# Patient Record
Sex: FEMALE | Race: WHITE | NOT HISPANIC OR LATINO | Employment: OTHER | URBAN - METROPOLITAN AREA
[De-identification: names, ages, dates, MRNs, and addresses within clinical notes are randomized per-mention and may not be internally consistent; named-entity substitution may affect disease eponyms.]

---

## 2017-04-11 ENCOUNTER — HOSPITAL ENCOUNTER (OUTPATIENT)
Dept: RADIOLOGY | Facility: MEDICAL CENTER | Age: 47
End: 2017-04-11
Attending: SURGERY
Payer: COMMERCIAL

## 2017-04-11 DIAGNOSIS — N63.0 LUMP OR MASS IN BREAST: ICD-10-CM

## 2017-04-11 DIAGNOSIS — N64.4 MASTODYNIA: ICD-10-CM

## 2017-04-11 PROCEDURE — 76642 ULTRASOUND BREAST LIMITED: CPT | Mod: LT

## 2017-04-11 PROCEDURE — G0204 DX MAMMO INCL CAD BI: HCPCS

## 2017-07-03 ENCOUNTER — HOSPITAL ENCOUNTER (OUTPATIENT)
Dept: RADIOLOGY | Facility: MEDICAL CENTER | Age: 47
End: 2017-07-03
Attending: ORTHOPAEDIC SURGERY
Payer: COMMERCIAL

## 2017-07-03 DIAGNOSIS — M25.512 LEFT SHOULDER PAIN, UNSPECIFIED CHRONICITY: ICD-10-CM

## 2017-07-03 PROCEDURE — 73222 MRI JOINT UPR EXTREM W/DYE: CPT | Mod: LT

## 2017-07-03 PROCEDURE — 700101 HCHG RX REV CODE 250

## 2017-07-03 PROCEDURE — 700117 HCHG RX CONTRAST REV CODE 255: Performed by: ORTHOPAEDIC SURGERY

## 2017-07-03 PROCEDURE — 23350 INJECTION FOR SHOULDER X-RAY: CPT | Mod: LT

## 2017-07-03 RX ORDER — LIDOCAINE HYDROCHLORIDE 10 MG/ML
INJECTION, SOLUTION INFILTRATION; PERINEURAL
Status: DISPENSED
Start: 2017-07-03 | End: 2017-07-03

## 2017-07-03 RX ADMIN — IOHEXOL 50 ML: 300 INJECTION, SOLUTION INTRAVENOUS at 09:29

## 2017-10-04 ENCOUNTER — APPOINTMENT (OUTPATIENT)
Dept: RADIOLOGY | Facility: MEDICAL CENTER | Age: 47
End: 2017-10-04
Attending: EMERGENCY MEDICINE
Payer: COMMERCIAL

## 2017-10-09 ENCOUNTER — HOSPITAL ENCOUNTER (OUTPATIENT)
Dept: RADIOLOGY | Facility: MEDICAL CENTER | Age: 47
End: 2017-10-09
Attending: EMERGENCY MEDICINE
Payer: COMMERCIAL

## 2017-10-09 DIAGNOSIS — J32.3 CHRONIC SPHENOIDAL SINUSITIS: ICD-10-CM

## 2017-10-09 PROCEDURE — 70486 CT MAXILLOFACIAL W/O DYE: CPT

## 2017-10-12 ENCOUNTER — HOSPITAL ENCOUNTER (OUTPATIENT)
Dept: RADIOLOGY | Facility: MEDICAL CENTER | Age: 47
End: 2017-10-12
Attending: EMERGENCY MEDICINE
Payer: COMMERCIAL

## 2017-10-12 DIAGNOSIS — R92.2 INCONCLUSIVE MAMMOGRAPHY DUE TO DENSE BREASTS: ICD-10-CM

## 2017-10-12 DIAGNOSIS — R92.30 INCONCLUSIVE MAMMOGRAPHY DUE TO DENSE BREASTS: ICD-10-CM

## 2017-10-12 PROCEDURE — 76642 ULTRASOUND BREAST LIMITED: CPT | Mod: LT

## 2017-11-09 ENCOUNTER — HOSPITAL ENCOUNTER (OUTPATIENT)
Facility: MEDICAL CENTER | Age: 47
End: 2017-11-09
Attending: OTOLARYNGOLOGY | Admitting: OTOLARYNGOLOGY
Payer: COMMERCIAL

## 2018-05-18 ENCOUNTER — HOSPITAL ENCOUNTER (OUTPATIENT)
Dept: RADIOLOGY | Facility: MEDICAL CENTER | Age: 48
End: 2018-05-18
Attending: EMERGENCY MEDICINE
Payer: COMMERCIAL

## 2018-05-18 DIAGNOSIS — R92.8 ABNORMAL FINDING ON BREAST IMAGING: ICD-10-CM

## 2018-05-18 PROCEDURE — 76642 ULTRASOUND BREAST LIMITED: CPT | Mod: LT

## 2018-05-18 PROCEDURE — G0279 TOMOSYNTHESIS, MAMMO: HCPCS

## 2018-05-31 ENCOUNTER — APPOINTMENT (OUTPATIENT)
Dept: RADIOLOGY | Facility: IMAGING CENTER | Age: 48
End: 2018-05-31
Attending: NURSE PRACTITIONER
Payer: COMMERCIAL

## 2018-05-31 ENCOUNTER — OFFICE VISIT (OUTPATIENT)
Dept: URGENT CARE | Facility: CLINIC | Age: 48
End: 2018-05-31
Payer: COMMERCIAL

## 2018-05-31 VITALS
SYSTOLIC BLOOD PRESSURE: 142 MMHG | WEIGHT: 145 LBS | TEMPERATURE: 98.4 F | HEART RATE: 82 BPM | BODY MASS INDEX: 21.98 KG/M2 | OXYGEN SATURATION: 98 % | DIASTOLIC BLOOD PRESSURE: 98 MMHG | RESPIRATION RATE: 16 BRPM | HEIGHT: 68 IN

## 2018-05-31 DIAGNOSIS — J45.20 MILD INTERMITTENT REACTIVE AIRWAY DISEASE WITHOUT COMPLICATION: Primary | ICD-10-CM

## 2018-05-31 DIAGNOSIS — R05.9 COUGH: ICD-10-CM

## 2018-05-31 PROCEDURE — 99214 OFFICE O/P EST MOD 30 MIN: CPT | Mod: 25 | Performed by: NURSE PRACTITIONER

## 2018-05-31 PROCEDURE — 94640 AIRWAY INHALATION TREATMENT: CPT | Performed by: NURSE PRACTITIONER

## 2018-05-31 PROCEDURE — 71046 X-RAY EXAM CHEST 2 VIEWS: CPT | Mod: TC,FY | Performed by: NURSE PRACTITIONER

## 2018-05-31 RX ORDER — ALBUTEROL SULFATE 2.5 MG/3ML
2.5 SOLUTION RESPIRATORY (INHALATION) ONCE
Status: COMPLETED | OUTPATIENT
Start: 2018-05-31 | End: 2018-05-31

## 2018-05-31 RX ORDER — BENZONATATE 200 MG/1
200 CAPSULE ORAL 3 TIMES DAILY PRN
Qty: 30 CAP | Refills: 0 | Status: SHIPPED | OUTPATIENT
Start: 2018-05-31 | End: 2019-10-22

## 2018-05-31 RX ADMIN — ALBUTEROL SULFATE 2.5 MG: 2.5 SOLUTION RESPIRATORY (INHALATION) at 18:58

## 2018-05-31 ASSESSMENT — ENCOUNTER SYMPTOMS
SHORTNESS OF BREATH: 0
HEMOPTYSIS: 0
HEARTBURN: 0
CHILLS: 0
DIZZINESS: 0
FEVER: 0
SORE THROAT: 1
BACK PAIN: 0
SPUTUM PRODUCTION: 1
HEADACHES: 0
VOMITING: 0
BLURRED VISION: 0
CONSTIPATION: 0
NECK PAIN: 0
FOCAL WEAKNESS: 0
PALPITATIONS: 0
COUGH: 1
NAUSEA: 0
ABDOMINAL PAIN: 0
MYALGIAS: 0
WEIGHT LOSS: 0
ORTHOPNEA: 0
DIARRHEA: 0
SWEATS: 1
WHEEZING: 0
TINGLING: 0
BRUISES/BLEEDS EASILY: 0
SENSORY CHANGE: 0
EYE PAIN: 0

## 2018-05-31 ASSESSMENT — LIFESTYLE VARIABLES: SUBSTANCE_ABUSE: 0

## 2018-05-31 ASSESSMENT — COPD QUESTIONNAIRES: COPD: 0

## 2018-05-31 NOTE — PROGRESS NOTES
Subjective:      Odalys Ballard is a 47 y.o. female who presents with Cough (x4wks cough, congestion in the chest)      Denies past medical, surgical or family history that is significant to today's problem.   RX or OTC medications reviewed with patient today.   Allergies   Allergen Reactions   • Nkda [No Known Drug Allergy]              Cough   This is a new problem. The current episode started 1 to 4 weeks ago. The problem has been waxing and waning. The cough is productive of purulent sputum (feels a heaviness in her chest on the left side when she coughs. ). Associated symptoms include a sore throat and sweats. Pertinent negatives include no chest pain, chills, ear pain, fever, headaches, heartburn, hemoptysis, myalgias, nasal congestion, postnasal drip, rash, shortness of breath, weight loss or wheezing. She has tried OTC cough suppressant and a beta-agonist inhaler for the symptoms. The treatment provided mild relief. Her past medical history is significant for asthma, bronchitis and pneumonia. There is no history of bronchiectasis, COPD, emphysema or environmental allergies.       Review of Systems   Constitutional: Negative for chills, fever and weight loss.   HENT: Positive for sore throat. Negative for ear pain and postnasal drip.    Eyes: Negative for blurred vision and pain.   Respiratory: Positive for cough and sputum production. Negative for hemoptysis, shortness of breath and wheezing.    Cardiovascular: Negative for chest pain, palpitations and orthopnea.   Gastrointestinal: Negative for abdominal pain, constipation, diarrhea, heartburn, nausea and vomiting.   Genitourinary: Negative for dysuria.   Musculoskeletal: Negative for back pain, myalgias and neck pain.   Skin: Negative for rash.   Neurological: Negative for dizziness, tingling, sensory change, focal weakness and headaches.   Endo/Heme/Allergies: Negative for environmental allergies. Does not bruise/bleed easily.   Psychiatric/Behavioral:  "Negative for substance abuse.          Objective:     /98   Pulse 82   Temp 36.9 °C (98.4 °F)   Resp 16   Ht 1.715 m (5' 7.5\")   Wt 65.8 kg (145 lb)   SpO2 98%   Breastfeeding? No   BMI 22.38 kg/m²      Physical Exam   Constitutional: She is oriented to person, place, and time. Vital signs are normal. She appears well-developed and well-nourished.  Non-toxic appearance. She does not have a sickly appearance. She does not appear ill. No distress.   HENT:   Head: Normocephalic.   Right Ear: Hearing, external ear and ear canal normal. Tympanic membrane is injected. Tympanic membrane is not erythematous. No middle ear effusion.   Left Ear: Hearing, external ear and ear canal normal. Tympanic membrane is injected. Tympanic membrane is not erythematous.  No middle ear effusion.   Nose: Rhinorrhea present. No mucosal edema. Right sinus exhibits no maxillary sinus tenderness and no frontal sinus tenderness. Left sinus exhibits no maxillary sinus tenderness and no frontal sinus tenderness.   Mouth/Throat: Uvula is midline, oropharynx is clear and moist and mucous membranes are normal. No oropharyngeal exudate, posterior oropharyngeal edema, posterior oropharyngeal erythema or tonsillar abscesses.   Eyes: Pupils are equal, round, and reactive to light.   Neck: Trachea normal, normal range of motion and full passive range of motion without pain. Neck supple.   Cardiovascular: Normal rate and regular rhythm.  PMI is not displaced.    Pulmonary/Chest: Effort normal. No respiratory distress. She has decreased breath sounds. She has no wheezes. She has no rhonchi. She has no rales.   Abdominal: Soft. Normal appearance. There is no tenderness.   Musculoskeletal: Normal range of motion.   Lymphadenopathy:     She has no cervical adenopathy.        Right: No supraclavicular adenopathy present.        Left: No supraclavicular adenopathy present.   Neurological: She is alert and oriented to person, place, and time. She " has normal strength. Gait normal.   Skin: Skin is warm and dry.   Psychiatric: She has a normal mood and affect. Her speech is normal and behavior is normal.   Nursing note and vitals reviewed.       Albuterol nebulized treatment given in urgent care. Tolerated well.      CXR:     5/31/2018 2:59 PM    HISTORY/REASON FOR EXAM:  Cough  Cough, congestion, shallow breathing x 1 week    TECHNIQUE/EXAM DESCRIPTION AND NUMBER OF VIEWS:  Two views of the chest.    COMPARISON:  4/29/16.    FINDINGS:    No pulmonary infiltrates or consolidations are noted.  No pleural effusions, no pneumothorax are appreciated.  Normal cardiopericardial silhouette.     Impression         1. No active cardiopulmonary abnormalities are identified.   Reading Provider Reading Date   Jamshid Hernandez M.D. May 31, 2018       Assessment/Plan:     1. Mild intermittent reactive airway disease without complication  albuterol (PROVENTIL) 2.5mg/3ml nebulizer solution 2.5 mg    beclomethasone (QVAR) 40 MCG/ACT inhaler   2. Cough  DX-CHEST-2 VIEWS    albuterol (PROVENTIL) 2.5mg/3ml nebulizer solution 2.5 mg    beclomethasone (QVAR) 40 MCG/ACT inhaler    benzonatate (TESSALON) 200 MG capsule     Educated in proper administration of medication(s) ordered today including safety, possible SE, risks, benefits, rationale and alternatives to therapy.   Return to clinic or PCP  4-5 days if current symptoms are not resolving in a satisfactory manner or sooner if new or worsening symptoms occur.   Patient  advised differential diagnoses, signs and symptoms which would warrant further evaluation and /or emergent evaluation.     Patient was in agreement with this treatment plan and seemed to understand without barriers.   Questions were encouraged and answered to patients satisfaction.   Pt education done. Aftercare instructions given to pt/ caregiver. .   Keep well hydrated.

## 2018-09-08 ENCOUNTER — HOSPITAL ENCOUNTER (OUTPATIENT)
Dept: RADIOLOGY | Facility: MEDICAL CENTER | Age: 48
End: 2018-09-08
Attending: OTOLARYNGOLOGY
Payer: COMMERCIAL

## 2018-09-08 DIAGNOSIS — R22.1 NECK MASS: ICD-10-CM

## 2018-09-08 PROCEDURE — 70491 CT SOFT TISSUE NECK W/DYE: CPT

## 2018-09-08 PROCEDURE — 700117 HCHG RX CONTRAST REV CODE 255: Performed by: OTOLARYNGOLOGY

## 2018-09-08 RX ADMIN — IOHEXOL 80 ML: 350 INJECTION, SOLUTION INTRAVENOUS at 15:14

## 2018-10-09 ENCOUNTER — HOSPITAL ENCOUNTER (OUTPATIENT)
Dept: RADIOLOGY | Facility: MEDICAL CENTER | Age: 48
End: 2018-10-09
Attending: EMERGENCY MEDICINE
Payer: COMMERCIAL

## 2018-10-09 DIAGNOSIS — R92.30 INCONCLUSIVE MAMMOGRAPHY DUE TO DENSE BREASTS: ICD-10-CM

## 2018-10-09 DIAGNOSIS — R92.2 INCONCLUSIVE MAMMOGRAPHY DUE TO DENSE BREASTS: ICD-10-CM

## 2018-10-09 PROCEDURE — 76642 ULTRASOUND BREAST LIMITED: CPT | Mod: LT

## 2019-01-23 ENCOUNTER — HOSPITAL ENCOUNTER (OUTPATIENT)
Dept: RADIOLOGY | Facility: MEDICAL CENTER | Age: 49
End: 2019-01-23
Attending: EMERGENCY MEDICINE
Payer: COMMERCIAL

## 2019-01-23 DIAGNOSIS — R51.9 NONINTRACTABLE HEADACHE, UNSPECIFIED CHRONICITY PATTERN, UNSPECIFIED HEADACHE TYPE: ICD-10-CM

## 2019-01-23 PROCEDURE — 70260 X-RAY EXAM OF SKULL: CPT

## 2019-01-23 PROCEDURE — 72040 X-RAY EXAM NECK SPINE 2-3 VW: CPT

## 2019-02-28 ENCOUNTER — HOSPITAL ENCOUNTER (OUTPATIENT)
Dept: RADIOLOGY | Facility: MEDICAL CENTER | Age: 49
End: 2019-02-28
Attending: OTOLARYNGOLOGY
Payer: COMMERCIAL

## 2019-02-28 DIAGNOSIS — R51.9 NONINTRACTABLE EPISODIC HEADACHE, UNSPECIFIED HEADACHE TYPE: ICD-10-CM

## 2019-02-28 PROCEDURE — 70553 MRI BRAIN STEM W/O & W/DYE: CPT

## 2019-02-28 PROCEDURE — 700117 HCHG RX CONTRAST REV CODE 255: Performed by: OTOLARYNGOLOGY

## 2019-02-28 PROCEDURE — A9585 GADOBUTROL INJECTION: HCPCS | Performed by: OTOLARYNGOLOGY

## 2019-02-28 RX ORDER — GADOBUTROL 604.72 MG/ML
6 INJECTION INTRAVENOUS ONCE
Status: COMPLETED | OUTPATIENT
Start: 2019-02-28 | End: 2019-02-28

## 2019-02-28 RX ADMIN — GADOBUTROL 6 ML: 604.72 INJECTION INTRAVENOUS at 16:37

## 2019-04-23 ENCOUNTER — APPOINTMENT (OUTPATIENT)
Dept: RADIOLOGY | Facility: MEDICAL CENTER | Age: 49
End: 2019-04-23
Attending: SURGERY
Payer: COMMERCIAL

## 2019-05-09 ENCOUNTER — HOSPITAL ENCOUNTER (OUTPATIENT)
Dept: RADIOLOGY | Facility: MEDICAL CENTER | Age: 49
End: 2019-05-09
Attending: EMERGENCY MEDICINE
Payer: COMMERCIAL

## 2019-05-30 ENCOUNTER — HOSPITAL ENCOUNTER (OUTPATIENT)
Dept: RADIOLOGY | Facility: MEDICAL CENTER | Age: 49
End: 2019-05-30
Attending: SURGERY
Payer: COMMERCIAL

## 2019-05-30 ENCOUNTER — APPOINTMENT (OUTPATIENT)
Dept: RADIOLOGY | Facility: MEDICAL CENTER | Age: 49
End: 2019-05-30
Attending: EMERGENCY MEDICINE
Payer: COMMERCIAL

## 2019-05-30 DIAGNOSIS — R92.8 ABNORMAL MAMMOGRAM: ICD-10-CM

## 2019-05-30 PROCEDURE — 700117 HCHG RX CONTRAST REV CODE 255: Performed by: SURGERY

## 2019-05-30 PROCEDURE — A9585 GADOBUTROL INJECTION: HCPCS | Performed by: SURGERY

## 2019-05-30 PROCEDURE — C8908 MRI W/O FOL W/CONT, BREAST,: HCPCS

## 2019-05-30 RX ORDER — GADOBUTROL 604.72 MG/ML
7.5 INJECTION INTRAVENOUS ONCE
Status: COMPLETED | OUTPATIENT
Start: 2019-05-30 | End: 2019-05-30

## 2019-05-30 RX ADMIN — GADOBUTROL 7.5 ML: 604.72 INJECTION INTRAVENOUS at 13:06

## 2019-07-12 ENCOUNTER — OFFICE VISIT (OUTPATIENT)
Dept: URGENT CARE | Facility: CLINIC | Age: 49
End: 2019-07-12
Payer: COMMERCIAL

## 2019-07-12 ENCOUNTER — HOSPITAL ENCOUNTER (OUTPATIENT)
Dept: RADIOLOGY | Facility: MEDICAL CENTER | Age: 49
End: 2019-07-12
Attending: PHYSICIAN ASSISTANT
Payer: COMMERCIAL

## 2019-07-12 VITALS
HEIGHT: 68 IN | RESPIRATION RATE: 20 BRPM | OXYGEN SATURATION: 94 % | BODY MASS INDEX: 23.04 KG/M2 | WEIGHT: 152 LBS | HEART RATE: 88 BPM | TEMPERATURE: 99.5 F | SYSTOLIC BLOOD PRESSURE: 130 MMHG | DIASTOLIC BLOOD PRESSURE: 90 MMHG

## 2019-07-12 DIAGNOSIS — R10.9 ABDOMINAL PAIN, UNSPECIFIED ABDOMINAL LOCATION: ICD-10-CM

## 2019-07-12 DIAGNOSIS — R10.32 LLQ ABDOMINAL PAIN: ICD-10-CM

## 2019-07-12 LAB
APPEARANCE UR: CLEAR
BILIRUB UR STRIP-MCNC: NORMAL MG/DL
COLOR UR AUTO: NORMAL
GLUCOSE UR STRIP.AUTO-MCNC: NORMAL MG/DL
KETONES UR STRIP.AUTO-MCNC: NORMAL MG/DL
LEUKOCYTE ESTERASE UR QL STRIP.AUTO: NORMAL
NITRITE UR QL STRIP.AUTO: NORMAL
PH UR STRIP.AUTO: 5 [PH] (ref 5–8)
PROT UR QL STRIP: NORMAL MG/DL
RBC UR QL AUTO: NORMAL
SP GR UR STRIP.AUTO: 1
UROBILINOGEN UR STRIP-MCNC: 0.2 MG/DL

## 2019-07-12 PROCEDURE — 74177 CT ABD & PELVIS W/CONTRAST: CPT

## 2019-07-12 PROCEDURE — 700117 HCHG RX CONTRAST REV CODE 255: Performed by: PHYSICIAN ASSISTANT

## 2019-07-12 PROCEDURE — 99214 OFFICE O/P EST MOD 30 MIN: CPT | Performed by: PHYSICIAN ASSISTANT

## 2019-07-12 PROCEDURE — 81002 URINALYSIS NONAUTO W/O SCOPE: CPT | Performed by: PHYSICIAN ASSISTANT

## 2019-07-12 RX ADMIN — IOHEXOL 100 ML: 350 INJECTION, SOLUTION INTRAVENOUS at 17:32

## 2019-07-12 ASSESSMENT — ENCOUNTER SYMPTOMS
COUGH: 0
DIARRHEA: 1
ABDOMINAL PAIN: 1
SHORTNESS OF BREATH: 0
BLOOD IN STOOL: 1
FEVER: 1
PALPITATIONS: 0
CHILLS: 0
VOMITING: 0
HEADACHES: 0
CONSTIPATION: 1
ANOREXIA: 1
FLATUS: 0
MYALGIAS: 0
NAUSEA: 0

## 2019-07-12 ASSESSMENT — PATIENT HEALTH QUESTIONNAIRE - PHQ9: CLINICAL INTERPRETATION OF PHQ2 SCORE: 0

## 2019-07-12 NOTE — PROGRESS NOTES
"Subjective:      Odalys Ballard is a 48 y.o. female who presents with Abdominal Pain (patient thinks it's diverticulosis, bloaded, and pressure on the lower left side, had bad constipation,  cramping, stool has a \"fishy\" smell x2 weeks)            Abdominal Pain   This is a new problem. Episode onset: 2 weeks  The problem occurs constantly. The pain is located in the LLQ. The pain is moderate. The quality of the pain is aching, cramping and a sensation of fullness. Pain radiation: lower back  Associated symptoms include anorexia, constipation, diarrhea (fishy loose stools) and a fever (low grade- subjective ). Pertinent negatives include no dysuria, flatus, frequency, headaches, hematuria, melena, myalgias, nausea or vomiting. Associated symptoms comments: Abdominal distension. Exacerbated by: eating and drinking  The pain is relieved by nothing. Treatments tried: liquid diet  The treatment provided no relief. Her past medical history is significant for abdominal surgery (Cholecystectomy). Hysterectomy. Patient still has ovaries.     Past Medical History:   Diagnosis Date   • ASTHMA     prn inhaler   • Bronchitis 2015   • Heart burn    • Hiatus hernia syndrome    • Other specified symptom associated with female genital organs     endometriosis, fibroids   • Pneumonia 2010       Past Surgical History:   Procedure Laterality Date   • HYSTERECTOMY ROBOTIC N/A 10/17/2016    Procedure: HYSTERECTOMY ROBOTIC, BILATERAL SALPINGECTOMY;  Surgeon: Yamilet Trammell M.D.;  Location: [facility];  Service:    • CYSTOSCOPY N/A 10/17/2016    Procedure: CYSTOSCOPY;  Surgeon: Yamilet Trammell M.D.;  Location: [facility];  Service:    • PELVISCOPY  9/6/2013    Performed by Spencer Matthews M.D. at [facility]   • CHOLECYSTECTOMY  2009    laparoscopic   • LAPAROSCOPY  2000, 2008       Family History   Problem Relation Age of Onset   • Diabetes Unknown    • Heart Disease Unknown    • " "Hypertension Unknown    • Cancer Unknown        Allergies   Allergen Reactions   • Nkda [No Known Drug Allergy]        Medications, Allergies, and current problem list reviewed today in Epic      Review of Systems   Constitutional: Positive for fever (low grade- subjective ). Negative for chills and malaise/fatigue.   Respiratory: Negative for cough and shortness of breath.    Cardiovascular: Negative for chest pain, palpitations and leg swelling.   Gastrointestinal: Positive for abdominal pain (LLQ), anorexia, blood in stool (one episode of bright red blood after bowel movement- resolved.), constipation and diarrhea (fishy loose stools). Negative for flatus, melena, nausea and vomiting.   Genitourinary: Negative for dysuria, frequency and hematuria.   Musculoskeletal: Negative for myalgias.   Neurological: Negative for headaches.     All other systems reviewed and are negative.        Objective:     /90 (BP Location: Right arm, Patient Position: Sitting, BP Cuff Size: Adult)   Pulse 88   Temp 37.5 °C (99.5 °F) (Temporal)   Resp 20   Ht 1.715 m (5' 7.5\")   Wt 68.9 kg (152 lb)   SpO2 94%   BMI 23.46 kg/m²      Physical Exam   Constitutional: She is oriented to person, place, and time. She appears well-developed and well-nourished. No distress.   HENT:   Head: Normocephalic and atraumatic.   Eyes: Conjunctivae are normal.   Cardiovascular: Normal rate, regular rhythm and normal heart sounds.  Exam reveals no gallop and no friction rub.    No murmur heard.  Pulmonary/Chest: Effort normal and breath sounds normal. No respiratory distress. She has no wheezes. She has no rales.   Abdominal: Normal appearance and bowel sounds are normal. She exhibits distension (mild abdominal distenstion). There is no hepatosplenomegaly. There is tenderness in the left lower quadrant. There is no rigidity, no rebound, no guarding, no CVA tenderness, no tenderness at McBurney's point and negative Candelaria's sign. No hernia. "   Neurological: She is alert and oriented to person, place, and time. No cranial nerve deficit.   Skin: Skin is warm and dry. No rash noted.   Psychiatric: She has a normal mood and affect. Her behavior is normal. Judgment and thought content normal.           Lab Results   Component Value Date/Time    POCCOLOR LIGHT YELLOW 07/12/2019 09:31 AM    POCAPPEAR CLEAR 07/12/2019 09:31 AM    POCLEUKEST NEG 07/12/2019 09:31 AM    POCNITRITE NEG 07/12/2019 09:31 AM    POCUROBILIGE 0.2 07/12/2019 09:31 AM    POCPROTEIN NEG 07/12/2019 09:31 AM    POCURPH 5.0 07/12/2019 09:31 AM    POCBLOOD TRACE-INTACT 07/12/2019 09:31 AM    POCSPGRV 1.005 07/12/2019 09:31 AM    POCKETONES NEG 07/12/2019 09:31 AM    POCBILIRUBIN NEG 07/12/2019 09:31 AM    POCGLUCUA NEG 07/12/2019 09:31 AM      7/12/2019 4:56 PM    HISTORY/REASON FOR EXAM:  LLQ abdominal pain, diverticulitis suspected  Left lower quadrant abdominal pain.    TECHNIQUE/EXAM DESCRIPTION:   CT scan of the abdomen and pelvis with contrast.    Contrast-enhanced helical scanning was obtained from the diaphragmatic domes through the pubic symphysis following the bolus administration of nonionic contrast without complication.    100 mL of Omnipaque 350 nonionic contrast was administered without complication.    Low dose optimization technique was utilized for this CT exam including automated exposure control and adjustment of the mA and/or kV according to patient size.    COMPARISON: 9/23/2016.    FINDINGS:    Lung bases:    No pulmonary nodules at the lung bases. No pleural or pericardial fluid.    Abdomen:    The liver is unremarkable.    The spleen is unremarkable.    The pancreas is unremarkable.    The gallbladder is surgically absent.    The adrenal glands are normal in size.    The kidneys enhance symmetrically.    The abdominal aorta is normal in caliber.    There is no lymphadenopathy.    No bowel wall thickening or bowel dilatation.    Pelvis:    The uterus is surgically  absent.    No lymph node enlargement, free fluid, or free air in the abdomen or pelvis.    No aggressive bone lesions are seen.    Mild/moderate degenerative change of the lumbar spine.  ___________________________________   Impression         1. No acute abnormality identified in the abdomen or pelvis.   Reading Provider Reading Date   Jamshid Hernandez M.D. Jul 12, 2019            Assessment/Plan:     1. LLQ abdominal pain  POCT Urinalysis    CT-ABDOMEN-PELVIS WITH    CULTURE STOOL    CRYPTO/GIARDIA RAPID ASSAY    C Diff by PCR rflx Toxin    CANCELED: C Diff by PCR rflx Toxin   2. Abdominal pain, unspecified abdominal location  CT-ABDOMEN-PELVIS WITH    C Diff by PCR rflx Toxin       CT of abdomen and pelvis negative for acute abnormalities.  Discussed with patient  Via telephone.   Unclear etiology for abdominal pain.  Will check Stool studies and change treatment plan accordingly.  Patient has follow-up with GI in several weeks.     Differential diagnoses, Supportive care, and indications for immediate follow-up discussed with patient.   Instructed to return to clinic or nearest emergency department for any change in condition, further concerns, or worsening of symptoms.    The patient demonstrated a good understanding and agreed with the treatment plan.    Cari Mcbride P.A.-C.

## 2019-07-17 ENCOUNTER — HOSPITAL ENCOUNTER (OUTPATIENT)
Facility: MEDICAL CENTER | Age: 49
End: 2019-07-17
Attending: PHYSICIAN ASSISTANT
Payer: COMMERCIAL

## 2019-07-17 DIAGNOSIS — R10.32 LLQ ABDOMINAL PAIN: ICD-10-CM

## 2019-07-17 DIAGNOSIS — R10.9 ABDOMINAL PAIN, UNSPECIFIED ABDOMINAL LOCATION: ICD-10-CM

## 2019-07-17 LAB
C DIFF DNA SPEC QL NAA+PROBE: NEGATIVE
C DIFF TOX GENS STL QL NAA+PROBE: NEGATIVE
G LAMBLIA+C PARVUM AG STL QL RAPID: NORMAL
SIGNIFICANT IND 70042: NORMAL
SITE SITE: NORMAL
SOURCE SOURCE: NORMAL

## 2019-07-17 PROCEDURE — 87493 C DIFF AMPLIFIED PROBE: CPT

## 2019-07-17 PROCEDURE — 87045 FECES CULTURE AEROBIC BACT: CPT

## 2019-07-17 PROCEDURE — 87046 STOOL CULTR AEROBIC BACT EA: CPT

## 2019-07-17 PROCEDURE — 87329 GIARDIA AG IA: CPT

## 2019-07-17 PROCEDURE — 87328 CRYPTOSPORIDIUM AG IA: CPT

## 2019-07-17 PROCEDURE — 87899 AGENT NOS ASSAY W/OPTIC: CPT

## 2019-07-18 LAB
E COLI SXT1+2 STL IA: NORMAL
SIGNIFICANT IND 70042: NORMAL
SITE SITE: NORMAL
SOURCE SOURCE: NORMAL

## 2019-07-20 LAB
BACTERIA STL CULT: NORMAL
E COLI SXT1+2 STL IA: NORMAL
SIGNIFICANT IND 70042: NORMAL
SITE SITE: NORMAL
SOURCE SOURCE: NORMAL

## 2019-09-06 ENCOUNTER — HOSPITAL ENCOUNTER (OUTPATIENT)
Dept: RADIOLOGY | Facility: MEDICAL CENTER | Age: 49
End: 2019-09-06
Attending: EMERGENCY MEDICINE
Payer: COMMERCIAL

## 2019-09-06 DIAGNOSIS — D37.032 NEOPLASM OF UNCERTAIN BEHAVIOR OF SUBMANDIBULAR GLAND: ICD-10-CM

## 2019-09-06 PROCEDURE — 76536 US EXAM OF HEAD AND NECK: CPT

## 2019-10-22 ENCOUNTER — OFFICE VISIT (OUTPATIENT)
Dept: URGENT CARE | Facility: CLINIC | Age: 49
End: 2019-10-22
Payer: COMMERCIAL

## 2019-10-22 VITALS
WEIGHT: 150 LBS | OXYGEN SATURATION: 95 % | HEART RATE: 84 BPM | BODY MASS INDEX: 23.15 KG/M2 | SYSTOLIC BLOOD PRESSURE: 160 MMHG | TEMPERATURE: 98.2 F | DIASTOLIC BLOOD PRESSURE: 92 MMHG | RESPIRATION RATE: 20 BRPM

## 2019-10-22 DIAGNOSIS — J22 LOWER RESPIRATORY INFECTION: ICD-10-CM

## 2019-10-22 PROCEDURE — 99214 OFFICE O/P EST MOD 30 MIN: CPT | Performed by: NURSE PRACTITIONER

## 2019-10-22 RX ORDER — AZITHROMYCIN 250 MG/1
TABLET, FILM COATED ORAL
Qty: 6 TAB | Refills: 0 | Status: SHIPPED | OUTPATIENT
Start: 2019-10-22 | End: 2020-06-15

## 2019-10-22 ASSESSMENT — ENCOUNTER SYMPTOMS
ABDOMINAL PAIN: 0
DOUBLE VISION: 0
WHEEZING: 1
COUGH: 1
SPUTUM PRODUCTION: 1
DIZZINESS: 0
FEVER: 0
CONSTIPATION: 0
BLURRED VISION: 0
SORE THROAT: 0
PALPITATIONS: 0
MUSCULOSKELETAL NEGATIVE: 1
STRIDOR: 0
NAUSEA: 0
DIARRHEA: 0
VOMITING: 0
HEADACHES: 0
CHILLS: 0

## 2019-10-22 NOTE — PROGRESS NOTES
Subjective:   Odalys Ballard is a 49 y.o. female who presents for Cough (Almost a week dry cough , chest congestion)        Cough   This is a new problem. The current episode started 1 to 4 weeks ago. The problem has been waxing and waning. The problem occurs every few minutes. The cough is productive of sputum. Associated symptoms include nasal congestion, postnasal drip and wheezing. Pertinent negatives include no chest pain, chills, ear congestion, ear pain, fever, headaches, rash or sore throat. The symptoms are aggravated by lying down. She has tried OTC cough suppressant for the symptoms. The treatment provided mild relief. Her past medical history is significant for asthma.        Review of Systems   Constitutional: Negative for chills and fever.   HENT: Positive for congestion and postnasal drip. Negative for ear discharge, ear pain and sore throat.    Eyes: Negative for blurred vision and double vision.   Respiratory: Positive for cough, sputum production and wheezing. Negative for stridor.    Cardiovascular: Negative for chest pain and palpitations.   Gastrointestinal: Negative for abdominal pain, constipation, diarrhea, nausea and vomiting.   Musculoskeletal: Negative.    Skin: Negative.  Negative for itching and rash.   Neurological: Negative for dizziness and headaches.   All other systems reviewed and are negative.    Patient's PMH, SocHx, SurgHx, FamHx, Drug allergies and medications reviewed.     Objective:   /92   Pulse 84   Temp 36.8 °C (98.2 °F) (Temporal)   Resp 20   Wt 68 kg (150 lb)   SpO2 95%   BMI 23.15 kg/m²   Physical Exam   Constitutional: She is oriented to person, place, and time. She appears well-developed and well-nourished. No distress.   HENT:   Head: Normocephalic.   Right Ear: Hearing and ear canal normal. Tympanic membrane is not erythematous. A middle ear effusion is present.   Left Ear: Hearing, tympanic membrane and ear canal normal. Tympanic membrane is not  erythematous.  No middle ear effusion.   Nose: Mucosal edema present. No rhinorrhea. Right sinus exhibits no maxillary sinus tenderness and no frontal sinus tenderness. Left sinus exhibits no maxillary sinus tenderness and no frontal sinus tenderness.   Mouth/Throat: Uvula is midline and mucous membranes are normal. Oropharyngeal exudate (PND) present. No posterior oropharyngeal erythema.   Eyes: Pupils are equal, round, and reactive to light. Conjunctivae, EOM and lids are normal.   Neck: Normal range of motion. No thyromegaly present.   Cardiovascular: Normal rate, regular rhythm and normal heart sounds.   Pulmonary/Chest: Effort normal. No accessory muscle usage or stridor. No apnea, no tachypnea and no bradypnea. No respiratory distress. She has no decreased breath sounds. She has wheezes in the left upper field.   Lymphadenopathy:        Head (right side): No submandibular and no tonsillar adenopathy present.        Head (left side): No submandibular and no tonsillar adenopathy present.   Neurological: She is alert and oriented to person, place, and time.   Skin: Skin is warm and dry. No rash noted. She is not diaphoretic.   Psychiatric: She has a normal mood and affect. Her speech is normal and behavior is normal. Judgment and thought content normal.   Vitals reviewed.        Assessment/Plan:   Assessment    1. Lower respiratory infection  - azithromycin (ZITHROMAX) 250 MG Tab; Take two tabs po day one followed by one tab po day two through five with food  Dispense: 6 Tab; Refill: 0    Patient encouraged to increase clear liquid intake    Differential diagnosis, natural history, supportive care, and indications for immediate follow-up discussed.     **Please note that all invasive procedures during this visit were performed by myself and/or the Medical Assistant under the supervision of the PA or MD in office**

## 2020-02-05 ENCOUNTER — OFFICE VISIT (OUTPATIENT)
Dept: URGENT CARE | Facility: CLINIC | Age: 50
End: 2020-02-05
Payer: COMMERCIAL

## 2020-02-05 ENCOUNTER — APPOINTMENT (OUTPATIENT)
Dept: RADIOLOGY | Facility: IMAGING CENTER | Age: 50
End: 2020-02-05
Attending: PHYSICIAN ASSISTANT
Payer: COMMERCIAL

## 2020-02-05 VITALS
HEIGHT: 68 IN | RESPIRATION RATE: 12 BRPM | OXYGEN SATURATION: 99 % | DIASTOLIC BLOOD PRESSURE: 94 MMHG | WEIGHT: 140 LBS | BODY MASS INDEX: 21.22 KG/M2 | TEMPERATURE: 98.6 F | HEART RATE: 96 BPM | SYSTOLIC BLOOD PRESSURE: 152 MMHG

## 2020-02-05 DIAGNOSIS — R05.9 COUGH: ICD-10-CM

## 2020-02-05 DIAGNOSIS — R07.89 CHRONIC CHEST WALL PAIN: ICD-10-CM

## 2020-02-05 DIAGNOSIS — G89.29 CHRONIC CHEST WALL PAIN: ICD-10-CM

## 2020-02-05 PROCEDURE — 71046 X-RAY EXAM CHEST 2 VIEWS: CPT | Mod: TC | Performed by: PHYSICIAN ASSISTANT

## 2020-02-05 PROCEDURE — 99214 OFFICE O/P EST MOD 30 MIN: CPT | Performed by: PHYSICIAN ASSISTANT

## 2020-02-05 RX ORDER — KETOROLAC TROMETHAMINE 30 MG/ML
30 INJECTION, SOLUTION INTRAMUSCULAR; INTRAVENOUS ONCE
Status: COMPLETED | OUTPATIENT
Start: 2020-02-05 | End: 2020-02-05

## 2020-02-05 RX ADMIN — KETOROLAC TROMETHAMINE 30 MG: 30 INJECTION, SOLUTION INTRAMUSCULAR; INTRAVENOUS at 14:10

## 2020-02-05 NOTE — PROGRESS NOTES
"Subjective:   Odalys Ballard is a 49 y.o. female who presents for Cough (productive, since Oct) and Congestion (\"rattily\" since respiratory infection in October )        Patient presents with concerns of upper left chest discomfort and intermittent cough since October.  She states that she was initially evaluated in urgent care and treated with a azithromycin.  This significantly improved her symptoms, but cough persisted.  Cough is often dry but sometimes she does produce white or green mucus.  She states that she sometimes feels a \"rattly\" sensation.  Pain is not worsened by activity, but is worse with direct pressure.  She states that she does workout a lot and has had problems with her left shoulder in the past.  Denies leg pain, leg swelling, history of blood clots, shortness of breath, dyspnea o leg pain or swelling.  Does not take OCP or use exogenous estrogen.  No recent surgery or history of malignancy.  Patient has not tried any medications for symptoms tied from initial antibiotic.  No other aggravating or alleviating factors.      Review of Systems   Constitutional: Negative for chills, fever and malaise/fatigue.   Respiratory: Positive for cough and sputum production. Negative for hemoptysis, shortness of breath and wheezing.    Cardiovascular: Positive for chest pain. Negative for palpitations, orthopnea, claudication, leg swelling and PND.   Gastrointestinal: Negative for abdominal pain, nausea and vomiting.   Neurological: Negative for dizziness and headaches.       PMH:  has a past medical history of ASTHMA, Bronchitis (2015), Heart burn, Hiatus hernia syndrome, Other specified symptom associated with female genital organs, and Pneumonia (2010). She also has no past medical history of Allergy.  MEDS:   Current Outpatient Medications:   •  albuterol 108 (90 BASE) MCG/ACT Aero Soln inhalation aerosol, Inhale 2 Puffs by mouth every 6 hours as needed for Shortness of Breath., Disp: , Rfl:   •  " "acetaminophen (TYLENOL) 500 MG Tab, Take 1,000 mg by mouth 2 times a day as needed., Disp: , Rfl:   •  Ascorbic Acid (VITAMIN C PO), Take 1 Tab by mouth every day., Disp: , Rfl:   •  Cyanocobalamin (VITAMIN B-12 PO), Take 2 Tabs by mouth every day., Disp: , Rfl:   •  Eszopiclone (LUNESTA) 3 MG TABS, Take 3 mg by mouth every evening., Disp: , Rfl:   •  rizatriptan (MAXALT) 10 MG tablet, Take 10 mg by mouth Once PRN. May repeat in 2 hours if needed, Disp: , Rfl:   •  azithromycin (ZITHROMAX) 250 MG Tab, Take two tabs po day one followed by one tab po day two through five with food, Disp: 6 Tab, Rfl: 0  •  VITAMIN E PO, Take 1 Tab by mouth every day., Disp: , Rfl:   •  Levocetirizine Dihydrochloride (XYZAL) 5 MG TABS, Take 1 Tab by mouth 1 time daily as needed., Disp: , Rfl:   ALLERGIES:   Allergies   Allergen Reactions   • Nkda [No Known Drug Allergy]      SURGHX:   Past Surgical History:   Procedure Laterality Date   • HYSTERECTOMY ROBOTIC N/A 10/17/2016    Procedure: HYSTERECTOMY ROBOTIC, BILATERAL SALPINGECTOMY;  Surgeon: Yamilet Trammell M.D.;  Location: [facility];  Service:    • CYSTOSCOPY N/A 10/17/2016    Procedure: CYSTOSCOPY;  Surgeon: Yamilet Trammell M.D.;  Location: [facility];  Service:    • PELVISCOPY  9/6/2013    Per[facility] by Spencer [facility] M.D. [facility]   • CHOLECYSTECTOMY  2009    laparoscopic   • LAPAROSCOPY  2000, 2008     SOCHX:  reports that she has never smoked. She has never used smokeless tobacco. She reports current alcohol use. She reports that she does not use drugs.  FH: Family history was reviewed, no pertinent findings to report   Objective:   /94 (BP Location: Left arm, Patient Position: Sitting, BP Cuff Size: Adult)   Pulse 96   Temp 37 °C (98.6 °F) (Temporal)   Resp 12   Ht 1.727 m (5' 8\")   Wt 63.5 kg (140 lb)   LMP  (Exact Date)   SpO2 99%   Breastfeeding? No   BMI 21.29 kg/m²   Physical Exam  Vitals signs " reviewed.   Constitutional:       General: She is not in acute distress.     Appearance: Normal appearance. She is well-developed. She is not toxic-appearing.   HENT:      Head: Normocephalic and atraumatic.      Right Ear: External ear normal.      Left Ear: External ear normal.      Nose: Nose normal.   Eyes:      General: Lids are normal.      Conjunctiva/sclera: Conjunctivae normal.   Neck:      Musculoskeletal: Neck supple.   Cardiovascular:      Rate and Rhythm: Normal rate and regular rhythm.      Heart sounds: Normal heart sounds, S1 normal and S2 normal. No murmur. No friction rub. No gallop.    Pulmonary:      Effort: Pulmonary effort is normal. No respiratory distress.      Breath sounds: Normal breath sounds. No decreased breath sounds, wheezing, rhonchi or rales.   Musculoskeletal:      Right lower leg: No edema.      Left lower leg: No edema.      Comments: Left latissimus dorsi and teres major/teres minor to palpation with palpable knot.  Patient states that palpation of this area reproduces her discomfort.  Shoulder range of motion within normal limits.   Skin:     General: Skin is warm and dry.      Capillary Refill: Capillary refill takes less than 2 seconds.   Neurological:      Mental Status: She is alert and oriented to person, place, and time.      Cranial Nerves: No cranial nerve deficit.      Sensory: No sensory deficit.   Psychiatric:         Speech: Speech normal.         Behavior: Behavior normal.         Thought Content: Thought content normal.         Judgment: Judgment normal.           Assessment/Plan:   1. Chronic chest wall pain  - ketorolac (TORADOL) injection 30 mg    2. Cough  - DX-CHEST-2 VIEWS; Future    Course: 30 mg of toradol administered in clinic- excellent response. Pt reports 50% improvement in symptoms just 15 minutes after administration.  She feels the pain is continuing to improve.     Well's score -2, PERC criteria satisfied. Pre test probability low and clinical  suspicion is low. However this consideration was discussed with patient.    PE suggests more of a musculoskelatal cause of symptoms. However givenreported incomplete response to abx and occasional productive cough I do recommend a CXR to evaluate for pneumonia or other pulmonary abnormality.   COMPARISON:  1 view chest 5/31/2018     FINDINGS:  LUNGS: The lungs are clear.     HEART and MEDIASTINUM: normal in size.     Pleura: There are no pleural effusion or pneumothoraces.     Osseous structures: There is mild scoliosis.        IMPRESSION:     No acute cardiopulmonary abnormality identified.    X-ray results reviewed with patient.  Lungs are clear to auscultation-no evidence of lower respiratory infection or other pulmonary abnormality on physical exam.  Based on physical exam I suspect musculoskeletal cause of symptoms.  Pain is reproducible and patient had excellent response to Toradol.  Although clinical suspicion is low, I do recommend an EKG to better evaluate for possible cardiac causes.  Patient refuses-states that she does not have time and she strongly feels that symptoms are not cardiac in origin.  Patient advised that I would like her to follow-up with her PCP on Monday for reevaluation.  In the meantime I recommend NSAIDs and heat.    Red flag signs and symptoms discussed with patient-should she develop any of these she must go to the emergency room for reevaluation.  Patient verbalized good understanding.  Differential diagnosis, natural history, supportive care, and indications for immediate follow-up discussed.

## 2020-02-09 ASSESSMENT — ENCOUNTER SYMPTOMS
ABDOMINAL PAIN: 0
FEVER: 0
COUGH: 1
ORTHOPNEA: 0
HEADACHES: 0
HEMOPTYSIS: 0
DIZZINESS: 0
PND: 0
PALPITATIONS: 0
VOMITING: 0
CLAUDICATION: 0
CHILLS: 0
WHEEZING: 0
NAUSEA: 0
SHORTNESS OF BREATH: 0
SPUTUM PRODUCTION: 1

## 2020-03-20 ENCOUNTER — APPOINTMENT (OUTPATIENT)
Dept: RADIOLOGY | Facility: MEDICAL CENTER | Age: 50
End: 2020-03-20
Attending: SURGERY
Payer: COMMERCIAL

## 2020-06-01 ENCOUNTER — HOSPITAL ENCOUNTER (OUTPATIENT)
Dept: RADIOLOGY | Facility: MEDICAL CENTER | Age: 50
End: 2020-06-01
Attending: SURGERY
Payer: COMMERCIAL

## 2020-06-01 DIAGNOSIS — Z80.3 FAMILY HISTORY OF BREAST CANCER: ICD-10-CM

## 2020-06-01 PROCEDURE — 700117 HCHG RX CONTRAST REV CODE 255: Performed by: SURGERY

## 2020-06-01 PROCEDURE — A9576 INJ PROHANCE MULTIPACK: HCPCS | Performed by: SURGERY

## 2020-06-01 PROCEDURE — C8908 MRI W/O FOL W/CONT, BREAST,: HCPCS

## 2020-06-01 RX ADMIN — GADOTERIDOL 15 ML: 279.3 INJECTION, SOLUTION INTRAVENOUS at 11:00

## 2020-06-01 NOTE — DISCHARGE INSTRUCTIONS
MRI ADULT DISCHARGE INSTRUCTIONS    You have been medicated today for your scan. Please follow the instructions below to ensure your safe recovery. If you have any questions or problems, feel free to call us at [phone] or [phone].     1.   Have someone stay with you to assist you as needed.    2.   Do not drive or operate any mechanical devices.    3.   Do not perform any activity that requires concentration. Make no major decisions over the next 24 hours.     4.   Be careful changing positions from laying down to sitting or standing, as you may become dizzy.     5.   Do not drink alcohol for 48 hours.    6.   There are no restrictions for eating your normal meals. Drink fluids.    7.   You may continue your usual medications for pain, tranquilizers, muscle relaxants or sedatives when awake.     8.   Tomorrow, you may continue your normal daily activities.     9.   Pressure dressing on 10 - 15 minutes. If swelling or bleeding occurs when removed, continue placing direct pressure on injection site for another 5 minutes, or until bleeding stops.     I have been informed of and understand the above discharge instructions. MRI ADULT DISCHARGE INSTRUCTIONS    You have been medicated today for your scan. Please follow the instructions below to ensure your safe recovery. If you have any questions or problems, feel free to call us at [phone] or [phone].     1.   Have someone stay with you to assist you as needed.    2.   Do not drive or operate any mechanical devices.    3.   Do not perform any activity that requires concentration. Make no major decisions over the next 24 hours.     4.   Be careful changing positions from laying down to sitting or standing, as you may become dizzy.     5.   Do not drink alcohol for 48 hours.    6.   There are no restrictions for eating your normal meals. Drink fluids.    7.   You may continue your usual medications for pain, tranquilizers, muscle relaxants or sedatives when awake.      8.   Tomorrow, you may continue your normal daily activities.     9.   Pressure dressing on 10 - 15 minutes. If swelling or bleeding occurs when removed, continue placing direct pressure on injection site for another 5 minutes, or until bleeding stops.     I have been informed of and understand the above discharge instructions.

## 2020-06-15 ENCOUNTER — OFFICE VISIT (OUTPATIENT)
Dept: MEDICAL GROUP | Age: 50
End: 2020-06-15
Payer: COMMERCIAL

## 2020-06-15 VITALS
BODY MASS INDEX: 23.4 KG/M2 | HEIGHT: 68 IN | HEART RATE: 116 BPM | DIASTOLIC BLOOD PRESSURE: 98 MMHG | SYSTOLIC BLOOD PRESSURE: 154 MMHG | WEIGHT: 154.4 LBS | TEMPERATURE: 99.6 F | OXYGEN SATURATION: 97 %

## 2020-06-15 DIAGNOSIS — Z00.00 HEALTHCARE MAINTENANCE: ICD-10-CM

## 2020-06-15 DIAGNOSIS — I10 ESSENTIAL HYPERTENSION: ICD-10-CM

## 2020-06-15 DIAGNOSIS — M62.830 MUSCLE SPASM OF BACK: ICD-10-CM

## 2020-06-15 DIAGNOSIS — R07.1 CHEST PAIN ON BREATHING: ICD-10-CM

## 2020-06-15 DIAGNOSIS — G43.001 MIGRAINE WITHOUT AURA AND WITH STATUS MIGRAINOSUS, NOT INTRACTABLE: ICD-10-CM

## 2020-06-15 DIAGNOSIS — Z84.81 FAMILY HISTORY OF BREAST CANCER GENE MUTATION IN FIRST DEGREE RELATIVE: ICD-10-CM

## 2020-06-15 DIAGNOSIS — F51.01 PRIMARY INSOMNIA: ICD-10-CM

## 2020-06-15 DIAGNOSIS — J45.30 MILD PERSISTENT ASTHMA WITHOUT COMPLICATION: ICD-10-CM

## 2020-06-15 DIAGNOSIS — G43.009 MIGRAINE WITHOUT AURA AND WITHOUT STATUS MIGRAINOSUS, NOT INTRACTABLE: ICD-10-CM

## 2020-06-15 DIAGNOSIS — E55.9 VITAMIN D DEFICIENCY: ICD-10-CM

## 2020-06-15 PROCEDURE — 99204 OFFICE O/P NEW MOD 45 MIN: CPT | Performed by: INTERNAL MEDICINE

## 2020-06-15 PROCEDURE — 93000 ELECTROCARDIOGRAM COMPLETE: CPT | Performed by: INTERNAL MEDICINE

## 2020-06-15 RX ORDER — BACLOFEN 10 MG/1
10 TABLET ORAL
COMMUNITY
Start: 2020-05-12 | End: 2020-06-15 | Stop reason: SDUPTHER

## 2020-06-15 RX ORDER — LISINOPRIL 10 MG/1
10 TABLET ORAL DAILY
Qty: 90 TAB | Refills: 1 | Status: SHIPPED | OUTPATIENT
Start: 2020-06-15 | End: 2020-07-30

## 2020-06-15 RX ORDER — ESZOPICLONE 3 MG/1
3 TABLET, FILM COATED ORAL EVERY EVENING
Qty: 90 TAB | Refills: 1
Start: 2020-06-15 | End: 2020-08-04 | Stop reason: SDUPTHER

## 2020-06-15 RX ORDER — ALBUTEROL SULFATE 90 UG/1
2 AEROSOL, METERED RESPIRATORY (INHALATION) EVERY 6 HOURS PRN
Qty: 8.5 G | Refills: 11 | Status: SHIPPED | OUTPATIENT
Start: 2020-06-15 | End: 2023-10-13 | Stop reason: SDUPTHER

## 2020-06-15 RX ORDER — BACLOFEN 10 MG/1
10 TABLET ORAL
Qty: 90 TAB | Refills: 1
Start: 2020-06-15 | End: 2020-08-03 | Stop reason: SDUPTHER

## 2020-06-15 ASSESSMENT — ENCOUNTER SYMPTOMS
SHORTNESS OF BREATH: 1
WHEEZING: 1
MYALGIAS: 1
BACK PAIN: 1

## 2020-06-15 ASSESSMENT — PATIENT HEALTH QUESTIONNAIRE - PHQ9: CLINICAL INTERPRETATION OF PHQ2 SCORE: 0

## 2020-06-15 NOTE — PROGRESS NOTES
"Subjective:      Odalys Ballard is a 49 y.o. female who presents with Establish Care and Chest Pain (pressure on left side; x Feb went to see urgent care )  Is a new patient here to get established transferring care from other physicians in the community, but who wishes to get established with a [facility] network to enable better communication between office and patient, review of lab work and other orders.    Chief complaint is intermittent exertionally related left-sided upper chest pain and dyspnea as well as mid upper back pain for several months.  He has no known history of heart disease but has been treated for multiple environmental allergies as well as a history of exertionally related wheezing for which she takes albuterol.  She is not seeing a pulmonologist but would like to be referred to 1 for further evaluation of this.  She has no known history of heart disease and EKG done 4 years ago was normal.  She had an episode of \"bronchitis\" 4 months ago and chest x-ray was normal at that time.  She was treated with antibiotics but still has had lingering left upper chest discomfort aggravated by respirations.  She has had no leg swelling or calf pain and does not smoke or use estrogen compounds.    Also here for follow-up of her other medical problems including the following:  Patient Active Problem List    Diagnosis Date Noted   • Mild persistent asthma without complication 06/15/2020   • Family history of breast cancer gene mutation in first degree relative 06/15/2020   • Essential hypertension 06/15/2020   • Muscle spasm of back 06/15/2020   • Migraine without aura and with status migrainosus, not intractable 06/15/2020   Primary insomnia.      Allergies   Allergen Reactions   • Nkda [No Known Drug Allergy]         Current Outpatient Medications   Medication Sig Dispense Refill   • albuterol 108 (90 Base) MCG/ACT Aero Soln inhalation aerosol Inhale 2 Puffs by mouth every 6 hours as needed for Shortness of " Breath. 8.5 g 11   •        • baclofen (LIORESAL) 10 MG Tab Take 1 Tab by mouth 1 time daily as needed. 90 Tab 1   • Eszopiclone (LUNESTA) 3 MG Tab Take 1 Tab by mouth every evening for 90 days. 90 Tab 1   • albuterol 108 (90 BASE) MCG/ACT Aero Soln inhalation aerosol Inhale 2 Puffs by mouth every 6 hours as needed for Shortness of Breath.     • rizatriptan (MAXALT) 10 MG tablet Take 10 mg by mouth Once PRN. May repeat in 2 hours if needed       No current facility-administered medications for this visit.        No visits with results within 1 Month(s) from this visit.   Latest known visit with results is:   Hospital Outpatient Visit on 07/17/2019   Component Date Value   • Significant Indicator 07/17/2019 NEG    • Source 07/17/2019 STL    • Site 07/17/2019 STOOL    • Culture Result 07/17/2019                      Value:No enteric pathogens isolated.  NOTE:  Stool cultures are screened for Shiga Toxins 1 and 2,  Salmonella, Shigella, Campylobacter, Aeromonas,  Plesiomonas, and Vibrio.     • EHEC 07/17/2019 Negative for Shiga Toxin 1 and 2.    • Significant Indicator 07/17/2019 NEG    • Source 07/17/2019 STL    • Site 07/17/2019 STOOL    • Ova And Parasites Antige* 07/17/2019                      Value:Negative for Giardia lamblia antigen.  Negative for Cryptosporidium parvum antigen.  NOTE:  The Cryptosporidium/Giardia assay is a rapid test for the  presence or absence of these specific antigens.  In special  circumstances, a physician may need to request a complete  ova and parasite procedure when the patient meets certain  criteria. For example, recent travel abroad,immunosupression,  recent immigration, persistent undiagnosed diarrhea, or  persistent unexplained eosinophilia may be conditions to  warrant a complete ova and parasite examination.  In these  special cases, or if the physician suspects another specific  gastrointestinal parasite,the Microbiology Department can  perform a complete ova and parasite  microscopic examination.  The request for a complete ova and parasite examination must  come directly from the physician (or designee) within the  seven days of the original stool specimen being received in  the Microbiology Department.  Stool specimens are discarded  after                           seven days of storage.     • C Diff by PCR 07/17/2019 Negative    • [id number] Presumptive 07/17/2019 Negative    • Significant Indicator 07/17/2019 NEG    • Source 07/17/2019 STL    • Site 07/17/2019 STOOL    • EHEC 07/17/2019 Negative for Shiga Toxin 1 and 2.       No recent blood work done.    Additionally patient has had hypertension in the past.  Review of records shows that she has had consistently high blood pressure greater than 150.  And elevated heart rate on her last 3 medical encounters with [facility] providers.  This is been over the last year.  She has a blood pressure cuff at home but does not use it.  She is not following a low-salt diet.  She drinks no more than 2 alcoholic drinks per week.    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  She is  to another CVA who works for waste management company in [place].  They have 1 child together, a daughter  8 years old.    The patient is very avid athlete does a lot of mountain biking and fitness training and weightlifting.  He has been involved in fitness competitions in the past.  She feels like she may have strained something in her upper back from all her strength workouts.    There is a strong family history for breast cancer as well as other cancers in multiple family members.  She is seeing a  for for further evaluation of this she has 1 first-degree relative with a genetic mutation for breast cancer.  She is followed by a local surgeon and gets MRIs of her breast annually and patient is considering strongly getting bilateral prophylactic  "mastectomies.                      HPI    Review of Systems   Respiratory: Positive for shortness of breath and wheezing.    Cardiovascular: Positive for chest pain.   Musculoskeletal: Positive for back pain and myalgias.          Objective:     /98 (BP Location: Left arm, Patient Position: Sitting, BP Cuff Size: Adult)   Pulse (!) 116   Temp 37.6 °C (99.6 °F) (Temporal)   Ht 1.727 m (5' 8\")   Wt 70 kg (154 lb 6.4 oz)   LMP 10/08/2016   SpO2 97%   BMI 23.48 kg/m²    BP by me confirms hypertension with a BP of 160/100 and left arm sitting.  Rechecked and confirmed.  Physical Exam  Vitals signs reviewed.   Constitutional:       General: She is not in acute distress.     Appearance: She is well-developed. She is not diaphoretic.   HENT:      Head: Normocephalic and atraumatic.      Right Ear: External ear normal.      Left Ear: External ear normal.      Nose: Nose normal.      Mouth/Throat:      Pharynx: No oropharyngeal exudate.   Eyes:      General: No scleral icterus.        Right eye: No discharge.         Left eye: No discharge.      Conjunctiva/sclera: Conjunctivae normal.      Pupils: Pupils are equal, round, and reactive to light.   Neck:      Musculoskeletal: Normal range of motion and neck supple.      Thyroid: No thyromegaly.      Vascular: No JVD.      Trachea: No tracheal deviation.   Cardiovascular:      Rate and Rhythm: Normal rate and regular rhythm.      Heart sounds: Normal heart sounds. No murmur. No friction rub. No gallop.       Comments: No murmurs are heard.  Pulmonary:      Effort: Pulmonary effort is normal. No respiratory distress.      Breath sounds: Normal breath sounds. No stridor. No wheezing or rales.      Comments: No wheezes are heard  Chest:      Chest wall: No tenderness.   Abdominal:      General: Bowel sounds are normal. There is no distension.      Palpations: Abdomen is soft. There is no mass.      Tenderness: There is no abdominal tenderness. There is no guarding or " rebound.   Musculoskeletal: Normal range of motion.         General: Tenderness present.      Comments: There is some mild trigger point tenderness in the paraspinous lumbar and thoracic's paraspinous musculature and trapezius muscles particular on the left side.   Lymphadenopathy:      Cervical: No cervical adenopathy.   Skin:     General: Skin is warm and dry.      Coloration: Skin is not pale.      Findings: No erythema or rash.   Neurological:      Mental Status: She is alert and oriented to person, place, and time.      Cranial Nerves: No cranial nerve deficit.      Motor: No abnormal muscle tone.      Coordination: Coordination normal.      Deep Tendon Reflexes: Reflexes are normal and symmetric. Reflexes normal.   Psychiatric:         Behavior: Behavior normal.         Thought Content: Thought content normal.         Judgment: Judgment normal.     EKG done today and reviewed by me shows normal sinus rhythm and was essentially normal.  No significant ST segment changes of ischemia or injury.            Assessment/Plan:          1. Essential hypertension-this is a new problem but previously has been noted in the past and previously treated for this.  She has 3 documented hypertensive readings greater than 150 and therefore needs treatment.  There is a strong family history of hypertension.  - lisinopril (PRINIVIL) 10 MG Tab; Take 1 Tab by mouth every day.  Dispense: 90 Tab; Refill: 1  We will start her on lisinopril 10 mg daily.  Recheck in 3 weeks assess response.  Meantime get echocardiogram and EKG chest x-ray and screening lab work.     - EC-ECHOCARDIOGRAM COMPLETE W/O CONT; Future    2. Chest pain on breathing-this is unclear to me but may just be simply musculoskeletal.  We should get CT scan to rule out any structural pulmonary disorder as well as occult pulmonary emboli.  Echocardiogram to rule out mitral valve prolapse.  EKG shows no significant cardiac abnormalities.     - CT-CHEST (THORAX) WITH;  Future  - EKG  - EC-ECHOCARDIOGRAM COMPLETE W/O CONT; Future  - Sed Rate; Future    3. Mild persistent asthma without complication-this sounds like exercise-induced asthma.  She will use albuterol inhaler 2 puffs prior to exercise.     - REFERRAL TO PULMONOLOGY      4. Healthcare maintenance     - TSH; Future  - Comp Metabolic Panel; Future  - Lipid Profile; Future  - CBC WITH DIFFERENTIAL; Future    5. Vitamin D deficiency-check levels.     - VITAMIN D,25 HYDROXY; Future    6. Migraine without aura and with status migrainosus, not intractable  No recent migraines.  PRN treatment.  Maxalt as needed.    7. Muscle spasm of back     Under good control baclofen as needed.  - baclofen (LIORESAL) 10 MG Tab; Take 1 Tab by mouth 1 time daily as needed.  Dispense: 90 Tab; Refill: 1    8. Migraine without aura and without status migrainosus, not intractable  Under good control with as needed analgesics.    9. Family history of breast cancer gene mutation in first degree relative  Reviewed.  Continue follow-up with her breast surgeon for consideration for bilateral prophylactic mastectomies.    10. Primary insomnia-good control.  Continue Lunesta.  PRN.       - Eszopiclone (LUNESTA) 3 MG Tab; Take 1 Tab by mouth every evening for 90 days.  Dispense: 90 Tab; Refill: 1    .  At

## 2020-06-18 ENCOUNTER — HOSPITAL ENCOUNTER (OUTPATIENT)
Dept: RADIOLOGY | Facility: MEDICAL CENTER | Age: 50
End: 2020-06-18
Attending: OBSTETRICS & GYNECOLOGY
Payer: COMMERCIAL

## 2020-06-18 DIAGNOSIS — Z80.3 FAMILY HISTORY OF MALIGNANT NEOPLASM OF BREAST: ICD-10-CM

## 2020-06-18 DIAGNOSIS — Z84.81 FAMILY HISTORY OF BREAST CANCER GENE MUTATION IN FIRST DEGREE RELATIVE: ICD-10-CM

## 2020-06-18 PROCEDURE — 76830 TRANSVAGINAL US NON-OB: CPT

## 2020-06-19 DIAGNOSIS — R07.81 PLEURITIC CHEST PAIN: ICD-10-CM

## 2020-06-20 LAB
25(OH)D3+25(OH)D2 SERPL-MCNC: 56 NG/ML (ref 30–100)
ALBUMIN SERPL-MCNC: 5.1 G/DL (ref 3.8–4.8)
ALBUMIN/GLOB SERPL: 2.4 {RATIO} (ref 1.2–2.2)
ALP SERPL-CCNC: 48 IU/L (ref 39–117)
ALT SERPL-CCNC: 19 IU/L (ref 0–32)
AST SERPL-CCNC: 22 IU/L (ref 0–40)
BASOPHILS # BLD AUTO: 0.1 X10E3/UL (ref 0–0.2)
BASOPHILS NFR BLD AUTO: 1 %
BILIRUB SERPL-MCNC: 0.5 MG/DL (ref 0–1.2)
BUN SERPL-MCNC: 25 MG/DL (ref 6–24)
BUN/CREAT SERPL: 26 (ref 9–23)
CALCIUM SERPL-MCNC: 9.9 MG/DL (ref 8.7–10.2)
CHLORIDE SERPL-SCNC: 101 MMOL/L (ref 96–106)
CHOLEST SERPL-MCNC: 221 MG/DL (ref 100–199)
CO2 SERPL-SCNC: 21 MMOL/L (ref 20–29)
CREAT SERPL-MCNC: 0.96 MG/DL (ref 0.57–1)
EOSINOPHIL # BLD AUTO: 0.2 X10E3/UL (ref 0–0.4)
EOSINOPHIL NFR BLD AUTO: 3 %
ERYTHROCYTE [DISTWIDTH] IN BLOOD BY AUTOMATED COUNT: 11.6 % (ref 11.7–15.4)
ERYTHROCYTE [SEDIMENTATION RATE] IN BLOOD BY WESTERGREN METHOD: 2 MM/HR (ref 0–32)
GLOBULIN SER CALC-MCNC: 2.1 G/DL (ref 1.5–4.5)
GLUCOSE SERPL-MCNC: 109 MG/DL (ref 65–99)
HCT VFR BLD AUTO: 44.1 % (ref 34–46.6)
HDLC SERPL-MCNC: 87 MG/DL
HGB BLD-MCNC: 14.9 G/DL (ref 11.1–15.9)
IMM GRANULOCYTES # BLD AUTO: 0 X10E3/UL (ref 0–0.1)
IMM GRANULOCYTES NFR BLD AUTO: 0 %
IMMATURE CELLS  115398: ABNORMAL
LABORATORY COMMENT REPORT: ABNORMAL
LDLC SERPL CALC-MCNC: 121 MG/DL (ref 0–99)
LYMPHOCYTES # BLD AUTO: 1.7 X10E3/UL (ref 0.7–3.1)
LYMPHOCYTES NFR BLD AUTO: 29 %
MCH RBC QN AUTO: 31.6 PG (ref 26.6–33)
MCHC RBC AUTO-ENTMCNC: 33.8 G/DL (ref 31.5–35.7)
MCV RBC AUTO: 94 FL (ref 79–97)
MONOCYTES # BLD AUTO: 0.4 X10E3/UL (ref 0.1–0.9)
MONOCYTES NFR BLD AUTO: 8 %
MORPHOLOGY BLD-IMP: ABNORMAL
NEUTROPHILS # BLD AUTO: 3.3 X10E3/UL (ref 1.4–7)
NEUTROPHILS NFR BLD AUTO: 59 %
NRBC BLD AUTO-RTO: ABNORMAL %
PLATELET # BLD AUTO: 266 X10E3/UL (ref 150–450)
POTASSIUM SERPL-SCNC: 4.6 MMOL/L (ref 3.5–5.2)
PROT SERPL-MCNC: 7.2 G/DL (ref 6–8.5)
RBC # BLD AUTO: 4.71 X10E6/UL (ref 3.77–5.28)
SODIUM SERPL-SCNC: 140 MMOL/L (ref 134–144)
TRIGL SERPL-MCNC: 65 MG/DL (ref 0–149)
TSH SERPL DL<=0.005 MIU/L-ACNC: 1.49 UIU/ML (ref 0.45–4.5)
VLDLC SERPL CALC-MCNC: 13 MG/DL (ref 5–40)
WBC # BLD AUTO: 5.7 X10E3/UL (ref 3.4–10.8)

## 2020-06-24 ENCOUNTER — HOSPITAL ENCOUNTER (OUTPATIENT)
Dept: CARDIOLOGY | Facility: MEDICAL CENTER | Age: 50
End: 2020-06-24
Attending: INTERNAL MEDICINE
Payer: COMMERCIAL

## 2020-06-24 DIAGNOSIS — I10 ESSENTIAL HYPERTENSION: ICD-10-CM

## 2020-06-24 DIAGNOSIS — R07.1 CHEST PAIN ON BREATHING: ICD-10-CM

## 2020-06-24 LAB
LV EJECT FRACT  99904: 65
LV EJECT FRACT MOD 2C 99903: 64.93
LV EJECT FRACT MOD 4C 99902: 69.63
LV EJECT FRACT MOD BP 99901: 65.72

## 2020-06-24 PROCEDURE — 93306 TTE W/DOPPLER COMPLETE: CPT

## 2020-06-24 PROCEDURE — 93306 TTE W/DOPPLER COMPLETE: CPT | Mod: 26 | Performed by: INTERNAL MEDICINE

## 2020-07-17 ENCOUNTER — OFFICE VISIT (OUTPATIENT)
Dept: MEDICAL GROUP | Age: 50
End: 2020-07-17
Payer: COMMERCIAL

## 2020-07-17 VITALS
TEMPERATURE: 99.3 F | DIASTOLIC BLOOD PRESSURE: 90 MMHG | OXYGEN SATURATION: 97 % | SYSTOLIC BLOOD PRESSURE: 140 MMHG | HEIGHT: 68 IN | BODY MASS INDEX: 23.34 KG/M2 | WEIGHT: 154 LBS | HEART RATE: 117 BPM

## 2020-07-17 DIAGNOSIS — R73.01 IFG (IMPAIRED FASTING GLUCOSE): ICD-10-CM

## 2020-07-17 DIAGNOSIS — J45.30 MILD PERSISTENT ASTHMA WITHOUT COMPLICATION: ICD-10-CM

## 2020-07-17 DIAGNOSIS — I10 ESSENTIAL HYPERTENSION: ICD-10-CM

## 2020-07-17 DIAGNOSIS — M62.830 MUSCLE SPASM OF BACK: ICD-10-CM

## 2020-07-17 DIAGNOSIS — G43.001 MIGRAINE WITHOUT AURA AND WITH STATUS MIGRAINOSUS, NOT INTRACTABLE: ICD-10-CM

## 2020-07-17 DIAGNOSIS — F51.01 PRIMARY INSOMNIA: ICD-10-CM

## 2020-07-17 PROCEDURE — 99214 OFFICE O/P EST MOD 30 MIN: CPT | Performed by: INTERNAL MEDICINE

## 2020-07-17 RX ORDER — AZELASTINE HCL 205.5 UG/1
SPRAY NASAL
COMMUNITY
Start: 2020-07-09 | End: 2020-11-02

## 2020-07-17 RX ORDER — RIZATRIPTAN BENZOATE 10 MG/1
10 TABLET ORAL
Qty: 12 TAB | Refills: 4 | Status: SHIPPED | OUTPATIENT
Start: 2020-07-17 | End: 2021-01-24

## 2020-07-17 SDOH — HEALTH STABILITY: MENTAL HEALTH: HOW OFTEN DO YOU HAVE A DRINK CONTAINING ALCOHOL?: 2-3 TIMES A WEEK

## 2020-07-17 ASSESSMENT — ENCOUNTER SYMPTOMS
PSYCHIATRIC NEGATIVE: 1
EYES NEGATIVE: 1
CONSTITUTIONAL NEGATIVE: 1
RESPIRATORY NEGATIVE: 1
NEUROLOGICAL NEGATIVE: 1
GASTROINTESTINAL NEGATIVE: 1
CARDIOVASCULAR NEGATIVE: 1
MUSCULOSKELETAL NEGATIVE: 1

## 2020-07-17 ASSESSMENT — FIBROSIS 4 INDEX: FIB4 SCORE: 0.89

## 2020-07-17 NOTE — PROGRESS NOTES
Subjective:      Odalys Ballard is a 49 y.o. female who presents with Follow-Up   The patient is here for followup of chronic medical problems listed below. The patient is compliant with medications and having no side effects from them. Denies chest pain, abdominal pain, dyspnea, myalgias, or cough.   Patient Active Problem List    Diagnosis Date Noted   • Mild persistent asthma without complication 06/15/2020   • Family history of breast cancer gene mutation in first degree relative 06/15/2020   • Essential hypertension 06/15/2020   • Muscle spasm of back 06/15/2020   • Migraine without aura and with status migrainosus, not intractable 06/15/2020     Allergies   Allergen Reactions   • Nkda [No Known Drug Allergy]      Outpatient Medications Prior to Visit   Medication Sig Dispense Refill   • Azelastine HCl 0.15 % Solution      • albuterol 108 (90 Base) MCG/ACT Aero Soln inhalation aerosol Inhale 2 Puffs by mouth every 6 hours as needed for Shortness of Breath. 8.5 g 11   • lisinopril (PRINIVIL) 10 MG Tab Take 1 Tab by mouth every day. 90 Tab 1   • baclofen (LIORESAL) 10 MG Tab Take 1 Tab by mouth 1 time daily as needed. 90 Tab 1   • Eszopiclone (LUNESTA) 3 MG Tab Take 1 Tab by mouth every evening for 90 days. 90 Tab 1   • albuterol 108 (90 BASE) MCG/ACT Aero Soln inhalation aerosol Inhale 2 Puffs by mouth every 6 hours as needed for Shortness of Breath.     • rizatriptan (MAXALT) 10 MG tablet Take 10 mg by mouth Once PRN. May repeat in 2 hours if needed       No facility-administered medications prior to visit.                HPI    Review of Systems   Constitutional: Negative.    HENT: Negative.    Eyes: Negative.    Respiratory: Negative.    Cardiovascular: Negative.    Gastrointestinal: Negative.    Genitourinary: Negative.    Musculoskeletal: Negative.    Skin: Negative.    Neurological: Negative.    Endo/Heme/Allergies: Negative.    Psychiatric/Behavioral: Negative.           Objective:     /90 (BP  "Location: Left arm, Patient Position: Sitting, BP Cuff Size: Adult)   Pulse (!) 117   Temp 37.4 °C (99.3 °F) (Temporal)   Ht 1.727 m (5' 8\")   Wt 69.9 kg (154 lb)   LMP 10/08/2016   SpO2 97%   BMI 23.42 kg/m²      Physical Exam  Vitals signs reviewed.   Constitutional:       General: She is not in acute distress.     Appearance: She is well-developed. She is not diaphoretic.   HENT:      Head: Normocephalic and atraumatic.      Right Ear: External ear normal.      Left Ear: External ear normal.      Nose: Nose normal.      Mouth/Throat:      Pharynx: No oropharyngeal exudate.   Eyes:      General: No scleral icterus.        Right eye: No discharge.         Left eye: No discharge.      Conjunctiva/sclera: Conjunctivae normal.      Pupils: Pupils are equal, round, and reactive to light.   Neck:      Musculoskeletal: Normal range of motion and neck supple.      Thyroid: No thyromegaly.      Vascular: No JVD.      Trachea: No tracheal deviation.   Cardiovascular:      Rate and Rhythm: Normal rate and regular rhythm.      Heart sounds: Normal heart sounds. No murmur. No friction rub. No gallop.    Pulmonary:      Effort: Pulmonary effort is normal. No respiratory distress.      Breath sounds: Normal breath sounds. No stridor. No wheezing or rales.   Chest:      Chest wall: No tenderness.   Abdominal:      General: Bowel sounds are normal. There is no distension.      Palpations: Abdomen is soft. There is no mass.      Tenderness: There is no abdominal tenderness. There is no guarding or rebound.   Musculoskeletal: Normal range of motion.         General: No tenderness.   Lymphadenopathy:      Cervical: No cervical adenopathy.   Skin:     General: Skin is warm and dry.      Coloration: Skin is not pale.      Findings: No erythema or rash.   Neurological:      Mental Status: She is alert and oriented to person, place, and time.      Cranial Nerves: No cranial nerve deficit.      Motor: No abnormal muscle tone.      " Coordination: Coordination normal.      Deep Tendon Reflexes: Reflexes are normal and symmetric. Reflexes normal.   Psychiatric:         Behavior: Behavior normal.         Thought Content: Thought content normal.         Judgment: Judgment normal.            Hospital Outpatient Visit on 06/24/2020   Component Date Value   • Eject.Frac. MOD BP 06/24/2020 65.72    • Eject.Frac. MOD 4C 06/24/2020 69.63    • Eject.Frac. MOD 2C 06/24/2020 64.93    • Left Ventrical Ejection * 06/24/2020 65    Orders Only on 06/19/2020   Component Date Value   • WBC 06/19/2020 5.7    • RBC 06/19/2020 4.71    • Hemoglobin 06/19/2020 14.9    • Hematocrit 06/19/2020 44.1    • MCV 06/19/2020 94    • MCH 06/19/2020 31.6    • MCHC 06/19/2020 33.8    • RDW 06/19/2020 11.6*   • Platelet Count 06/19/2020 266    • Neutrophils-Polys 06/19/2020 59    • Lymphocytes 06/19/2020 29    • Monocytes 06/19/2020 8    • Eosinophils 06/19/2020 3    • Basophils 06/19/2020 1    • Immature Cells 06/19/2020 CANCELED    • Neutrophils (Absolute) 06/19/2020 3.3    • Lymphs (Absolute) 06/19/2020 1.7    • Monos (Absolute) 06/19/2020 0.4    • Eos (Absolute) 06/19/2020 0.2    • Baso (Absolute) 06/19/2020 0.1    • Immature Granulocytes 06/19/2020 0    • Immature Granulocytes (a* 06/19/2020 0.0    • Nucleated RBC 06/19/2020 CANCELED    • Comments-Diff 06/19/2020 CANCELED    • Glucose 06/19/2020 109*   • Bun 06/19/2020 25*   • Creatinine 06/19/2020 0.96    • GFR If Non  Ameri* 06/19/2020 71    • GFR If  06/19/2020 81    • Bun-Creatinine Ratio 06/19/2020 26*   • Sodium 06/19/2020 140    • Potassium 06/19/2020 4.6    • Chloride 06/19/2020 101    • Co2 06/19/2020 21    • Calcium 06/19/2020 9.9    • Total Protein 06/19/2020 7.2    • Albumin 06/19/2020 5.1*   • Globulin 06/19/2020 2.1    • A-G Ratio 06/19/2020 2.4*   • Total Bilirubin 06/19/2020 0.5    • Alkaline Phosphatase 06/19/2020 48    • AST(SGOT) 06/19/2020 22    • ALT(SGPT) 06/19/2020 19    •  Cholesterol,Tot 06/19/2020 221*   • Triglycerides 06/19/2020 65    • HDL 06/19/2020 87    • VLDL Cholesterol Calc 06/19/2020 13    • LDL 06/19/2020 121*   • Comment: 06/19/2020 CANCELED    • TSH 06/19/2020 1.490    • 25-Hydroxy   Vitamin D 25 06/19/2020 56.0    • Sed Rate Westergren 06/19/2020 2       No results found for: HBA1C  Lab Results   Component Value Date/Time    SODIUM 140 06/19/2020 06:34 AM    SODIUM 138 10/10/2016 03:42 PM    POTASSIUM 4.6 06/19/2020 06:34 AM    POTASSIUM 3.7 10/10/2016 03:42 PM    CHLORIDE 101 06/19/2020 06:34 AM    CHLORIDE 105 10/10/2016 03:42 PM    CO2 21 06/19/2020 06:34 AM    CO2 23 10/10/2016 03:42 PM    GLUCOSE 109 (H) 06/19/2020 06:34 AM    GLUCOSE 90 10/10/2016 03:42 PM    BUN 25 (H) 06/19/2020 06:34 AM    BUN 21 10/10/2016 03:42 PM    CREATININE 0.96 06/19/2020 06:34 AM    CREATININE 1.03 10/10/2016 03:42 PM    BUNCREATRAT 26 (H) 06/19/2020 06:34 AM    ALKPHOSPHAT 48 06/19/2020 06:34 AM    ASTSGOT 22 06/19/2020 06:34 AM    ALTSGPT 19 06/19/2020 06:34 AM    TBILIRUBIN 0.5 06/19/2020 06:34 AM     No results found for: INR  Lab Results   Component Value Date/Time    CHOLSTRLTOT 221 (H) 06/19/2020 06:34 AM     (H) 06/19/2020 06:34 AM    HDL 87 06/19/2020 06:34 AM    TRIGLYCERIDE 65 06/19/2020 06:34 AM       No results found for: TESTOSTERONE  Lab Results   Component Value Date/Time    TSH 1.490 06/19/2020 06:34 AM     No results found for: FREET4  No results found for: URICACID  No components found for: VITB12  Lab Results   Component Value Date/Time    25HYDROXY 56.0 06/19/2020 06:34 AM     Schedule at 740     Assessment/Plan:        1. Essential hypertension-much improved and now borderline control.  But outside BP readings are all in the 1/21/1930 area systolic now.  We will therefore continue to monitor BP at home and continue her on 10 mg lisinopril.  She thinks she might have a little tickle in her throat but I explained this is probably something she has had before  and may just be from allergies but if it gets worse we will consider switching her to an ARB.      - Comp Metabolic Panel; Future    2. Migraine without aura and with status migrainosus, not intractable   Under good control. Continue same regimen.    - rizatriptan (MAXALT) 10 MG tablet; Take 1 Tab by mouth 1 time daily as needed for Migraine.  Dispense: 12 Tab; Refill: 4    3. Mild persistent asthma without complication  Under good control. Continue same regimen.       4. IFG (impaired fasting glucose)  Under good control. Continue same regimen.     - Comp Metabolic Panel; Future  - HEMOGLOBIN A1C; Future    5. Muscle spasm of back  Under good control. Continue same regimen.       6. Primary insomnia   Under good control. Continue same r regimen    Recheck in 2 months for BP recheck, and to the fasting glucose that was slightly elevated and A1c.

## 2020-07-22 ENCOUNTER — OFFICE VISIT (OUTPATIENT)
Dept: PULMONOLOGY | Facility: HOSPICE | Age: 50
End: 2020-07-22
Payer: COMMERCIAL

## 2020-07-22 VITALS
SYSTOLIC BLOOD PRESSURE: 144 MMHG | OXYGEN SATURATION: 96 % | HEIGHT: 67 IN | DIASTOLIC BLOOD PRESSURE: 90 MMHG | BODY MASS INDEX: 23.54 KG/M2 | RESPIRATION RATE: 16 BRPM | TEMPERATURE: 98.6 F | HEART RATE: 86 BPM | WEIGHT: 150 LBS

## 2020-07-22 DIAGNOSIS — J45.40 MODERATE PERSISTENT ASTHMA WITHOUT COMPLICATION: ICD-10-CM

## 2020-07-22 DIAGNOSIS — R07.89 CHEST WALL PAIN: ICD-10-CM

## 2020-07-22 DIAGNOSIS — J30.2 SEASONAL ALLERGIES: ICD-10-CM

## 2020-07-22 PROCEDURE — 99204 OFFICE O/P NEW MOD 45 MIN: CPT | Performed by: INTERNAL MEDICINE

## 2020-07-22 RX ORDER — FLUTICASONE PROPIONATE AND SALMETEROL XINAFOATE 45; 21 UG/1; UG/1
2 AEROSOL, METERED RESPIRATORY (INHALATION) EVERY 4 HOURS PRN
Qty: 45 G | Refills: 11 | Status: SHIPPED | OUTPATIENT
Start: 2020-07-22 | End: 2020-07-22

## 2020-07-22 RX ORDER — FLUTICASONE PROPIONATE AND SALMETEROL XINAFOATE 45; 21 UG/1; UG/1
2 AEROSOL, METERED RESPIRATORY (INHALATION) 2 TIMES DAILY
Qty: 1 G | Refills: 5 | Status: SHIPPED | OUTPATIENT
Start: 2020-07-22 | End: 2020-08-21

## 2020-07-22 SDOH — HEALTH STABILITY: MENTAL HEALTH: HOW MANY STANDARD DRINKS CONTAINING ALCOHOL DO YOU HAVE ON A TYPICAL DAY?: 1 OR 2

## 2020-07-22 SDOH — HEALTH STABILITY: MENTAL HEALTH: HOW OFTEN DO YOU HAVE 6 OR MORE DRINKS ON ONE OCCASION?: NEVER

## 2020-07-22 ASSESSMENT — ENCOUNTER SYMPTOMS
EYE DISCHARGE: 0
DEPRESSION: 0
WEIGHT LOSS: 0
COUGH: 1
NAUSEA: 0
WEAKNESS: 0
SPUTUM PRODUCTION: 0
EYE REDNESS: 0
MYALGIAS: 0
EYE PAIN: 0
HEMOPTYSIS: 0
CHILLS: 0
ORTHOPNEA: 0
VOMITING: 0
WHEEZING: 0
BACK PAIN: 1
SPEECH CHANGE: 0
DIZZINESS: 0
HEARTBURN: 0
CLAUDICATION: 0
FEVER: 0
PND: 0
ABDOMINAL PAIN: 0
TREMORS: 0
SHORTNESS OF BREATH: 1
PHOTOPHOBIA: 0
SINUS PAIN: 0
DIAPHORESIS: 0
NECK PAIN: 0
STRIDOR: 0
CONSTIPATION: 0
DOUBLE VISION: 0
BLURRED VISION: 0
FALLS: 0
PALPITATIONS: 0
DIARRHEA: 0
FOCAL WEAKNESS: 0
SORE THROAT: 0
HEADACHES: 0

## 2020-07-22 ASSESSMENT — FIBROSIS 4 INDEX: FIB4 SCORE: 0.89

## 2020-07-22 NOTE — PROGRESS NOTES
Chief Complaint   Patient presents with   • Establish Care     referral 6/15/2020 JENNIFER Moralez MD DX asthma    • Results     CXR 2/5/2020       HPI: This patient is a 49 y.o. female presenting for evaluation of asthma and chronic L sided chest wall pain.  The patient's past medical history significant for hypertension currently treated with lisinopril, seasonal allergies with associated postnasal drip and sinusitis for which she is followed by ENT and currently on sublingual allergy drops, mild intermittent asthma treated with only as needed bronchodilator.  She is a lifelong non-smoker.  No known occupational or environmental exposures.  Family history is notable for father with asthma.  The patient is status post hysterectomy for uterine fibroids and has a history of severe endometriosis.  She presents today for 2 reasons, the first being asthma that has worsened since relocating to [place].  She currently uses her rescue inhaler up to 3 times daily.  She has not been treated for acute exacerbation with prednisone.  In addition to this, the patient has chronic left-sided chest wall pain.  She reports that when it first began she attribute it to her exercise habits which include heavy lifting and regular Filer use.  Leaning forward position on the exercise bike exacerbated her pain which involves the left breast, axilla, left arm and left scapula area.  The area often feels swollen after exercise and sore although was not tender to touch.  She has some cystic breast disease on that side but otherwise no clear breast disease.  She did have a respiratory tract infection in October which seemed to exacerbate the symptoms although they were improving until February when her asthma symptoms worsened including cough, shortness of breath and wheezing with increased use of rescue inhaler.  Her left-sided chest pain and associated swelling seems to worsen when she is coughing frequently or breathing deeply.  Plain chest film  from February of this year was essentially unremarkable.  She did have some travel prior to onset of symptoms in October although has no other signs or symptoms to suggest pulmonary embolism, a VQ scan was ordered by her primary care provider and is scheduled for this Friday.  A CT chest was declined due to absence of shortness of breath.  The patient denies fevers, chills, night sweats, weight loss.  She is an avid exerciser as per above.  She is on lisinopril for blood pressure which is fairly new and cough has worsened since during this medication.    Past Medical History:   Diagnosis Date   • ASTHMA     prn inhaler   • Bronchitis 2015   • Heart burn    • Hiatus hernia syndrome    • Other specified symptom associated with female genital organs     endometriosis, fibroids   • Pneumonia 2010       Social History     Socioeconomic History   • Marital status:      Spouse name: Not on file   • Number of children: Not on file   • Years of education: Not on file   • Highest education level: Not on file   Occupational History   • Not on file   Social Needs   • Financial resource strain: Not on file   • Food insecurity     Worry: Not on file     Inability: Not on file   • Transportation needs     Medical: Not on file     Non-medical: Not on file   Tobacco Use   • Smoking status: Never Smoker   • Smokeless tobacco: Never Used   Substance and Sexual Activity   • Alcohol use: Yes     Frequency: 2-3 times a week     Comment: 3 per week   • Drug use: No   • Sexual activity: Not on file   Lifestyle   • Physical activity     Days per week: Not on file     Minutes per session: Not on file   • Stress: Not on file   Relationships   • Social connections     Talks on phone: Not on file     Gets together: Not on file     Attends [employer] service: Not on file     Active member of club or organization: Not on file     Attends meetings of clubs or organizations: Not on file     Relationship status: Not on file   • Intimate  partner violence     Fear of current or ex partner: Not on file     Emotionally abused: Not on file     Physically abused: Not on file     Forced sexual activity: Not on file   Other Topics Concern   • Not on file   Social History Narrative   • Not on file       Family History   Problem Relation Age of Onset   • Diabetes Other    • Heart Disease Other    • Hypertension Other    • Cancer Other        Current Outpatient Medications on File Prior to Visit   Medication Sig Dispense Refill   • Azelastine HCl 0.15 % Solution      • rizatriptan (MAXALT) 10 MG tablet Take 1 Tab by mouth 1 time daily as needed for Migraine. 12 Tab 4   • albuterol 108 (90 Base) MCG/ACT Aero Soln inhalation aerosol Inhale 2 Puffs by mouth every 6 hours as needed for Shortness of Breath. 8.5 g 11   • lisinopril (PRINIVIL) 10 MG Tab Take 1 Tab by mouth every day. 90 Tab 1   • baclofen (LIORESAL) 10 MG Tab Take 1 Tab by mouth 1 time daily as needed. 90 Tab 1   • Eszopiclone (LUNESTA) 3 MG Tab Take 1 Tab by mouth every evening for 90 days. 90 Tab 1     No current facility-administered medications on file prior to visit.        Allergies: Nkda [no known drug allergy]    ROS:   Review of Systems   Constitutional: Negative for chills, diaphoresis, fever, malaise/fatigue and weight loss.   HENT: Positive for congestion. Negative for ear discharge, ear pain, hearing loss, nosebleeds, sinus pain, sore throat and tinnitus.    Eyes: Negative for blurred vision, double vision, photophobia, pain, discharge and redness.   Respiratory: Positive for cough and shortness of breath. Negative for hemoptysis, sputum production, wheezing and stridor.    Cardiovascular: Positive for chest pain. Negative for palpitations, orthopnea, claudication, leg swelling and PND.   Gastrointestinal: Negative for abdominal pain, constipation, diarrhea, heartburn, nausea and vomiting.   Genitourinary: Negative for dysuria and urgency.   Musculoskeletal: Positive for back pain and  joint pain. Negative for falls, myalgias and neck pain.   Skin: Negative for itching and rash.   Neurological: Negative for dizziness, tremors, speech change, focal weakness, weakness and headaches.   Endo/Heme/Allergies: Negative for environmental allergies.   Psychiatric/Behavioral: Negative for depression.       There were no vitals taken for this visit.    Physical Exam:  Physical Exam  Constitutional:       General: She is not in acute distress.     Appearance: Normal appearance. She is well-developed and normal weight.   HENT:      Head: Normocephalic and atraumatic.      Right Ear: External ear normal.      Left Ear: External ear normal.      Nose: Nose normal. No congestion.      Mouth/Throat:      Mouth: Mucous membranes are moist.      Pharynx: Oropharynx is clear. No oropharyngeal exudate.   Eyes:      General: No scleral icterus.     Extraocular Movements: Extraocular movements intact.      Conjunctiva/sclera: Conjunctivae normal.      Pupils: Pupils are equal, round, and reactive to light.   Neck:      Musculoskeletal: Normal range of motion and neck supple.      Vascular: No JVD.      Trachea: No tracheal deviation.   Cardiovascular:      Rate and Rhythm: Normal rate and regular rhythm.      Heart sounds: Normal heart sounds. No murmur. No friction rub. No gallop.    Pulmonary:      Effort: No accessory muscle usage or respiratory distress.      Breath sounds: Normal breath sounds. No wheezing or rales.   Abdominal:      General: There is no distension.      Palpations: Abdomen is soft.      Tenderness: There is no abdominal tenderness.   Musculoskeletal: Normal range of motion.         General: No tenderness or deformity.      Right lower leg: No edema.      Left lower leg: No edema.   Lymphadenopathy:      Cervical: No cervical adenopathy.   Skin:     General: Skin is warm and dry.      Findings: No rash.      Nails: There is no clubbing.     Neurological:      Mental Status: She is alert and  oriented to person, place, and time.      Cranial Nerves: No cranial nerve deficit.      Gait: Gait normal.   Psychiatric:         Mood and Affect: Mood normal.         Behavior: Behavior normal.         PFTs as reviewed by me personally:none    Imaging as reviewed by me personally:as per hpi    Assessment:  1. Moderate persistent asthma without complication  fluticasone-salmeterol (ADVAIR HFA) 45-21 MCG/ACT inhaler    PULMONARY FUNCTION TESTS -Test requested: Complete Pulmonary Function Test   2. Seasonal allergies     3. Chest wall pain  CT-CHEST (THORAX) WITH       Plan:  1.  Patient has an increase in asthma symptoms with onset of spring suggesting significant atopic component.  She is on allergy shots and being treated for this with ENT.  I do think she would benefit from initiation of controller therapy and we will start Advair HFA low-dose and continue short acting bronchodilators as needed.  I will see her back in 8 to 10 weeks with full pulmonary function test and to reassess symptoms.  2.  I do suspect there is a significant atopic component to her respiratory symptoms and currently she is being treated for allergies with sublingual drops as well as over-the-counter, second-generation antihistamine and intranasal steroids.  See above regarding starting controller therapy for asthma.  3.  This is chronic and I have low suspicion that it is pulmonary in etiology but suspect it is more chest wall pain.  She has some elements to suggest possible venous thoracic outlet syndrome.  Blood pressure was equal in both arms today.  I would like to order a CT chest with contrast not to look for blood clots given she has VQ scan scheduled however to evaluate the vasculature in the neck left arm and chest and rule out any underlying lung pathology on that side.  Return in about 10 weeks (around 9/30/2020) for pfts, ct chest with contrast.

## 2020-07-24 ENCOUNTER — HOSPITAL ENCOUNTER (OUTPATIENT)
Dept: RADIOLOGY | Facility: MEDICAL CENTER | Age: 50
End: 2020-07-24
Attending: INTERNAL MEDICINE
Payer: COMMERCIAL

## 2020-07-24 DIAGNOSIS — R07.81 PLEURITIC CHEST PAIN: ICD-10-CM

## 2020-07-24 PROCEDURE — 71046 X-RAY EXAM CHEST 2 VIEWS: CPT

## 2020-07-24 PROCEDURE — A9540 TC99M MAA: HCPCS

## 2020-07-30 ENCOUNTER — PATIENT MESSAGE (OUTPATIENT)
Dept: MEDICAL GROUP | Age: 50
End: 2020-07-30

## 2020-07-30 RX ORDER — LOSARTAN POTASSIUM 25 MG/1
25 TABLET ORAL DAILY
Qty: 90 TAB | Refills: 1 | Status: SHIPPED | OUTPATIENT
Start: 2020-07-30 | End: 2021-01-28 | Stop reason: SDUPTHER

## 2020-08-04 ENCOUNTER — PATIENT MESSAGE (OUTPATIENT)
Dept: MEDICAL GROUP | Age: 50
End: 2020-08-04

## 2020-08-04 DIAGNOSIS — F51.01 PRIMARY INSOMNIA: ICD-10-CM

## 2020-08-04 RX ORDER — ESZOPICLONE 3 MG/1
3 TABLET, FILM COATED ORAL EVERY EVENING
Qty: 90 TAB | Refills: 1 | Status: SHIPPED | OUTPATIENT
Start: 2020-08-04 | End: 2021-01-28 | Stop reason: SDUPTHER

## 2020-08-05 ENCOUNTER — APPOINTMENT (OUTPATIENT)
Dept: RADIOLOGY | Facility: MEDICAL CENTER | Age: 50
End: 2020-08-05
Attending: INTERNAL MEDICINE
Payer: COMMERCIAL

## 2020-09-10 ENCOUNTER — HOSPITAL ENCOUNTER (OUTPATIENT)
Dept: RADIOLOGY | Facility: MEDICAL CENTER | Age: 50
End: 2020-09-10
Attending: INTERNAL MEDICINE
Payer: COMMERCIAL

## 2020-09-10 DIAGNOSIS — R07.89 CHEST WALL PAIN: ICD-10-CM

## 2020-09-10 PROCEDURE — 700117 HCHG RX CONTRAST REV CODE 255: Performed by: INTERNAL MEDICINE

## 2020-09-10 PROCEDURE — 71260 CT THORAX DX C+: CPT

## 2020-09-10 RX ADMIN — IOHEXOL 75 ML: 350 INJECTION, SOLUTION INTRAVENOUS at 14:30

## 2020-09-15 LAB
ALBUMIN SERPL-MCNC: 4.9 G/DL (ref 3.8–4.8)
ALBUMIN/GLOB SERPL: 2.6 {RATIO} (ref 1.2–2.2)
ALP SERPL-CCNC: 56 IU/L (ref 39–117)
ALT SERPL-CCNC: 41 IU/L (ref 0–32)
AST SERPL-CCNC: 24 IU/L (ref 0–40)
BILIRUB SERPL-MCNC: 0.4 MG/DL (ref 0–1.2)
BUN SERPL-MCNC: 25 MG/DL (ref 6–24)
BUN/CREAT SERPL: 26 (ref 9–23)
CALCIUM SERPL-MCNC: 9.9 MG/DL (ref 8.7–10.2)
CHLORIDE SERPL-SCNC: 106 MMOL/L (ref 96–106)
CO2 SERPL-SCNC: 24 MMOL/L (ref 20–29)
CREAT SERPL-MCNC: 0.95 MG/DL (ref 0.57–1)
GLOBULIN SER CALC-MCNC: 1.9 G/DL (ref 1.5–4.5)
GLUCOSE SERPL-MCNC: 94 MG/DL (ref 65–99)
HBA1C MFR BLD: 5.4 % (ref 4.8–5.6)
POTASSIUM SERPL-SCNC: 4.7 MMOL/L (ref 3.5–5.2)
PROT SERPL-MCNC: 6.8 G/DL (ref 6–8.5)
SODIUM SERPL-SCNC: 142 MMOL/L (ref 134–144)

## 2020-09-21 ENCOUNTER — NON-PROVIDER VISIT (OUTPATIENT)
Dept: PULMONOLOGY | Facility: HOSPICE | Age: 50
End: 2020-09-21
Attending: INTERNAL MEDICINE
Payer: COMMERCIAL

## 2020-09-21 VITALS — HEIGHT: 67 IN | WEIGHT: 156 LBS | BODY MASS INDEX: 24.48 KG/M2

## 2020-09-21 PROCEDURE — 94726 PLETHYSMOGRAPHY LUNG VOLUMES: CPT | Performed by: INTERNAL MEDICINE

## 2020-09-21 PROCEDURE — 94729 DIFFUSING CAPACITY: CPT | Performed by: INTERNAL MEDICINE

## 2020-09-21 PROCEDURE — 94060 EVALUATION OF WHEEZING: CPT | Performed by: INTERNAL MEDICINE

## 2020-09-21 ASSESSMENT — PULMONARY FUNCTION TESTS
FVC_PERCENT_PREDICTED: 93
FEV1_PERCENT_PREDICTED: 92
FEV1/FVC_PERCENT_PREDICTED: 98
FEV1_PERCENT_CHANGE: 3
FEV1/FVC_PREDICTED: 81
FEV1/FVC_PERCENT_PREDICTED: 94
FEV1/FVC_PERCENT_LLN: 67
FEV1/FVC: 76
FVC_LLN: 3.27
FEV1_PERCENT_CHANGE: -1
FEV1_LLN: 2.62
FEV1/FVC_PERCENT_PREDICTED: 95
FEV1/FVC: 79.18
FEV1_PREDICTED: 3.13
FVC_PREDICTED: 3.91
FEV1/FVC: 79
FEV1: 2.79
FEV1/FVC_PERCENT_PREDICTED: 80
FVC: 3.69
FEV1: 2.89
FEV1/FVC_PERCENT_CHANGE: 4
FEV1/FVC_PERCENT_PREDICTED: 99
FEV1/FVC_PERCENT_CHANGE: -300
FVC_PERCENT_PREDICTED: 94
FEV1/FVC: 76
FEV1_PERCENT_PREDICTED: 89
FVC: 3.65

## 2020-09-21 ASSESSMENT — FIBROSIS 4 INDEX: FIB4 SCORE: 0.66

## 2020-09-21 NOTE — PROCEDURES
Technician: ELISABETH Medina    Technician Comment:  Good patient effort & cooperation.  The results of this test meet the ATS/ERS standards for acceptability & reproducibility.  Test was performed on the [employer] Body Plethysmograph-Elite DX system.  Predicted values were GLI-2012 for spirometry, GLI-2017 for DLCO, ITS for Lung Volumes.  The DLCO was uncorrected for Hgb.  A bronchodilator of Ventolin HFA -2puffs via spacer administered.  DLCO performed during dilation period.    Interpretation:  1.  Baseline spirometry shows normal airflows.  2.  There is no significant bronchodilator response.  3.  Lung volumes are within normal limits.  4.  Diffusion capacity is elevated at 131% predicted.  Pulmonary function testing is within normal limits.  There is some evidence for obstruction with bronchodilator response in mid airflows however this can be effort dependent.  This finding an elevated DLCO could be consistent with reactive airways disease.  Suggest clinical correlation.

## 2020-09-25 ENCOUNTER — OFFICE VISIT (OUTPATIENT)
Dept: PULMONOLOGY | Facility: HOSPICE | Age: 50
End: 2020-09-25
Payer: COMMERCIAL

## 2020-09-25 VITALS
BODY MASS INDEX: 23.54 KG/M2 | HEART RATE: 93 BPM | HEIGHT: 67 IN | RESPIRATION RATE: 16 BRPM | OXYGEN SATURATION: 94 % | WEIGHT: 150 LBS | DIASTOLIC BLOOD PRESSURE: 70 MMHG | SYSTOLIC BLOOD PRESSURE: 130 MMHG

## 2020-09-25 DIAGNOSIS — J45.40 MODERATE PERSISTENT ASTHMA WITHOUT COMPLICATION: ICD-10-CM

## 2020-09-25 DIAGNOSIS — R07.89 CHEST WALL PAIN: ICD-10-CM

## 2020-09-25 DIAGNOSIS — J30.2 SEASONAL ALLERGIES: ICD-10-CM

## 2020-09-25 PROCEDURE — 99214 OFFICE O/P EST MOD 30 MIN: CPT | Performed by: INTERNAL MEDICINE

## 2020-09-25 ASSESSMENT — ENCOUNTER SYMPTOMS
DIARRHEA: 0
HEMOPTYSIS: 0
CONSTIPATION: 0
MYALGIAS: 0
PHOTOPHOBIA: 0
HEARTBURN: 0
BACK PAIN: 0
DEPRESSION: 0
SHORTNESS OF BREATH: 1
EYE DISCHARGE: 0
HEADACHES: 0
CLAUDICATION: 0
SINUS PAIN: 0
NECK PAIN: 1
WEAKNESS: 0
SORE THROAT: 0
COUGH: 1
PALPITATIONS: 0
NAUSEA: 0
ABDOMINAL PAIN: 0
VOMITING: 0
DIZZINESS: 0
DIAPHORESIS: 0
SPUTUM PRODUCTION: 1
WHEEZING: 0
SPEECH CHANGE: 0
WEIGHT LOSS: 0
BLURRED VISION: 0
PND: 0
TREMORS: 0
STRIDOR: 0
FALLS: 0
ORTHOPNEA: 0
EYE REDNESS: 0
CHILLS: 0
DOUBLE VISION: 0
FEVER: 0
EYE PAIN: 0
FOCAL WEAKNESS: 0

## 2020-09-25 ASSESSMENT — FIBROSIS 4 INDEX: FIB4 SCORE: 0.66

## 2020-09-25 NOTE — PROGRESS NOTES
Chief Complaint   Patient presents with   • Follow-Up     Moderate persistent Asthma without Complication // Last Seen 7/22/2020    • Results     CT 9/10/2020 // PFT 9/21/2020         HPI: This patient is a 49 y.o. female whom is followed in our clinic for asthma and non-specific chest wall pain last seen by me on 7/22/20.  The patient's past medical history significant for hypertension previously treated with lisinopril but was changed to losartan as of our last appointment, seasonal allergies with associated postnasal drip and sinusitis for which she is followed by ENT and currently on sublingual allergy drops, mild intermittent asthma treated with only as needed bronchodilator.  She is a lifelong non-smoker.  She was referred to me in July for asthma that had worsened since relocating to [place].  She was using a rescue inhaler up to 3 times daily after which we started Advair with good response.  She had never required treatment for acute exacerbation with prednisone.  She was also reporting left-sided chest wall pain which she had attribute it to exercise habits including heavy lifting in the past.  Leaning forward on her exercise bike exacerbates the pain which involves her left breast, axilla, left arm and left scapula.  All symptoms are also exacerbated by cough.  Symptoms seem to worsen after respiratory tract infection in October of last year.  We plan to start Advair as per above and obtain pulmonary function testing as well as CT chest with contrast to rule out thoracic outlet syndrome.  CT chest was within normal limits with no vascular abnormalities, muscular deformities or pulmonary parenchymal issues.  She had a VQ scan to rule out blood clots in July and pulmonary function testing done September 21 showed normal airflows, normal lung volumes, no bronchodilator response, elevated diffusion capacity.  The patient presents today for follow-up.  She does feel that the Advair has helped the symptoms both  cough and shortness of breath as well as some of the pain she experiences on the left side with activity.  Today she is less clear whether or not this pain is purely musculoskeletal or in part related to asthma.  She does have some mucus following exercise if she does not pretreat with albuterol despite the Advair use and occasionally taking a decongestant for this.  No new symptoms.    Past Medical History:   Diagnosis Date   • ASTHMA     prn inhaler   • Bronchitis 2015   • Heart burn    • Hiatus hernia syndrome    • Other specified symptom associated with female genital organs     endometriosis, fibroids   • Pneumonia 2010       Social History     Socioeconomic History   • Marital status:      Spouse name: Not on file   • Number of children: Not on file   • Years of education: Not on file   • Highest education level: Not on file   Occupational History   • Not on file   Social Needs   • Financial resource strain: Not on file   • Food insecurity     Worry: Not on file     Inability: Not on file   • Transportation needs     Medical: Not on file     Non-medical: Not on file   Tobacco Use   • Smoking status: Never Smoker   • Smokeless tobacco: Never Used   Substance and Sexual Activity   • Alcohol use: Yes     Frequency: 2-3 times a week     Drinks per session: 1 or 2     Binge frequency: Never     Comment: 3 per week   • Drug use: No   • Sexual activity: Not on file   Lifestyle   • Physical activity     Days per week: Not on file     Minutes per session: Not on file   • Stress: Not on file   Relationships   • Social connections     Talks on phone: Not on file     Gets together: Not on file     Attends Spiritism service: Not on file     Active member of club or organization: Not on file     Attends meetings of clubs or organizations: Not on file     Relationship status: Not on file   • Intimate partner violence     Fear of current or ex partner: Not on file     Emotionally abused: Not on file     Physically  abused: Not on file     Forced sexual activity: Not on file   Other Topics Concern   • Not on file   Social History Narrative   • Not on file       Family History   Problem Relation Age of Onset   • Diabetes Other    • Heart Disease Other    • Hypertension Other    • Cancer Other        Current Outpatient Medications on File Prior to Visit   Medication Sig Dispense Refill   • Eszopiclone (LUNESTA) 3 MG Tab Take 1 Tab by mouth every evening for 90 days. 90 Tab 1   • baclofen (LIORESAL) 10 MG Tab Take 1 Tab by mouth 1 time daily as needed. 90 Tab 1   • losartan (COZAAR) 25 MG Tab Take 1 Tab by mouth every day. 90 Tab 1   • rizatriptan (MAXALT) 10 MG tablet Take 1 Tab by mouth 1 time daily as needed for Migraine. 12 Tab 4   • albuterol 108 (90 Base) MCG/ACT Aero Soln inhalation aerosol Inhale 2 Puffs by mouth every 6 hours as needed for Shortness of Breath. 8.5 g 11   • Azelastine HCl 0.15 % Solution        No current facility-administered medications on file prior to visit.        Lisinopril and Nkda [no known drug allergy]      ROS:   Review of Systems   Constitutional: Negative for chills, diaphoresis, fever, malaise/fatigue and weight loss.   HENT: Negative for congestion, ear discharge, ear pain, hearing loss, nosebleeds, sinus pain, sore throat and tinnitus.    Eyes: Negative for blurred vision, double vision, photophobia, pain, discharge and redness.   Respiratory: Positive for cough, sputum production and shortness of breath. Negative for hemoptysis, wheezing and stridor.    Cardiovascular: Positive for chest pain. Negative for palpitations, orthopnea, claudication, leg swelling and PND.   Gastrointestinal: Negative for abdominal pain, constipation, diarrhea, heartburn, nausea and vomiting.   Genitourinary: Negative for dysuria and urgency.   Musculoskeletal: Positive for joint pain and neck pain. Negative for back pain, falls and myalgias.   Skin: Negative for itching and rash.   Neurological: Negative for  "dizziness, tremors, speech change, focal weakness, weakness and headaches.   Endo/Heme/Allergies: Negative for environmental allergies.   Psychiatric/Behavioral: Negative for depression.       /70 (BP Location: Left arm, Patient Position: Sitting, BP Cuff Size: Adult)   Pulse 93   Resp 16   Ht 1.702 m (5' 7\")   Wt 68 kg (150 lb)   SpO2 94%   Physical Exam  Constitutional:       General: She is not in acute distress.     Appearance: Normal appearance. She is well-developed and normal weight.   HENT:      Head: Normocephalic and atraumatic.      Right Ear: External ear normal.      Left Ear: External ear normal.      Nose: Nose normal.      Mouth/Throat:      Mouth: Mucous membranes are moist.      Pharynx: Oropharynx is clear. No oropharyngeal exudate.   Eyes:      General: No scleral icterus.     Extraocular Movements: Extraocular movements intact.      Conjunctiva/sclera: Conjunctivae normal.      Pupils: Pupils are equal, round, and reactive to light.   Neck:      Musculoskeletal: Normal range of motion and neck supple.      Vascular: No JVD.      Trachea: No tracheal deviation.   Cardiovascular:      Rate and Rhythm: Normal rate and regular rhythm.      Heart sounds: Normal heart sounds. No murmur. No friction rub. No gallop.    Pulmonary:      Effort: Pulmonary effort is normal. No accessory muscle usage or respiratory distress.      Breath sounds: Normal breath sounds. No wheezing or rales.   Abdominal:      General: There is no distension.      Palpations: Abdomen is soft.      Tenderness: There is no abdominal tenderness.   Musculoskeletal: Normal range of motion.         General: No tenderness or deformity.      Right lower leg: No edema.      Left lower leg: No edema.   Lymphadenopathy:      Cervical: No cervical adenopathy.   Skin:     General: Skin is warm and dry.      Findings: No rash.      Nails: There is no clubbing.     Neurological:      Mental Status: She is alert and oriented to " person, place, and time.      Cranial Nerves: No cranial nerve deficit.      Gait: Gait normal.   Psychiatric:         Mood and Affect: Mood normal.         Behavior: Behavior normal.         PFTs as reviewed by me personally: as per HPI    Imaging as reviewed by me personally:  As per HPI    Assessment:  1. Chest wall pain  PULMONARY FUNCTION TESTS -Test requested: Cardiopulmonary Exercise Testing (CPX)   2. Seasonal allergies     3. Moderate persistent asthma without complication         Plan:  1.  This is nonspecific and not entirely clear whether it is fully musculoskeletal although CT chest is reassuring.  Certainly some chest tightness due to bronchoconstriction could cause discomfort although deep breathing related to exertion could also cause discomfort is strictly musculoskeletal.  We discussed ordering a cardiopulmonary exercise test both to rule out underlying cardiopulmonary processes but also to see if her symptoms are brought on with the reduction in FEV1 with exercise.  Otherwise, we will continue Advair, short acting bronchodilators and treatment for allergies.  2.  Patient is followed by ENT and getting allergy therapy with sublingual drops.  She does feel that her respiratory symptoms worsen somewhat after drops and is considering discussing whether a decreased dose would be beneficial.  Encouraged her to follow-up with her providers.  3.  No history of asthma attacks.  Could be brought on by her respiratory tract infection and poorly controlled allergies.  As per above I encouraged her to follow-up with ENT in the meantime we will continue Advair and short acting bronchodilators.  I okayed her to use the albuterol prior to exercise.  Cardiopulmonary exercise test as per above.    Return in about 3 months (around 12/25/2020) for cpet.

## 2020-10-01 ENCOUNTER — OFFICE VISIT (OUTPATIENT)
Dept: MEDICAL GROUP | Age: 50
End: 2020-10-01
Payer: COMMERCIAL

## 2020-10-01 VITALS
DIASTOLIC BLOOD PRESSURE: 88 MMHG | OXYGEN SATURATION: 96 % | HEIGHT: 67 IN | TEMPERATURE: 97.3 F | SYSTOLIC BLOOD PRESSURE: 146 MMHG | HEART RATE: 85 BPM | WEIGHT: 156 LBS | BODY MASS INDEX: 24.48 KG/M2

## 2020-10-01 DIAGNOSIS — E55.9 VITAMIN D DEFICIENCY: ICD-10-CM

## 2020-10-01 DIAGNOSIS — M54.12 CERVICAL RADICULOPATHY AT C6: ICD-10-CM

## 2020-10-01 DIAGNOSIS — R73.01 IFG (IMPAIRED FASTING GLUCOSE): ICD-10-CM

## 2020-10-01 DIAGNOSIS — J45.30 MILD PERSISTENT ASTHMA WITHOUT COMPLICATION: ICD-10-CM

## 2020-10-01 DIAGNOSIS — R68.89 EXCESSIVE SODIUM INTAKE: ICD-10-CM

## 2020-10-01 DIAGNOSIS — I10 WHITE COAT SYNDROME WITH DIAGNOSIS OF HYPERTENSION: ICD-10-CM

## 2020-10-01 DIAGNOSIS — Z91.89 AT HIGH RISK FOR BREAST CANCER: ICD-10-CM

## 2020-10-01 DIAGNOSIS — I10 ESSENTIAL HYPERTENSION: ICD-10-CM

## 2020-10-01 DIAGNOSIS — E78.5 DYSLIPIDEMIA: ICD-10-CM

## 2020-10-01 DIAGNOSIS — F51.01 PRIMARY INSOMNIA: ICD-10-CM

## 2020-10-01 PROBLEM — M62.830 MUSCLE SPASM OF BACK: Status: RESOLVED | Noted: 2020-06-15 | Resolved: 2020-10-01

## 2020-10-01 PROCEDURE — 99214 OFFICE O/P EST MOD 30 MIN: CPT | Performed by: INTERNAL MEDICINE

## 2020-10-01 ASSESSMENT — ENCOUNTER SYMPTOMS
MUSCULOSKELETAL NEGATIVE: 1
CONSTITUTIONAL NEGATIVE: 1
PSYCHIATRIC NEGATIVE: 1
CARDIOVASCULAR NEGATIVE: 1
GASTROINTESTINAL NEGATIVE: 1
RESPIRATORY NEGATIVE: 1
EYES NEGATIVE: 1
NEUROLOGICAL NEGATIVE: 1

## 2020-10-01 ASSESSMENT — FIBROSIS 4 INDEX: FIB4 SCORE: 0.66

## 2020-10-01 NOTE — PROGRESS NOTES
Subjective:      Odalys Ballard is a 49 y.o. female who presents with Follow-Up (labs and blood pressure)  The patient is here for followup of chronic medical problems listed below. The patient is compliant with medications and having no side effects from them. Denies chest pain, abdominal pain, dyspnea, myalgias, or cough.   Patient Active Problem List    Diagnosis Date Noted   • Essential hypertension 10/01/2020     Priority: High   • Dyslipidemia 10/01/2020     Priority: High   • Mild persistent asthma without complication 06/15/2020     Priority: Medium   • White coat syndrome with diagnosis of hypertension 06/15/2020     Priority: Medium   • Excessive sodium intake 10/01/2020   • Cervical radiculopathy at C6 10/01/2020   • At high risk for breast cancer- prophylactic bilateral mastectomy TBD November 2020 10/01/2020   • IFG (impaired fasting glucose) 10/01/2020   • Vitamin D deficiency 10/01/2020   • Primary insomnia 10/01/2020   • Family history of breast cancer gene mutation in first degree relative 06/15/2020   • Migraine without aura and with status migrainosus, not intractable 06/15/2020   And the patient is here for evaluation of left shoulder pain was seen by neurosurgery and found to have significant C6 nerve root compression on MRI.  Surgery was contemplated but not planned at this point and trial of conservative management has been proposed and she is undergoing at this time.  This will explain the persistent left-sided chest pain and shoulder pain she explains over the last year.  She will continue with physical therapy stretching heat and rest.    Allergies   Allergen Reactions   • Lisinopril Cough   • Nkda [No Known Drug Allergy]      Outpatient Medications Prior to Visit   Medication Sig Dispense Refill   • Eszopiclone (LUNESTA) 3 MG Tab Take 1 Tab by mouth every evening for 90 days. 90 Tab 1   • baclofen (LIORESAL) 10 MG Tab Take 1 Tab by mouth 1 time daily as needed. 90 Tab 1   • losartan  (COZAAR) 25 MG Tab Take 1 Tab by mouth every day. 90 Tab 1   • Azelastine HCl 0.15 % Solution      • rizatriptan (MAXALT) 10 MG tablet Take 1 Tab by mouth 1 time daily as needed for Migraine. 12 Tab 4   • albuterol 108 (90 Base) MCG/ACT Aero Soln inhalation aerosol Inhale 2 Puffs by mouth every 6 hours as needed for Shortness of Breath. 8.5 g 11     No facility-administered medications prior to visit.      Lab Results   Component Value Date/Time    HBA1C 5.4 09/14/2020 04:46 AM     Lab Results   Component Value Date/Time    SODIUM 142 09/14/2020 04:46 AM    SODIUM 138 10/10/2016 03:42 PM    POTASSIUM 4.7 09/14/2020 04:46 AM    POTASSIUM 3.7 10/10/2016 03:42 PM    CHLORIDE 106 09/14/2020 04:46 AM    CHLORIDE 105 10/10/2016 03:42 PM    CO2 24 09/14/2020 04:46 AM    CO2 23 10/10/2016 03:42 PM    GLUCOSE 94 09/14/2020 04:46 AM    GLUCOSE 90 10/10/2016 03:42 PM    BUN 25 (H) 09/14/2020 04:46 AM    BUN 21 10/10/2016 03:42 PM    CREATININE 0.95 09/14/2020 04:46 AM    CREATININE 1.03 10/10/2016 03:42 PM    BUNCREATRAT 26 (H) 09/14/2020 04:46 AM    ALKPHOSPHAT 56 09/14/2020 04:46 AM    ASTSGOT 24 09/14/2020 04:46 AM    ALTSGPT 41 (H) 09/14/2020 04:46 AM    TBILIRUBIN 0.4 09/14/2020 04:46 AM     No results found for: INR  Lab Results   Component Value Date/Time    CHOLSTRLTOT 221 (H) 06/19/2020 06:34 AM     (H) 06/19/2020 06:34 AM    HDL 87 06/19/2020 06:34 AM    TRIGLYCERIDE 65 06/19/2020 06:34 AM       No results found for: TESTOSTERONE  Lab Results   Component Value Date/Time    TSH 1.490 06/19/2020 06:34 AM     No results found for: FREET4  No results found for: URICACID  No components found for: VITB12  Lab Results   Component Value Date/Time    25HYDROXY 56.0 06/19/2020 06:34 AM     Orders Only on 09/14/2020   Component Date Value   • Glucose 09/14/2020 94    • Bun 09/14/2020 25*   • Creatinine 09/14/2020 0.95    • GFR If Non  [facility]i* 09/14/2020 72    • GFR If  09/14/2020 82    •  "Bun-Creatinine Ratio 09/14/2020 26*   • Sodium 09/14/2020 142    • Potassium 09/14/2020 4.7    • Chloride 09/14/2020 106    • Co2 09/14/2020 24    • Calcium 09/14/2020 9.9    • Total Protein 09/14/2020 6.8    • Albumin 09/14/2020 4.9*   • Globulin 09/14/2020 1.9    • A-G Ratio 09/14/2020 2.6*   • Total Bilirubin 09/14/2020 0.4    • Alkaline Phosphatase 09/14/2020 56    • AST(SGOT) 09/14/2020 24    • ALT(SGPT) 09/14/2020 41*   • Glycohemoglobin 09/14/2020 5.4                   HPI    Review of Systems   Constitutional: Negative.    HENT: Negative.    Eyes: Negative.    Respiratory: Negative.    Cardiovascular: Negative.    Gastrointestinal: Negative.    Genitourinary: Negative.    Musculoskeletal: Negative.    Skin: Negative.    Neurological: Negative.    Endo/Heme/Allergies: Negative.    Psychiatric/Behavioral: Negative.           Objective:     /88   Pulse 85   Temp 36.3 °C (97.3 °F) (Temporal)   Ht 1.702 m (5' 7\")   Wt 70.8 kg (156 lb)   LMP 10/08/2016   SpO2 96%   BMI 24.43 kg/m²      Physical Exam  Vitals signs reviewed.   Constitutional:       General: She is not in acute distress.     Appearance: She is well-developed. She is not diaphoretic.   HENT:      Head: Normocephalic and atraumatic.      Right Ear: External ear normal.      Left Ear: External ear normal.      Nose: Nose normal.      Mouth/Throat:      Pharynx: No oropharyngeal exudate.   Eyes:      General: No scleral icterus.        Right eye: No discharge.         Left eye: No discharge.      Conjunctiva/sclera: Conjunctivae normal.      Pupils: Pupils are equal, round, and reactive to light.   Neck:      Musculoskeletal: Normal range of motion and neck supple.      Thyroid: No thyromegaly.      Vascular: No JVD.      Trachea: No tracheal deviation.   Cardiovascular:      Rate and Rhythm: Normal rate and regular rhythm.      Heart sounds: Normal heart sounds. No murmur. No friction rub. No gallop.    Pulmonary:      Effort: Pulmonary " effort is normal. No respiratory distress.      Breath sounds: Normal breath sounds. No stridor. No wheezing or rales.   Chest:      Chest wall: No tenderness.   Abdominal:      General: Bowel sounds are normal. There is no distension.      Palpations: Abdomen is soft. There is no mass.      Tenderness: There is no abdominal tenderness. There is no guarding or rebound.   Musculoskeletal: Normal range of motion.         General: No tenderness.   Lymphadenopathy:      Cervical: No cervical adenopathy.   Skin:     General: Skin is warm and dry.      Coloration: Skin is not pale.      Findings: No erythema or rash.   Neurological:      Mental Status: She is alert and oriented to person, place, and time.      Cranial Nerves: No cranial nerve deficit.      Motor: No abnormal muscle tone.      Coordination: Coordination normal.      Deep Tendon Reflexes: Reflexes are normal and symmetric. Reflexes normal.   Psychiatric:         Behavior: Behavior normal.         Thought Content: Thought content normal.         Judgment: Judgment normal.                 Assessment/Plan:        1. Essential hypertension-not at goal.  Patient consumes excessive amount of salt and advised on low-sodium diet which should help bring her blood pressure down least 10-15 points.  She will use of salt salt substitute.  If no improvement will consider increasing losartan to 50 mg daily.     - TSH; Future  - Comp Metabolic Panel; Future  - Lipid Profile; Future  - CBC WITH DIFFERENTIAL; Future    2. Excessive sodium intake-as above        3. IFG (impaired fasting glucose)-much better with change in diet and exercise program.  Now normal.     - HEMOGLOBIN A1C; Future    4. Vitamin D deficiency-good control.  Continue supplements.      - VITAMIN D,25 HYDROXY; Future    5. Mild persistent asthma without complication  Under good control.  Continue inhalers as prescribed.  Follow-up with [facility].    6. Dyslipidemia-under good control with diet and exercise.   Continue same regimen.  Recheck labs.     - TSH; Future  - Comp Metabolic Panel; Future  - Lipid Profile; Future  - CBC WITH DIFFERENTIAL; Future    7. Cervical radiculopathy at C6-new problem-recently found on MRI.  Continue management as per her physical medicine doctors and neurosurgeons.       8. At high risk for breast cancer- prophylactic bilateral mastectomy TBD November 2020.  Patient's had extensive consultation with medical geneticist regarding her high risk and there is family members for breast cancer.  She is decided on prophylactic bilateral mastectomy which will be done in November of this year in conjunction with plastic surgery and her general surgeon.  Has noticed no breast lumps or breast pain recently.         9. White coat syndrome with diagnosis of hypertension  -This will be taken account 1 week follow-up on blood pressure at next visit.  She will keep a record of her outside readings which have been well controlled less than 140 consistently systolic.    10. Primary insomnia  Under good control continue Lunesta as needed.

## 2020-10-03 ENCOUNTER — OFFICE VISIT (OUTPATIENT)
Dept: URGENT CARE | Facility: CLINIC | Age: 50
End: 2020-10-03
Payer: COMMERCIAL

## 2020-10-03 ENCOUNTER — HOSPITAL ENCOUNTER (OUTPATIENT)
Facility: MEDICAL CENTER | Age: 50
End: 2020-10-03
Attending: NURSE PRACTITIONER
Payer: COMMERCIAL

## 2020-10-03 VITALS
OXYGEN SATURATION: 96 % | TEMPERATURE: 97.5 F | WEIGHT: 159 LBS | RESPIRATION RATE: 16 BRPM | BODY MASS INDEX: 24.96 KG/M2 | HEIGHT: 67 IN | HEART RATE: 99 BPM | DIASTOLIC BLOOD PRESSURE: 86 MMHG | SYSTOLIC BLOOD PRESSURE: 136 MMHG

## 2020-10-03 DIAGNOSIS — R11.0 NAUSEA: ICD-10-CM

## 2020-10-03 DIAGNOSIS — J02.9 SORE THROAT: ICD-10-CM

## 2020-10-03 DIAGNOSIS — R19.7 DIARRHEA, UNSPECIFIED TYPE: ICD-10-CM

## 2020-10-03 DIAGNOSIS — B37.0 ORAL THRUSH: ICD-10-CM

## 2020-10-03 LAB
INT CON NEG: NORMAL
INT CON POS: NORMAL
S PYO AG THROAT QL: NEGATIVE

## 2020-10-03 PROCEDURE — U0003 INFECTIOUS AGENT DETECTION BY NUCLEIC ACID (DNA OR RNA); SEVERE ACUTE RESPIRATORY SYNDROME CORONAVIRUS 2 (SARS-COV-2) (CORONAVIRUS DISEASE [COVID-19]), AMPLIFIED PROBE TECHNIQUE, MAKING USE OF HIGH THROUGHPUT TECHNOLOGIES AS DESCRIBED BY CMS-2020-01-R: HCPCS

## 2020-10-03 PROCEDURE — 99214 OFFICE O/P EST MOD 30 MIN: CPT | Performed by: NURSE PRACTITIONER

## 2020-10-03 PROCEDURE — 87880 STREP A ASSAY W/OPTIC: CPT | Performed by: NURSE PRACTITIONER

## 2020-10-03 ASSESSMENT — ENCOUNTER SYMPTOMS
SORE THROAT: 1
ABDOMINAL PAIN: 0
WEAKNESS: 0
NECK PAIN: 0
SHORTNESS OF BREATH: 0
MYALGIAS: 0
FEVER: 0
EYE REDNESS: 0
HEADACHES: 0
CONSTIPATION: 0
COUGH: 0
DIARRHEA: 1
FLANK PAIN: 0
CHILLS: 0
PALPITATIONS: 0
VOMITING: 0
NAUSEA: 1
EYE DISCHARGE: 0
WHEEZING: 0
DIZZINESS: 0
ORTHOPNEA: 0

## 2020-10-03 ASSESSMENT — FIBROSIS 4 INDEX: FIB4 SCORE: 0.66

## 2020-10-03 NOTE — PATIENT INSTRUCTIONS
INSTRUCTIONS FOR COVID-19 OR ANY OTHER INFECTIOUS RESPIRATORY ILLNESSES    The Centers for Disease Control and Prevention (CDC) states that early indications for COVID-19 include cough, shortness of breath, difficulty breathing, or at least two of the following symptoms: chills, shaking with chills, muscle pain, headache, sore throat, and loss of taste or smell. Symptoms can range from mild to severe and may appear up to two weeks after exposure to the virus.    The practice of self-isolation and quarantine helps protect the public and your family by  preventing exposure to people who have or may have a contagious disease. Please follow the prevention steps below as based on CDC guidelines:    WHEN TO STOP ISOLATION: Persons with COVID-19 or any other infectious respiratory illness who have symptoms and were advised to care for themselves at home may discontinue home isolation under the following conditions:  · At least 24 hours have passed since recovery defined as resolution of fever without the use of fever-reducing medications; AND,  · Improvement in respiratory symptoms (e.g., cough, shortness of breath); AND,  · At least 10 days have passed since symptoms first appeared and have had no subsequent illness.    MONITOR YOUR SYMPTOMS: If your illness is worsening, seek prompt medical attention. If you have a medical emergency and need to call 911, notify the dispatch personnel that you have, or are being evaluated for confirmed or suspected COVID-19 or another infectious respiratory illness. Wear a facemask if possible.    ACTIVITY RESTRICTION: restrict activities outside your home, except for getting medical care. Do not go to work, school, or public areas. Avoid using public transportation, ride-sharing, or taxis.    SCHEDULED MEDICAL APPOINTMENTS: Notify your provider that you have, or are being evaluated for, confirmed or suspected COVID-19 or another infectious respiratory. This will help the healthcare  provider’s office safely take care of you and keep other people from getting exposed or infected.    FACEMASKS, when to wear: Anytime you are away from your home or around other people or pets. If you are unable to wear one, maintain a minimum of 6 feet distancing from others.    LIVING ENVIRONMENT: Stay in a separate room from other people and pets. If possible, use a separate bathroom, have someone else care for your pets and avoid sharing household items. Any items used should be washed thoroughly with soap and water. Clean all “high-touch” surfaces every day. Use a household cleaning spray or wipe, according to the label instructions. High touch surfaces include (but are not limited to) counters, tabletops, doorknobs, bathroom fixtures, toilets, phones, keyboards, tablets, and bedside tables.     HAND WASHING: Frequently wash hands with soap and water for at least 20 seconds,  especially after blowing your nose, coughing, or sneezing; going to the bathroom; before and after interacting with pets; and before and after eating or preparing food. If hands are visibly dirty use soap and water. If soap and water are not available, use an alcohol-based hand  with at least 60% alcohol. Avoid touching your eyes, nose, and mouth with unwashed hands. Cover your coughs and sneezes with a tissue. Throw used tissues in a lined trash can. Immediately wash your hands.    ACTIVE/FACILITATED SELF-MONITORING: Follow instructions provided by your local health department or health professionals, as appropriate. When working with your local health department check their available hours.    [facility]   Phone Number   [facility] [phone]   [facility] [place] [phone]   [place] Call [address]   [place] [phone]     IF YOU HAVE CONFIRMED POSITIVE COVID-19:    Those who have completely recovered from COVID-19 may have immune-boosting antibodies in their plasma--called “convalescent plasma”--that could be  used to treat critically ill COVID19 patients.    [facility] is excited to begin working with [employer] on collecting convalescent plasma from  people who have recovered from COVID-19 as part of a program to treat patients infected with the virus. This FDA-approved “emergency investigational new drug” is a special blood product containing antibodies that may give patients an extra boost to fight the virus.    To be eligible to donate convalescent plasma, you must have a prior COVID-19 diagnosis documented by a laboratory test (or a positive test result for SARS-CoV-2 antibodies) and meet additional eligibility requirements.    If you are interested in donating convalescent plasma or have any additional questions, please contact the [facility] Convalescent Plasma  at [phone] or via e-mail at [email or web address].  Oral Thrush, Adult    Oral thrush, also called oral candidiasis, is a fungal infection that develops in the mouth and throat and on the tongue. It causes white patches to form on the mouth and tongue. Thrush is most common in older adults, but it can occur at any age.  Many cases of thrush are mild, but this infection can also be serious. Thrush can be a repeated (recurrent) problem for certain people who have a weak body defense system (immune system). The weakness can be caused by chronic illnesses, or by taking medicines that limit the body's ability to fight infection. If a person has difficulty fighting infection, the fungus that causes thrush can spread through the body. This can cause life-threatening blood or organ infections.  What are the causes?  This condition is caused by a fungus (yeast) called Candida albicans.  · This fungus is normally present in small amounts in the mouth and on other mucous membranes. It usually causes no harm.  · If conditions are present that allow the fungus to grow without control, it invades surrounding tissues and becomes an  infection.  · Other Candida species can also lead to thrush (rare).  What increases the risk?  This condition is more likely to develop in:  · People with a weakened immune system.  · Older adults.  · People with HIV (human immunodeficiency virus).  · People with diabetes.  · People with dry mouth (xerostomia).  · Pregnant women.  · People with poor dental care, especially people who have false teeth.  · People who use antibiotic medicines.  What are the signs or symptoms?  Symptoms of this condition can vary from mild and moderate to severe and persistent. Symptoms may include:  · A burning feeling in the mouth and throat. This can occur at the start of a thrush infection.  · White patches that stick to the mouth and tongue. The tissue around the patches may be red, raw, and painful. If rubbed (during tooth brushing, for example), the patches and the tissue of the mouth may bleed easily.  · A bad taste in the mouth or difficulty tasting foods.  · A cottony feeling in the mouth.  · Pain during eating and swallowing.  · Poor appetite.  · Cracking at the corners of the mouth.  How is this diagnosed?  This condition is diagnosed based on:  · Physical exam. Your health care provider will look in your mouth.  · Health history. Your health care provider will ask you questions about your health.  How is this treated?  This condition is treated with medicines called antifungals, which prevent the growth of fungi. These medicines are either applied directly to the affected area (topical) or swallowed (oral). The treatment will depend on the severity of the condition.  Mild thrush  Mild cases of thrush may clear up with the use of an antifungal mouth rinse or lozenges. Treatment usually lasts about 14 days.  Moderate to severe thrush  · More severe thrush infections that have spread to the esophagus are treated with an oral antifungal medicine. A topical antifungal medicine may also be used.  · For some severe infections,  treatment may need to continue for more than 14 days.  · Oral antifungal medicines are rarely used during pregnancy because they may be harmful to the unborn child. If you are pregnant, talk with your health care provider about options for treatment.  Persistent or recurrent thrush  For cases of thrush that do not go away or keep coming back:  · Treatment may be needed twice as long as the symptoms last.  · Treatment will include both oral and topical antifungal medicines.  · People with a weakened immune system can take an antifungal medicine on a continuous basis to prevent thrush infections.  It is important to treat conditions that make a person more likely to get thrush, such as diabetes or HIV.  Follow these instructions at home:  Medicines  · Take over-the-counter and prescription medicines only as told by your health care provider.  · Talk with your health care provider about an over-the-counter medicine called gentian violet, which kills bacteria and fungi.  Relieving soreness and discomfort  To help reduce the discomfort of thrush:  · Drink cold liquids such as water or iced tea.  · Try flavored ice treats or frozen juices.  · Eat foods that are easy to swallow, such as gelatin, ice cream, or custard.  · Try drinking from a straw if the patches in your mouth are painful.    General instructions  · Eat plain, unflavored yogurt as directed by your health care provider. Check the label to make sure the yogurt contains live cultures. This yogurt can help healthy bacteria to grow in the mouth and can stop the growth of the fungus that causes thrush.  · If you wear dentures, remove the dentures before going to bed, brush them vigorously, and soak them in a cleaning solution as directed by your health care provider.  · Rinse your mouth with a warm salt-water mixture several times a day. To make a salt-water mixture, completely dissolve 1/2-1 tsp of salt in 1 cup of warm water.  Contact a health care provider  if:  · Your symptoms are getting worse or are not improving within 7 days of starting treatment.  · You have symptoms of a spreading infection, such as white patches on the skin outside of the mouth.  This information is not intended to replace advice given to you by your health care provider. Make sure you discuss any questions you have with your health care provider.  Document Released: 09/12/2005 Document Revised: 03/22/2019 Document Reviewed: 09/11/2017  [employer] Patient Education © 2020 [employer]

## 2020-10-04 DIAGNOSIS — R11.0 NAUSEA: ICD-10-CM

## 2020-10-04 DIAGNOSIS — R19.7 DIARRHEA, UNSPECIFIED TYPE: ICD-10-CM

## 2020-10-04 DIAGNOSIS — J02.9 SORE THROAT: ICD-10-CM

## 2020-10-04 LAB — COVID ORDER STATUS COVID19: NORMAL

## 2020-10-04 NOTE — PROGRESS NOTES
Subjective:      Odalys Ballard is a 49 y.o. female who presents with Thrush (possible oral thrush, tongue is all white, bother her throat started yesterday)            HPI  C/o white film on tongue after inhaler use of Advair and Albuterol. Using spacer. Mild sore throat. Denies fever, ear pain, cough or SOB. States nausea and diarrhea started as well x 3 days. Fatigue. No known exposure to COVID.    PMH:  has a past medical history of ASTHMA, Bronchitis (2015), Heart burn, Hiatus hernia syndrome, Other specified symptom associated with female genital organs, and Pneumonia (2010). She also has no past medical history of Allergy.  MEDS:   Current Outpatient Medications:   •  nystatin (MYCOSTATIN) 199985 UNIT/ML Suspension, Take 5 mL by mouth 4 times a day for 7 days. Gargle and spit out after holding in mouth as long as possible before spitting out., Disp: 140 mL, Rfl: 0  •  Eszopiclone (LUNESTA) 3 MG Tab, Take 1 Tab by mouth every evening for 90 days., Disp: 90 Tab, Rfl: 1  •  baclofen (LIORESAL) 10 MG Tab, Take 1 Tab by mouth 1 time daily as needed., Disp: 90 Tab, Rfl: 1  •  losartan (COZAAR) 25 MG Tab, Take 1 Tab by mouth every day., Disp: 90 Tab, Rfl: 1  •  Azelastine HCl 0.15 % Solution, , Disp: , Rfl:   •  rizatriptan (MAXALT) 10 MG tablet, Take 1 Tab by mouth 1 time daily as needed for Migraine., Disp: 12 Tab, Rfl: 4  •  albuterol 108 (90 Base) MCG/ACT Aero Soln inhalation aerosol, Inhale 2 Puffs by mouth every 6 hours as needed for Shortness of Breath., Disp: 8.5 g, Rfl: 11  ALLERGIES:   Allergies   Allergen Reactions   • Lisinopril Cough   • Nkda [No Known Drug Allergy]      SURGHX:   Past Surgical History:   Procedure Laterality Date   • HYSTERECTOMY ROBOTIC N/A 10/17/2016    Procedure: HYSTERECTOMY ROBOTIC, BILATERAL SALPINGECTOMY;  Surgeon: Yamilet Trammell M.D.;  Location: SURGERY [facility];  Service:    • CYSTOSCOPY N/A 10/17/2016    Procedure: CYSTOSCOPY;  Surgeon: Yamilet Trammell M.D.;   "Location: SURGERY [facility];  Service:    • PELVISCOPY  9/6/2013    Performed by Spencer Matthews M.D. at SURGERY [facility]   • CHOLECYSTECTOMY  2009    laparoscopic   • LAPAROSCOPY  2000, 2008     SOCHX:  reports that she has never smoked. She has never used smokeless tobacco. She reports current alcohol use. She reports that she does not use drugs.  FH: Family history was reviewed, no pertinent findings to report    Review of Systems   Constitutional: Positive for malaise/fatigue. Negative for chills and fever.   HENT: Positive for sore throat. Negative for congestion and ear pain.         White film on tongue.   Eyes: Negative for discharge and redness.   Respiratory: Negative for cough, shortness of breath and wheezing.    Cardiovascular: Negative for chest pain, palpitations and orthopnea.   Gastrointestinal: Positive for diarrhea and nausea. Negative for abdominal pain, constipation and vomiting.   Genitourinary: Negative for dysuria, flank pain, frequency, hematuria and urgency.   Musculoskeletal: Negative for myalgias and neck pain.   Skin: Negative for itching and rash.   Neurological: Negative for dizziness, weakness and headaches.   Endo/Heme/Allergies: Negative for environmental allergies.   All other systems reviewed and are negative.         Objective:     /86   Pulse 99   Temp 36.4 °C (97.5 °F)   Resp 16   Ht 1.702 m (5' 7\")   Wt 72.1 kg (159 lb)   LMP 10/08/2016   SpO2 96%   BMI 24.90 kg/m²      Physical Exam  Vitals signs reviewed.   Constitutional:       General: She is awake. She is not in acute distress.     Appearance: Normal appearance. She is well-developed. She is not ill-appearing, toxic-appearing or diaphoretic.   HENT:      Head: Normocephalic.      Nose: Nose normal.      Mouth/Throat:      Mouth: Mucous membranes are dry. No oral lesions or angioedema.      Pharynx: Uvula midline. Pharyngeal swelling and posterior oropharyngeal erythema present. No " oropharyngeal exudate or uvula swelling.      Tonsils: No tonsillar exudate or tonsillar abscesses.      Comments: Mild white film on tongue, general redness/swelling to oropharynx.  Eyes:      Conjunctiva/sclera: Conjunctivae normal.      Pupils: Pupils are equal, round, and reactive to light.   Neck:      Musculoskeletal: Normal range of motion and neck supple.   Cardiovascular:      Rate and Rhythm: Normal rate.   Pulmonary:      Effort: Pulmonary effort is normal. No tachypnea, accessory muscle usage or respiratory distress.   Abdominal:      General: Bowel sounds are normal. There is no distension.      Palpations: Abdomen is soft.      Tenderness: There is no abdominal tenderness. There is no guarding or rebound.   Musculoskeletal: Normal range of motion.   Skin:     General: Skin is warm and dry.   Neurological:      Mental Status: She is alert and oriented to person, place, and time.   Psychiatric:         Behavior: Behavior is cooperative.                 Assessment/Plan:        1. Oral thrush    - nystatin (MYCOSTATIN) 497274 UNIT/ML Suspension; Take 5 mL by mouth 4 times a day for 7 days. Gargle and spit out after holding in mouth as long as possible before spitting out.  Dispense: 140 mL; Refill: 0    2. Sore throat    - POCT Rapid Strep A: NEG  - COVID/SARS COV-2 PCR; Future    3. Diarrhea, unspecified type    - COVID/SARS COV-2 PCR; Future    4. Nausea    - COVID/SARS COV-2 PCR; Future    Clean spacer frequently  Change toothbrush  Gargle with salt water and then nystatin  Increase water intake  May use Tylenol prn for any fever or body aches  Get rest  Salt water gargle prn  Modify diet for diarrhea/bland-liquid diet prn  May use otc Immodium prn  Monitor for fevers, worse cough, SOB, CP, chest tightness, sinus problems- need re-evaluation  AVS summary printed with COVID info provided  Patient to sign up with [facility] for copy of test result or go through Medical Records    [facility] release for COVID  result, may take up to 48 hrs for result, patient notified

## 2020-10-05 LAB
SARS-COV-2 RNA RESP QL NAA+PROBE: NOTDETECTED
SPECIMEN SOURCE: NORMAL

## 2020-10-06 ENCOUNTER — OFFICE VISIT (OUTPATIENT)
Dept: URGENT CARE | Facility: CLINIC | Age: 50
End: 2020-10-06
Payer: COMMERCIAL

## 2020-10-06 ENCOUNTER — HOSPITAL ENCOUNTER (OUTPATIENT)
Facility: MEDICAL CENTER | Age: 50
End: 2020-10-06
Attending: PHYSICIAN ASSISTANT
Payer: COMMERCIAL

## 2020-10-06 VITALS
HEIGHT: 67 IN | RESPIRATION RATE: 14 BRPM | OXYGEN SATURATION: 95 % | TEMPERATURE: 97.6 F | DIASTOLIC BLOOD PRESSURE: 82 MMHG | WEIGHT: 154 LBS | BODY MASS INDEX: 24.17 KG/M2 | SYSTOLIC BLOOD PRESSURE: 126 MMHG | HEART RATE: 86 BPM

## 2020-10-06 DIAGNOSIS — B37.0 ORAL THRUSH: ICD-10-CM

## 2020-10-06 DIAGNOSIS — J02.9 PHARYNGITIS, UNSPECIFIED ETIOLOGY: ICD-10-CM

## 2020-10-06 PROCEDURE — 87070 CULTURE OTHR SPECIMN AEROBIC: CPT

## 2020-10-06 PROCEDURE — 99214 OFFICE O/P EST MOD 30 MIN: CPT | Performed by: PHYSICIAN ASSISTANT

## 2020-10-06 ASSESSMENT — ENCOUNTER SYMPTOMS
EYES NEGATIVE: 1
SHORTNESS OF BREATH: 0
CHILLS: 0
COUGH: 1
VOMITING: 0
NAUSEA: 0
SORE THROAT: 1
ABDOMINAL PAIN: 0
FEVER: 0
WHEEZING: 0

## 2020-10-06 ASSESSMENT — FIBROSIS 4 INDEX: FIB4 SCORE: 0.66

## 2020-10-06 NOTE — PROGRESS NOTES
Subjective:   Odalys Ballard is a 49 y.o. female who presents for Thrush      HPI         Improvement with Nystatin liquid.     Sore throat worse. Right side. Worse with swallowing.   Denies any trouble swallowing.     ROS    Medications:    • albuterol Aers  • Azelastine HCl Soln  • baclofen Tabs  • Eszopiclone Tabs  • losartan Tabs  • nystatin Susp  • rizatriptan    Allergies: Lisinopril and Nkda [no known drug allergy]    Problem List: Odalys Ballard has Mild persistent asthma without complication; Family history of breast cancer gene mutation in first degree relative; White coat syndrome with diagnosis of hypertension; Migraine without aura and with status migrainosus, not intractable; Excessive sodium intake; Cervical radiculopathy at C6; At high risk for breast cancer- prophylactic bilateral mastectomy TBD November 2020; Essential hypertension; IFG (impaired fasting glucose); Vitamin D deficiency; Dyslipidemia; and Primary insomnia on their problem list.    Surgical History:  Past Surgical History:   Procedure Laterality Date   • HYSTERECTOMY ROBOTIC N/A 10/17/2016    Procedure: HYSTERECTOMY ROBOTIC, BILATERAL SALPINGECTOMY;  Surgeon: Yamilet Trammell M.D.;  Location: SURGERY [facility];  Service:    • CYSTOSCOPY N/A 10/17/2016    Procedure: CYSTOSCOPY;  Surgeon: Yamilet Trammell M.D.;  Location: [facility];  Service:    • PELVISCOPY  9/6/2013    Performed by Spencer Matthews M.D. at [facility]   • CHOLECYSTECTOMY  2009    laparoscopic   • LAPAROSCOPY  2000, 2008       Past Social Hx: Odalys Ballard  reports that she has never smoked. She has never used smokeless tobacco. She reports current alcohol use. She reports that she does not use drugs.     Past Family Hx:  Odalys Ballard family history includes Cancer in an other family member; Diabetes in an other family member; Heart Disease in an other family member; Hypertension in an other family member.     Problem list,  "medications, and allergies reviewed by myself today in Epic.     Objective:     /82 (BP Location: Left arm, Patient Position: Sitting)   Pulse 86   Temp 36.4 °C (97.6 °F)   Resp 14   Ht 1.702 m (5' 7\")   Wt 69.9 kg (154 lb)   LMP 10/08/2016   SpO2 95%   BMI 24.12 kg/m²     Physical Exam    Assessment/Plan:     Diagnosis and associated orders:     No diagnosis found.   Comments/MDM:     •            Red flags discussed and indications to immediately call 911 or present to the Emergency Department.   Supportive care, differential diagnoses, and indications for immediate follow-up discussed with patient.    Pathogenesis of diagnosis discussed including typical length and natural progression. Patient expresses understanding and agrees to plan.    Advised the patient to follow-up with the primary care physician for recheck, reevaluation, and consideration of further management.    Please note that this dictation was created using voice recognition software. I have made a reasonable attempt to correct obvious errors, but I expect that there are errors of grammar and possibly content that I did not discover before finalizing the note.    This note was electronically signed by Alex Torres PA-C  "

## 2020-10-06 NOTE — PROGRESS NOTES
Subjective:   Odalys Ballard is a 49 y.o. female who presents for Thrush      HPI  Patient is a 49-year-old female who presents with oral thrush for approximately 4 days.  She was seen here in the clinic 3 days ago and placed on nystatin suspension swish and swallow.  She states she has been compliant with her nystatin and notes improvement of her oral thrush.  However, she reports worsening sore throat located to the right side.  She was tested negative for strep A in the clinic 3 days ago and also negative for COVID-19.  Her sore throat is moderate and worse with swallowing.  She denies any trouble swallowing foods or liquids.  Reports of mild intermittent cough.  Mild nasal congestion runny nose.  Denies any fevers, chills, chest pain, shortness of breath, abdominal pain, nausea vomiting.  Reports history of asthma and uses an inhaler.  She believes she obtained her thrush from an inhaler.  Denies history of diabetes.      Review of Systems   Constitutional: Negative for chills and fever.   HENT: Positive for sore throat. Negative for ear pain.         Oral thrush   Eyes: Negative.    Respiratory: Positive for cough. Negative for shortness of breath and wheezing.    Cardiovascular: Negative for chest pain.   Gastrointestinal: Negative for abdominal pain, nausea and vomiting.   Skin: Negative.        Medications:    • albuterol Aers  • Azelastine HCl Soln  • baclofen Tabs  • Eszopiclone Tabs  • losartan Tabs  • nystatin Susp  • rizatriptan    Allergies: Lisinopril and Nkda [no known drug allergy]    Problem List: Odalys Ballard has Mild persistent asthma without complication; Family history of breast cancer gene mutation in first degree relative; White coat syndrome with diagnosis of hypertension; Migraine without aura and with status migrainosus, not intractable; Excessive sodium intake; Cervical radiculopathy at C6; At high risk for breast cancer- prophylactic bilateral mastectomy TBD November 2020; Essential  "hypertension; IFG (impaired fasting glucose); Vitamin D deficiency; Dyslipidemia; and Primary insomnia on their problem list.    Surgical History:  Past Surgical History:   Procedure Laterality Date   • HYSTERECTOMY ROBOTIC N/A 10/17/2016    Procedure: HYSTERECTOMY ROBOTIC, BILATERAL SALPINGECTOMY;  Surgeon: Yamilet Trammell M.D.;  Location: SURGERY [facility];  Service:    • CYSTOSCOPY N/A 10/17/2016    Procedure: CYSTOSCOPY;  Surgeon: Yamilet Trammell M.D.;  Location: SURGERY [facility];  Service:    • PELVISCOPY  9/6/2013    Performed by Spencer Matthews M.D. at [facility]   • CHOLECYSTECTOMY  2009    laparoscopic   • LAPAROSCOPY  2000, 2008       Past Social Hx: Odalys Ballard  reports that she has never smoked. She has never used smokeless tobacco. She reports current alcohol use. She reports that she does not use drugs.     Past Family Hx:  Odalys Ballard family history includes Cancer in an other family member; Diabetes in an other family member; Heart Disease in an other family member; Hypertension in an other family member.     Problem list, medications, and allergies reviewed by myself today in Epic.     Objective:     /82 (BP Location: Left arm, Patient Position: Sitting)   Pulse 86   Temp 36.4 °C (97.6 °F)   Resp 14   Ht 1.702 m (5' 7\")   Wt 69.9 kg (154 lb)   LMP 10/08/2016   SpO2 95%   BMI 24.12 kg/m²     Physical Exam  Vitals signs reviewed.   Constitutional:       General: She is not in acute distress.     Appearance: Normal appearance. She is not ill-appearing or toxic-appearing.   HENT:      Mouth/Throat:      Lips: Pink.      Mouth: Mucous membranes are moist. No oral lesions.      Palate: No lesions.      Pharynx: Oropharynx is clear. Uvula midline. Posterior oropharyngeal erythema present. No pharyngeal swelling, oropharyngeal exudate or uvula swelling.      Tonsils: No tonsillar exudate.      Comments: Possible very mild white film to tongue.  Eyes: "      Conjunctiva/sclera: Conjunctivae normal.      Pupils: Pupils are equal, round, and reactive to light.   Neck:      Musculoskeletal: Neck supple. No neck rigidity or pain with movement.      Trachea: Trachea normal.      Comments: Neck: Negative mass, edema, erythema.  Cardiovascular:      Rate and Rhythm: Normal rate and regular rhythm.      Heart sounds: Normal heart sounds.   Pulmonary:      Effort: Pulmonary effort is normal. No respiratory distress.      Breath sounds: Normal breath sounds. No wheezing, rhonchi or rales.   Lymphadenopathy:      Cervical: Cervical adenopathy present.      Right cervical: Superficial cervical adenopathy present.      Left cervical: Superficial cervical adenopathy present.   Skin:     General: Skin is warm and dry.   Neurological:      General: No focal deficit present.      Mental Status: She is alert and oriented to person, place, and time.   Psychiatric:         Mood and Affect: Mood normal.         Behavior: Behavior normal.         Assessment/Plan:     Diagnosis and associated orders:     1. Pharyngitis, unspecified etiology  CULTURE THROAT   2. Oral thrush        Comments/MDM:     • Improvement of oral thrush.   • Continue nystatin oral liquid swish and spit.   • Warm salt water gargles, ibuprofen or Tylenol for pain, soft foods, cool liquids.   • Throat culture: Will call back for results and appropriate therapeutic changes if needed.     Discussed her sore throat may be due to oral thrush versus viral pharyngitis.     Overall, the patient is very well-appearing, normal vital signs, no signs of respiratory distress.  Suspicions for emergent pathology, pharyngeal abscess, or other serious pathology are low.       Red flags discussed and indications to immediately call 911 or present to the Emergency Department.   Supportive care, differential diagnoses, and indications for immediate follow-up discussed with patient.    Pathogenesis of diagnosis discussed including  typical length and natural progression. Patient expresses understanding and agrees to plan.    Advised the patient to follow-up with the primary care physician for recheck, reevaluation, and consideration of further management.    Please note that this dictation was created using voice recognition software. I have made a reasonable attempt to correct obvious errors, but I expect that there are errors of grammar and possibly content that I did not discover before finalizing the note.    This note was electronically signed by Alex Torres PA-C

## 2020-10-07 DIAGNOSIS — J02.9 PHARYNGITIS, UNSPECIFIED ETIOLOGY: ICD-10-CM

## 2020-10-09 LAB
BACTERIA SPEC RESP CULT: NORMAL
SIGNIFICANT IND 70042: NORMAL
SITE SITE: NORMAL
SOURCE SOURCE: NORMAL

## 2020-10-26 ENCOUNTER — APPOINTMENT (OUTPATIENT)
Dept: PULMONOLOGY | Facility: MEDICAL CENTER | Age: 50
End: 2020-10-26
Payer: COMMERCIAL

## 2020-11-02 ENCOUNTER — PRE-ADMISSION TESTING (OUTPATIENT)
Dept: ADMISSIONS | Facility: MEDICAL CENTER | Age: 50
End: 2020-11-02
Attending: SURGERY
Payer: COMMERCIAL

## 2020-11-02 DIAGNOSIS — Z01.812 PRE-OPERATIVE LABORATORY EXAMINATION: ICD-10-CM

## 2020-11-02 LAB
ANION GAP SERPL CALC-SCNC: 11 MMOL/L (ref 7–16)
BASOPHILS # BLD AUTO: 0.7 % (ref 0–1.8)
BASOPHILS # BLD: 0.04 K/UL (ref 0–0.12)
BUN SERPL-MCNC: 21 MG/DL (ref 8–22)
CALCIUM SERPL-MCNC: 9.9 MG/DL (ref 8.5–10.5)
CHLORIDE SERPL-SCNC: 104 MMOL/L (ref 96–112)
CO2 SERPL-SCNC: 26 MMOL/L (ref 20–33)
COVID ORDER STATUS COVID19: NORMAL
CREAT SERPL-MCNC: 0.87 MG/DL (ref 0.5–1.4)
EOSINOPHIL # BLD AUTO: 0.07 K/UL (ref 0–0.51)
EOSINOPHIL NFR BLD: 1.3 % (ref 0–6.9)
ERYTHROCYTE [DISTWIDTH] IN BLOOD BY AUTOMATED COUNT: 39.9 FL (ref 35.9–50)
GLUCOSE SERPL-MCNC: 106 MG/DL (ref 65–99)
HCT VFR BLD AUTO: 45.2 % (ref 37–47)
HGB BLD-MCNC: 15.1 G/DL (ref 12–16)
IMM GRANULOCYTES # BLD AUTO: 0.03 K/UL (ref 0–0.11)
IMM GRANULOCYTES NFR BLD AUTO: 0.5 % (ref 0–0.9)
LYMPHOCYTES # BLD AUTO: 1.82 K/UL (ref 1–4.8)
LYMPHOCYTES NFR BLD: 33.2 % (ref 22–41)
MCH RBC QN AUTO: 32.1 PG (ref 27–33)
MCHC RBC AUTO-ENTMCNC: 33.4 G/DL (ref 33.6–35)
MCV RBC AUTO: 96 FL (ref 81.4–97.8)
MONOCYTES # BLD AUTO: 0.54 K/UL (ref 0–0.85)
MONOCYTES NFR BLD AUTO: 9.9 % (ref 0–13.4)
NEUTROPHILS # BLD AUTO: 2.98 K/UL (ref 2–7.15)
NEUTROPHILS NFR BLD: 54.4 % (ref 44–72)
NRBC # BLD AUTO: 0 K/UL
NRBC BLD-RTO: 0 /100 WBC
PLATELET # BLD AUTO: 250 K/UL (ref 164–446)
PMV BLD AUTO: 10.1 FL (ref 9–12.9)
POTASSIUM SERPL-SCNC: 4.4 MMOL/L (ref 3.6–5.5)
RBC # BLD AUTO: 4.71 M/UL (ref 4.2–5.4)
SARS-COV-2 RNA RESP QL NAA+PROBE: NOTDETECTED
SODIUM SERPL-SCNC: 141 MMOL/L (ref 135–145)
SPECIMEN SOURCE: NORMAL
WBC # BLD AUTO: 5.5 K/UL (ref 4.8–10.8)

## 2020-11-02 PROCEDURE — 80048 BASIC METABOLIC PNL TOTAL CA: CPT

## 2020-11-02 PROCEDURE — 36415 COLL VENOUS BLD VENIPUNCTURE: CPT

## 2020-11-02 PROCEDURE — U0003 INFECTIOUS AGENT DETECTION BY NUCLEIC ACID (DNA OR RNA); SEVERE ACUTE RESPIRATORY SYNDROME CORONAVIRUS 2 (SARS-COV-2) (CORONAVIRUS DISEASE [COVID-19]), AMPLIFIED PROBE TECHNIQUE, MAKING USE OF HIGH THROUGHPUT TECHNOLOGIES AS DESCRIBED BY CMS-2020-01-R: HCPCS

## 2020-11-02 PROCEDURE — 85025 COMPLETE CBC W/AUTO DIFF WBC: CPT

## 2020-11-02 ASSESSMENT — FIBROSIS 4 INDEX: FIB4 SCORE: 0.68

## 2020-11-05 ENCOUNTER — HOSPITAL ENCOUNTER (OUTPATIENT)
Facility: MEDICAL CENTER | Age: 50
End: 2020-11-06
Attending: SURGERY | Admitting: SURGERY
Payer: COMMERCIAL

## 2020-11-05 ENCOUNTER — ANESTHESIA (OUTPATIENT)
Dept: SURGERY | Facility: MEDICAL CENTER | Age: 50
End: 2020-11-05
Payer: COMMERCIAL

## 2020-11-05 ENCOUNTER — ANESTHESIA EVENT (OUTPATIENT)
Dept: SURGERY | Facility: MEDICAL CENTER | Age: 50
End: 2020-11-05
Payer: COMMERCIAL

## 2020-11-05 PROCEDURE — 700101 HCHG RX REV CODE 250: Performed by: SURGERY

## 2020-11-05 PROCEDURE — 700111 HCHG RX REV CODE 636 W/ 250 OVERRIDE (IP): Performed by: ANESTHESIOLOGY

## 2020-11-05 PROCEDURE — 500371 HCHG DRAIN, BLAKE 10MM: Performed by: SURGERY

## 2020-11-05 PROCEDURE — 160041 HCHG SURGERY MINUTES - EA ADDL 1 MIN LEVEL 4: Performed by: SURGERY

## 2020-11-05 PROCEDURE — 700102 HCHG RX REV CODE 250 W/ 637 OVERRIDE(OP): Performed by: PLASTIC SURGERY

## 2020-11-05 PROCEDURE — 160035 HCHG PACU - 1ST 60 MINS PHASE I: Performed by: SURGERY

## 2020-11-05 PROCEDURE — 160009 HCHG ANES TIME/MIN: Performed by: SURGERY

## 2020-11-05 PROCEDURE — 88307 TISSUE EXAM BY PATHOLOGIST: CPT

## 2020-11-05 PROCEDURE — 160002 HCHG RECOVERY MINUTES (STAT): Performed by: SURGERY

## 2020-11-05 PROCEDURE — A9270 NON-COVERED ITEM OR SERVICE: HCPCS | Performed by: PLASTIC SURGERY

## 2020-11-05 PROCEDURE — G0378 HOSPITAL OBSERVATION PER HR: HCPCS

## 2020-11-05 PROCEDURE — 96365 THER/PROPH/DIAG IV INF INIT: CPT

## 2020-11-05 PROCEDURE — 501838 HCHG SUTURE GENERAL: Performed by: SURGERY

## 2020-11-05 PROCEDURE — 700105 HCHG RX REV CODE 258: Performed by: SURGERY

## 2020-11-05 PROCEDURE — A9270 NON-COVERED ITEM OR SERVICE: HCPCS | Performed by: ANESTHESIOLOGY

## 2020-11-05 PROCEDURE — 502000 HCHG MISC OR IMPLANTS RC 0278: Performed by: SURGERY

## 2020-11-05 PROCEDURE — 96375 TX/PRO/DX INJ NEW DRUG ADDON: CPT

## 2020-11-05 PROCEDURE — 160029 HCHG SURGERY MINUTES - 1ST 30 MINS LEVEL 4: Performed by: SURGERY

## 2020-11-05 PROCEDURE — 160036 HCHG PACU - EA ADDL 30 MINS PHASE I: Performed by: SURGERY

## 2020-11-05 PROCEDURE — 700111 HCHG RX REV CODE 636 W/ 250 OVERRIDE (IP): Performed by: SURGERY

## 2020-11-05 PROCEDURE — 160048 HCHG OR STATISTICAL LEVEL 1-5: Performed by: SURGERY

## 2020-11-05 PROCEDURE — 500054 HCHG BANDAGE, ELASTIC 6: Performed by: SURGERY

## 2020-11-05 PROCEDURE — 700111 HCHG RX REV CODE 636 W/ 250 OVERRIDE (IP): Performed by: PLASTIC SURGERY

## 2020-11-05 PROCEDURE — 88342 IMHCHEM/IMCYTCHM 1ST ANTB: CPT

## 2020-11-05 PROCEDURE — 700101 HCHG RX REV CODE 250: Performed by: ANESTHESIOLOGY

## 2020-11-05 PROCEDURE — A9270 NON-COVERED ITEM OR SERVICE: HCPCS | Performed by: SURGERY

## 2020-11-05 PROCEDURE — 700101 HCHG RX REV CODE 250: Performed by: PLASTIC SURGERY

## 2020-11-05 PROCEDURE — 700102 HCHG RX REV CODE 250 W/ 637 OVERRIDE(OP): Performed by: ANESTHESIOLOGY

## 2020-11-05 PROCEDURE — 700102 HCHG RX REV CODE 250 W/ 637 OVERRIDE(OP): Performed by: SURGERY

## 2020-11-05 DEVICE — TISSUE MATRIX ALLODERM PERFORATED SINGLE READY TO USE MEDIUM 1.5MM-1.8MM: Type: IMPLANTABLE DEVICE | Site: BREAST | Status: FUNCTIONAL

## 2020-11-05 DEVICE — IMPLANTABLE DEVICE: Type: IMPLANTABLE DEVICE | Site: BREAST | Status: FUNCTIONAL

## 2020-11-05 RX ORDER — ONDANSETRON 2 MG/ML
4 INJECTION INTRAMUSCULAR; INTRAVENOUS
Status: COMPLETED | OUTPATIENT
Start: 2020-11-05 | End: 2020-11-05

## 2020-11-05 RX ORDER — ALBUTEROL SULFATE 90 UG/1
2 AEROSOL, METERED RESPIRATORY (INHALATION) EVERY 6 HOURS PRN
Status: DISCONTINUED | OUTPATIENT
Start: 2020-11-05 | End: 2020-11-06 | Stop reason: HOSPADM

## 2020-11-05 RX ORDER — SUMATRIPTAN 50 MG/1
100 TABLET, FILM COATED ORAL
Status: DISCONTINUED | OUTPATIENT
Start: 2020-11-05 | End: 2020-11-06 | Stop reason: HOSPADM

## 2020-11-05 RX ORDER — GENTAMICIN SULFATE 40 MG/ML
INJECTION, SOLUTION INTRAMUSCULAR; INTRAVENOUS
Status: DISCONTINUED | OUTPATIENT
Start: 2020-11-05 | End: 2020-11-05 | Stop reason: HOSPADM

## 2020-11-05 RX ORDER — OXYCODONE HYDROCHLORIDE 5 MG/1
2.5 TABLET ORAL
Status: DISCONTINUED | OUTPATIENT
Start: 2020-11-05 | End: 2020-11-06 | Stop reason: HOSPADM

## 2020-11-05 RX ORDER — MIDAZOLAM HYDROCHLORIDE 1 MG/ML
1 INJECTION INTRAMUSCULAR; INTRAVENOUS
Status: DISCONTINUED | OUTPATIENT
Start: 2020-11-05 | End: 2020-11-05 | Stop reason: HOSPADM

## 2020-11-05 RX ORDER — CEFAZOLIN SODIUM 2 G/100ML
2 INJECTION, SOLUTION INTRAVENOUS EVERY 8 HOURS
Status: COMPLETED | OUTPATIENT
Start: 2020-11-05 | End: 2020-11-06

## 2020-11-05 RX ORDER — CALCIUM CARBONATE 500 MG/1
500 TABLET, CHEWABLE ORAL
Status: DISCONTINUED | OUTPATIENT
Start: 2020-11-05 | End: 2020-11-06 | Stop reason: HOSPADM

## 2020-11-05 RX ORDER — ACETAMINOPHEN 325 MG/1
650 TABLET ORAL EVERY 6 HOURS
Status: DISCONTINUED | OUTPATIENT
Start: 2020-11-05 | End: 2020-11-06 | Stop reason: HOSPADM

## 2020-11-05 RX ORDER — ACETAMINOPHEN 500 MG
1000 TABLET ORAL ONCE
Status: COMPLETED | OUTPATIENT
Start: 2020-11-05 | End: 2020-11-05

## 2020-11-05 RX ORDER — BACLOFEN 10 MG/1
10 TABLET ORAL
Status: DISCONTINUED | OUTPATIENT
Start: 2020-11-05 | End: 2020-11-06 | Stop reason: HOSPADM

## 2020-11-05 RX ORDER — SODIUM CHLORIDE, SODIUM LACTATE, POTASSIUM CHLORIDE, CALCIUM CHLORIDE 600; 310; 30; 20 MG/100ML; MG/100ML; MG/100ML; MG/100ML
INJECTION, SOLUTION INTRAVENOUS CONTINUOUS
Status: DISCONTINUED | OUTPATIENT
Start: 2020-11-05 | End: 2020-11-05 | Stop reason: HOSPADM

## 2020-11-05 RX ORDER — DIPHENHYDRAMINE HCL 25 MG
25 TABLET ORAL EVERY 6 HOURS PRN
Status: DISCONTINUED | OUTPATIENT
Start: 2020-11-05 | End: 2020-11-06 | Stop reason: HOSPADM

## 2020-11-05 RX ORDER — DEXTROSE, SODIUM CHLORIDE, SODIUM LACTATE, POTASSIUM CHLORIDE, AND CALCIUM CHLORIDE 5; .6; .31; .03; .02 G/100ML; G/100ML; G/100ML; G/100ML; G/100ML
INJECTION, SOLUTION INTRAVENOUS CONTINUOUS
Status: ACTIVE | OUTPATIENT
Start: 2020-11-05 | End: 2020-11-06

## 2020-11-05 RX ORDER — ONDANSETRON 2 MG/ML
4 INJECTION INTRAMUSCULAR; INTRAVENOUS EVERY 4 HOURS PRN
Status: DISCONTINUED | OUTPATIENT
Start: 2020-11-05 | End: 2020-11-06 | Stop reason: HOSPADM

## 2020-11-05 RX ORDER — HALOPERIDOL 5 MG/ML
1 INJECTION INTRAMUSCULAR
Status: DISCONTINUED | OUTPATIENT
Start: 2020-11-05 | End: 2020-11-05 | Stop reason: HOSPADM

## 2020-11-05 RX ORDER — GABAPENTIN 300 MG/1
300 CAPSULE ORAL ONCE
Status: COMPLETED | OUTPATIENT
Start: 2020-11-05 | End: 2020-11-05

## 2020-11-05 RX ORDER — HYDROMORPHONE HYDROCHLORIDE 1 MG/ML
0.1 INJECTION, SOLUTION INTRAMUSCULAR; INTRAVENOUS; SUBCUTANEOUS
Status: DISCONTINUED | OUTPATIENT
Start: 2020-11-05 | End: 2020-11-05 | Stop reason: HOSPADM

## 2020-11-05 RX ORDER — MEPERIDINE HYDROCHLORIDE 25 MG/ML
12.5 INJECTION INTRAMUSCULAR; INTRAVENOUS; SUBCUTANEOUS
Status: DISCONTINUED | OUTPATIENT
Start: 2020-11-05 | End: 2020-11-05 | Stop reason: HOSPADM

## 2020-11-05 RX ORDER — RIZATRIPTAN BENZOATE 10 MG/1
10 TABLET ORAL
Status: DISCONTINUED | OUTPATIENT
Start: 2020-11-05 | End: 2020-11-05

## 2020-11-05 RX ORDER — DIPHENHYDRAMINE HYDROCHLORIDE 50 MG/ML
12.5 INJECTION INTRAMUSCULAR; INTRAVENOUS
Status: DISCONTINUED | OUTPATIENT
Start: 2020-11-05 | End: 2020-11-05 | Stop reason: HOSPADM

## 2020-11-05 RX ORDER — OXYCODONE HCL 5 MG/5 ML
5 SOLUTION, ORAL ORAL
Status: DISCONTINUED | OUTPATIENT
Start: 2020-11-05 | End: 2020-11-05 | Stop reason: HOSPADM

## 2020-11-05 RX ORDER — DEXAMETHASONE SODIUM PHOSPHATE 4 MG/ML
INJECTION, SOLUTION INTRA-ARTICULAR; INTRALESIONAL; INTRAMUSCULAR; INTRAVENOUS; SOFT TISSUE PRN
Status: DISCONTINUED | OUTPATIENT
Start: 2020-11-05 | End: 2020-11-09 | Stop reason: SURG

## 2020-11-05 RX ORDER — OXYCODONE HCL 10 MG/1
10 TABLET, FILM COATED, EXTENDED RELEASE ORAL ONCE
Status: COMPLETED | OUTPATIENT
Start: 2020-11-05 | End: 2020-11-05

## 2020-11-05 RX ORDER — MAGNESIUM HYDROXIDE 1200 MG/15ML
LIQUID ORAL
Status: COMPLETED | OUTPATIENT
Start: 2020-11-05 | End: 2020-11-05

## 2020-11-05 RX ORDER — HYDROMORPHONE HYDROCHLORIDE 1 MG/ML
0.5 INJECTION, SOLUTION INTRAMUSCULAR; INTRAVENOUS; SUBCUTANEOUS EVERY 4 HOURS PRN
Status: DISCONTINUED | OUTPATIENT
Start: 2020-11-05 | End: 2020-11-06 | Stop reason: HOSPADM

## 2020-11-05 RX ORDER — DIPHENHYDRAMINE HYDROCHLORIDE 50 MG/ML
25 INJECTION INTRAMUSCULAR; INTRAVENOUS EVERY 6 HOURS PRN
Status: DISCONTINUED | OUTPATIENT
Start: 2020-11-05 | End: 2020-11-06 | Stop reason: HOSPADM

## 2020-11-05 RX ORDER — HALOPERIDOL 5 MG/ML
1 INJECTION INTRAMUSCULAR EVERY 6 HOURS PRN
Status: DISCONTINUED | OUTPATIENT
Start: 2020-11-05 | End: 2020-11-06 | Stop reason: HOSPADM

## 2020-11-05 RX ORDER — TRAZODONE HYDROCHLORIDE 50 MG/1
50 TABLET ORAL NIGHTLY PRN
Status: DISCONTINUED | OUTPATIENT
Start: 2020-11-05 | End: 2020-11-06 | Stop reason: HOSPADM

## 2020-11-05 RX ORDER — LIDOCAINE HYDROCHLORIDE 20 MG/ML
INJECTION, SOLUTION EPIDURAL; INFILTRATION; INTRACAUDAL; PERINEURAL PRN
Status: DISCONTINUED | OUTPATIENT
Start: 2020-11-05 | End: 2020-11-09 | Stop reason: SURG

## 2020-11-05 RX ORDER — HYDROMORPHONE HYDROCHLORIDE 1 MG/ML
0.2 INJECTION, SOLUTION INTRAMUSCULAR; INTRAVENOUS; SUBCUTANEOUS
Status: DISCONTINUED | OUTPATIENT
Start: 2020-11-05 | End: 2020-11-05 | Stop reason: HOSPADM

## 2020-11-05 RX ORDER — SODIUM CHLORIDE, SODIUM LACTATE, POTASSIUM CHLORIDE, CALCIUM CHLORIDE 600; 310; 30; 20 MG/100ML; MG/100ML; MG/100ML; MG/100ML
INJECTION, SOLUTION INTRAVENOUS CONTINUOUS
Status: ACTIVE | OUTPATIENT
Start: 2020-11-05 | End: 2020-11-06

## 2020-11-05 RX ORDER — CEFAZOLIN SODIUM 1 G/3ML
INJECTION, POWDER, FOR SOLUTION INTRAMUSCULAR; INTRAVENOUS PRN
Status: DISCONTINUED | OUTPATIENT
Start: 2020-11-05 | End: 2020-11-09 | Stop reason: SURG

## 2020-11-05 RX ORDER — OXYCODONE HCL 5 MG/5 ML
10 SOLUTION, ORAL ORAL
Status: DISCONTINUED | OUTPATIENT
Start: 2020-11-05 | End: 2020-11-05 | Stop reason: HOSPADM

## 2020-11-05 RX ORDER — ONDANSETRON 2 MG/ML
INJECTION INTRAMUSCULAR; INTRAVENOUS PRN
Status: DISCONTINUED | OUTPATIENT
Start: 2020-11-05 | End: 2020-11-09 | Stop reason: SURG

## 2020-11-05 RX ORDER — BUPIVACAINE HYDROCHLORIDE 2.5 MG/ML
INJECTION, SOLUTION EPIDURAL; INFILTRATION; INTRACAUDAL
Status: DISCONTINUED | OUTPATIENT
Start: 2020-11-05 | End: 2020-11-05 | Stop reason: HOSPADM

## 2020-11-05 RX ORDER — DEXAMETHASONE SODIUM PHOSPHATE 4 MG/ML
4 INJECTION, SOLUTION INTRA-ARTICULAR; INTRALESIONAL; INTRAMUSCULAR; INTRAVENOUS; SOFT TISSUE
Status: DISCONTINUED | OUTPATIENT
Start: 2020-11-05 | End: 2020-11-06 | Stop reason: HOSPADM

## 2020-11-05 RX ORDER — SCOLOPAMINE TRANSDERMAL SYSTEM 1 MG/1
1 PATCH, EXTENDED RELEASE TRANSDERMAL
Status: DISCONTINUED | OUTPATIENT
Start: 2020-11-05 | End: 2020-11-06 | Stop reason: HOSPADM

## 2020-11-05 RX ORDER — LOSARTAN POTASSIUM 25 MG/1
25 TABLET ORAL DAILY
Status: DISCONTINUED | OUTPATIENT
Start: 2020-11-06 | End: 2020-11-06 | Stop reason: HOSPADM

## 2020-11-05 RX ORDER — HYDROMORPHONE HYDROCHLORIDE 1 MG/ML
0.4 INJECTION, SOLUTION INTRAMUSCULAR; INTRAVENOUS; SUBCUTANEOUS
Status: DISCONTINUED | OUTPATIENT
Start: 2020-11-05 | End: 2020-11-05 | Stop reason: HOSPADM

## 2020-11-05 RX ORDER — CEFAZOLIN SODIUM 1 G/3ML
INJECTION, POWDER, FOR SOLUTION INTRAMUSCULAR; INTRAVENOUS
Status: DISCONTINUED | OUTPATIENT
Start: 2020-11-05 | End: 2020-11-05 | Stop reason: HOSPADM

## 2020-11-05 RX ADMIN — CEFAZOLIN 2 G: 330 INJECTION, POWDER, FOR SOLUTION INTRAMUSCULAR; INTRAVENOUS at 14:59

## 2020-11-05 RX ADMIN — LIDOCAINE HYDROCHLORIDE 50 MG: 20 INJECTION, SOLUTION EPIDURAL; INFILTRATION; INTRACAUDAL at 14:54

## 2020-11-05 RX ADMIN — ONDANSETRON 4 MG: 2 INJECTION INTRAMUSCULAR; INTRAVENOUS at 16:41

## 2020-11-05 RX ADMIN — DEXAMETHASONE SODIUM PHOSPHATE 4 MG: 4 INJECTION, SOLUTION INTRA-ARTICULAR; INTRALESIONAL; INTRAMUSCULAR; INTRAVENOUS; SOFT TISSUE at 15:24

## 2020-11-05 RX ADMIN — EPHEDRINE SULFATE 10 MG: 50 INJECTION, SOLUTION INTRAVENOUS at 15:48

## 2020-11-05 RX ADMIN — PROPOFOL 180 MG: 10 INJECTION, EMULSION INTRAVENOUS at 14:54

## 2020-11-05 RX ADMIN — OXYCODONE HYDROCHLORIDE 10 MG: 10 TABLET, FILM COATED, EXTENDED RELEASE ORAL at 14:45

## 2020-11-05 RX ADMIN — ACETAMINOPHEN 1000 MG: 500 TABLET ORAL at 14:45

## 2020-11-05 RX ADMIN — OXYCODONE 2.5 MG: 5 TABLET ORAL at 23:58

## 2020-11-05 RX ADMIN — FENTANYL CITRATE 50 MCG: 50 INJECTION, SOLUTION INTRAMUSCULAR; INTRAVENOUS at 16:09

## 2020-11-05 RX ADMIN — CEFAZOLIN SODIUM 2 G: 2 INJECTION, SOLUTION INTRAVENOUS at 23:24

## 2020-11-05 RX ADMIN — ACETAMINOPHEN 650 MG: 325 TABLET, FILM COATED ORAL at 21:37

## 2020-11-05 RX ADMIN — POVIDONE IODINE 15 ML: 100 SOLUTION TOPICAL at 14:00

## 2020-11-05 RX ADMIN — ONDANSETRON 4 MG: 2 INJECTION INTRAMUSCULAR; INTRAVENOUS at 23:58

## 2020-11-05 RX ADMIN — ONDANSETRON 4 MG: 2 INJECTION INTRAMUSCULAR; INTRAVENOUS at 18:18

## 2020-11-05 RX ADMIN — SODIUM CHLORIDE, POTASSIUM CHLORIDE, SODIUM LACTATE AND CALCIUM CHLORIDE: 600; 310; 30; 20 INJECTION, SOLUTION INTRAVENOUS at 14:01

## 2020-11-05 RX ADMIN — GABAPENTIN 300 MG: 300 CAPSULE ORAL at 14:45

## 2020-11-05 RX ADMIN — FENTANYL CITRATE 100 MCG: 50 INJECTION, SOLUTION INTRAMUSCULAR; INTRAVENOUS at 14:52

## 2020-11-05 ASSESSMENT — LIFESTYLE VARIABLES
HAVE YOU EVER FELT YOU SHOULD CUT DOWN ON YOUR DRINKING: NO
TOTAL SCORE: 0
ON A TYPICAL DAY WHEN YOU DRINK ALCOHOL HOW MANY DRINKS DO YOU HAVE: 1
HAVE PEOPLE ANNOYED YOU BY CRITICIZING YOUR DRINKING: NO
AVERAGE NUMBER OF DAYS PER WEEK YOU HAVE A DRINK CONTAINING ALCOHOL: 2
TOTAL SCORE: 0
EVER HAD A DRINK FIRST THING IN THE MORNING TO STEADY YOUR NERVES TO GET RID OF A HANGOVER: NO
CONSUMPTION TOTAL: NEGATIVE
TOTAL SCORE: 0
EVER FELT BAD OR GUILTY ABOUT YOUR DRINKING: NO
DOES PATIENT WANT TO STOP DRINKING: NO
ALCOHOL_USE: YES
HOW MANY TIMES IN THE PAST YEAR HAVE YOU HAD 5 OR MORE DRINKS IN A DAY: 0

## 2020-11-05 ASSESSMENT — PATIENT HEALTH QUESTIONNAIRE - PHQ9
2. FEELING DOWN, DEPRESSED, IRRITABLE, OR HOPELESS: NOT AT ALL
1. LITTLE INTEREST OR PLEASURE IN DOING THINGS: NOT AT ALL
SUM OF ALL RESPONSES TO PHQ9 QUESTIONS 1 AND 2: 0

## 2020-11-05 ASSESSMENT — PAIN DESCRIPTION - PAIN TYPE
TYPE: SURGICAL PAIN
TYPE: ACUTE PAIN

## 2020-11-05 ASSESSMENT — FIBROSIS 4 INDEX: FIB4 SCORE: 0.72

## 2020-11-05 NOTE — ANESTHESIA PREPROCEDURE EVALUATION
Relevant Problems   PULMONARY   (+) Mild persistent asthma without complication      CARDIAC   (+) Essential hypertension   (+) Migraine without aura and with status migrainosus, not intractable   (+) White coat syndrome with diagnosis of hypertension       Physical Exam    Airway   Mallampati: II  TM distance: >3 FB  Neck ROM: full       Cardiovascular - normal exam  Rhythm: regular  Rate: normal  (-) murmur     Dental - normal exam           Pulmonary - normal exam  Breath sounds clear to auscultation     Abdominal    Neurological - normal exam                 Anesthesia Plan    ASA 2       Plan - general       Airway plan will be LMA        Induction: intravenous    Postoperative Plan: Postoperative administration of opioids is intended.    Pertinent diagnostic labs and testing reviewed    Informed Consent:    Anesthetic plan and risks discussed with patient.    Use of blood products discussed with: patient whom consented to blood products.

## 2020-11-05 NOTE — ANESTHESIA PROCEDURE NOTES
Airway    Date/Time: 11/5/2020 2:55 PM  Performed by: César Giang M.D.  Authorized by: César Giang M.D.     Location:  OR  Urgency:  Elective  Difficult Airway: No    Indications for Airway Management:  Anesthesia      Spontaneous Ventilation: absent    Sedation Level:  Deep  Preoxygenated: Yes    Final Airway Type:  Supraglottic airway  Final Supraglottic Airway:  Standard LMA    SGA Size:  3  Number of Attempts at Approach:  1  Number of Other Approaches Attempted:  0

## 2020-11-06 VITALS
OXYGEN SATURATION: 98 % | HEART RATE: 61 BPM | RESPIRATION RATE: 18 BRPM | SYSTOLIC BLOOD PRESSURE: 122 MMHG | DIASTOLIC BLOOD PRESSURE: 79 MMHG | BODY MASS INDEX: 23.86 KG/M2 | WEIGHT: 157.41 LBS | TEMPERATURE: 97.9 F | HEIGHT: 68 IN

## 2020-11-06 LAB — PATHOLOGY CONSULT NOTE: NORMAL

## 2020-11-06 PROCEDURE — A9270 NON-COVERED ITEM OR SERVICE: HCPCS | Performed by: PLASTIC SURGERY

## 2020-11-06 PROCEDURE — 700111 HCHG RX REV CODE 636 W/ 250 OVERRIDE (IP): Performed by: PLASTIC SURGERY

## 2020-11-06 PROCEDURE — G0378 HOSPITAL OBSERVATION PER HR: HCPCS

## 2020-11-06 PROCEDURE — 700102 HCHG RX REV CODE 250 W/ 637 OVERRIDE(OP): Performed by: PLASTIC SURGERY

## 2020-11-06 PROCEDURE — 96376 TX/PRO/DX INJ SAME DRUG ADON: CPT

## 2020-11-06 RX ADMIN — ACETAMINOPHEN 650 MG: 325 TABLET, FILM COATED ORAL at 11:07

## 2020-11-06 RX ADMIN — OXYCODONE 2.5 MG: 5 TABLET ORAL at 06:55

## 2020-11-06 RX ADMIN — ONDANSETRON 4 MG: 2 INJECTION INTRAMUSCULAR; INTRAVENOUS at 06:56

## 2020-11-06 RX ADMIN — LOSARTAN POTASSIUM 25 MG: 25 TABLET, FILM COATED ORAL at 06:51

## 2020-11-06 RX ADMIN — OXYCODONE 2.5 MG: 5 TABLET ORAL at 11:08

## 2020-11-06 RX ADMIN — ACETAMINOPHEN 650 MG: 325 TABLET, FILM COATED ORAL at 06:51

## 2020-11-06 RX ADMIN — ONDANSETRON 4 MG: 2 INJECTION INTRAMUSCULAR; INTRAVENOUS at 11:09

## 2020-11-06 RX ADMIN — CEFAZOLIN SODIUM 2 G: 2 INJECTION, SOLUTION INTRAVENOUS at 06:51

## 2020-11-06 ASSESSMENT — COGNITIVE AND FUNCTIONAL STATUS - GENERAL
SUGGESTED CMS G CODE MODIFIER MOBILITY: CH
SUGGESTED CMS G CODE MODIFIER DAILY ACTIVITY: CH
DAILY ACTIVITIY SCORE: 24
MOBILITY SCORE: 24

## 2020-11-06 ASSESSMENT — PAIN DESCRIPTION - PAIN TYPE: TYPE: ACUTE PAIN

## 2020-11-06 ASSESSMENT — ENCOUNTER SYMPTOMS: CHEST TIGHTNESS: 1

## 2020-11-06 NOTE — PROGRESS NOTES
2 RN Skin Check complete    Medical devices in place  - SCD sleeves, skin CDI underneath    Pt has surgical site to bilateral breasts with DELICIA drains on each side, dressing in place, CDI    All other skin inspected and intact

## 2020-11-06 NOTE — PROGRESS NOTES
Discharging Patient home per physician order.  Discharged with .  Demonstrated understanding of discharge instructions, follow up appointments, home medications, prescriptions, home care for surgical wound and nursing care instructions for DELICIA drains.  Pt able to demonstrate care of DELICIA drains.  Ambulating without assistance, voiding without difficulty, pain well controlled, tolerating oral medications, oxygen saturation greater than 90%, tolerating diet.  Educational handouts given and discussed.  Verbalized understanding of discharge instructions and educational handouts.  All questions answered.  Belongings with patient at time of discharge.

## 2020-11-06 NOTE — OR SURGEON
Immediate Post OP Note    PreOp Diagnosis: genetic susceptibility breast cancer    PostOp Diagnosis: same    Procedure(s):  MASTECTOMY  RECONSTRUCTION, BREAST  INSERTION OR REMOVAL, TISSUE EXPANDER- FOR PLACEMENT  AND USE OF ACELLULAR DERMAL MATRIX    Surgeon(s):  BETI Partida M.D.    Anesthesiologist/Type of Anesthesia:  Anesthesiologist: César Giang M.D./* No anesthesia type entered *    Surgical Staff:  Assistant: CANDIDO Rodriguez  Circulator: Lukas D. Gansert, R.N.  Relief Circulator: Benita Warner R.N.  Relief Scrub: Mack Waterman  Scrub Person: Basilio Peralta    Specimens removed if any:  ID Type Source Tests Collected by Time Destination   A : Right Breast - stitch marks axillary tail  Tissue Breast PATHOLOGY SPECIMEN Franny Felix M.D. 11/5/2020 1523    B : Left Breast - stitch marks axillary tail Tissue Breast PATHOLOGY SPECIMEN Franny Felix M.D. 11/5/2020 1540        Estimated Blood Loss: minimal    Findings: see operative note    Complications: no apparent  #[id number]        11/5/2020 4:44 PM Amos Dang M.D.

## 2020-11-06 NOTE — DISCHARGE INSTRUCTIONS
Discharge Instructions    Discharged to home by car with relative. Discharged via wheelchair, hospital escort: Yes.  Special equipment needed: Not Applicable    Be sure to schedule a follow-up appointment with your primary care doctor or any specialists as instructed.     Discharge Plan:   Diet Plan: Discussed  Activity Level: Discussed  Confirmed Follow up Appointment: Patient to Call and Schedule Appointment  Confirmed Symptoms Management: Discussed  Medication Reconciliation Updated: Yes  Influenza Vaccine Indication: Not indicated: Previously immunized this influenza season and > 8 years of age      ACTIVITY: Rest and take it easy for the first 24 hours.  A responsible adult is recommended to remain with you during that time.  It is normal to feel sleepy.  We encourage you to not do anything that requires balance, judgment or coordination.      Monitor for signs and symptoms of infection (fever, chills, nausea, vomiting)  Monitor incisions for swelling, redness, or excessive drainage  You may shower, no baths or soaks until incisions are healed  No heavy lifting, including pushing and pulling  Walking is encouraged    MILD FLU-LIKE SYMPTOMS ARE NORMAL. YOU MAY EXPERIENCE GENERALIZED MUSCLE ACHES, THROAT IRRITATION, HEADACHE AND/OR SOME NAUSEA.    FOR 24 HOURS DO NOT:  Drive, operate machinery or run household appliances.  Drink beer or alcoholic beverages.   Make important decisions or sign legal documents.      DIET: To avoid nausea, slowly advance diet as tolerated, avoiding spicy or greasy foods for the first day.  Add more substantial food to your diet according to your physician's instructions.  INCREASE FLUIDS AND FIBER TO AVOID CONSTIPATION.  I understand that a diet low in cholesterol, fat, and sodium is recommended for good health. Unless I have been given specific instructions below for another diet, I accept this instruction as my diet prescription.     You should CALL YOUR PHYSICIAN if you  develop:  Fever greater than 101 degrees F.  Pain not relieved by medication, or persistent nausea or vomiting.  Excessive bleeding (blood soaking through dressing) or unexpected drainage from the wound.  Extreme redness or swelling around the incision site, drainage of pus or foul smelling drainage.  Inability to urinate or empty your bladder within 8 hours.  Problems with breathing or chest pain.    You should call 911 if you develop problems with breathing or chest pain.  If you are unable to contact your doctor or surgical center, you should go to the nearest emergency room or urgent care center.   If any questions arise, call your doctor.  If your doctor is not available, please feel free to call the Surgical Center at [phone]. The Contact Center is open Monday through Friday 7AM to 5PM and may speak to a nurse at [phone], or toll free at [phone].     A registered nurse may call you a few days after your surgery to see how you are doing after your procedure.    MEDICATIONS: Resume taking daily medication.  Take prescribed pain medication with food.  If no medication is prescribed, you may take non-aspirin pain medication if needed.  PAIN MEDICATION CAN BE VERY CONSTIPATING.  Take a stool softener or laxative such as senokot, pericolace, or milk of magnesia if needed.    If your physician has prescribed pain medication that includes Acetaminophen (Tylenol), do not take additional Acetaminophen (Tylenol) while taking the prescribed medication.      Surgical Drain Home Care  Surgical drains are used to remove extra fluid that normally builds up in a surgical wound after surgery. A surgical drain helps to heal a surgical wound. Different kinds of surgical drains include:  · Active drains. These drains use suction to pull drainage away from the surgical wound. Drainage flows through a tube to a container outside of the body. With these drains, you need to keep the bulb or the drainage container  flat (compressed) at all times, except while you empty it. Flattening the bulb or container creates suction.  · Passive drains. These drains allow fluid to drain naturally, by gravity. Drainage flows through a tube to a bandage (dressing) or a container outside of the body. Passive drains do not need to be emptied.  A drain is placed during surgery. Right after surgery, drainage is usually bright red and a little thicker than water. The drainage may gradually turn yellow or pink and become thinner. It is likely that your health care provider will remove the drain when the drainage stops or when the amount decreases to 1-2 Tbsp (15-30 mL) during a 24-hour period.  Supplies needed:  · Tape.  · Germ-free cleaning solution (sterile saline).  · Cotton swabs.  · Split gauze drain sponge: 4 x 4 inches (10 x 10 cm).  · Gauze square: 4 x 4 inches (10 x 10 cm).  How to care for your surgical drain  Care for your drain as told by your health care provider. This is important to help prevent infection. If your drain is placed at your back, or any other hard-to-reach area, ask another person to assist you in performing the following tasks:  General care  · Keep the skin around the drain dry and covered with a dressing at all times.  · Check your drain area every day for signs of infection. Check for:  ? Redness, swelling, or pain.  ? Pus or a bad smell.  ? Cloudy drainage.  ? Tenderness or pressure at the drain exit site.  Changing the dressing  Follow instructions from your health care provider about how to change your dressing. Change your dressing at least once a day. Change it more often if needed to keep the dressing dry. Make sure you:  1. Gather your supplies.  2. Wash your hands with soap and water before you change your dressing. If soap and water are not available, use hand .  3. Remove the old dressing. Avoid using scissors to do that.  4. Wash your hands with soap and water again after removing the old  dressing.  5. Use sterile saline to clean your skin around the drain. You may need to use a cotton swab to clean the skin.  6. Place the tube through the slit in a drain sponge. Place the drain sponge so that it covers your wound.  7. Place the gauze square or another drain sponge on top of the drain sponge that is on the wound. Make sure the tube is between those layers.  8. Tape the dressing to your skin.  9. Tape the drainage tube to your skin 1-2 inches (2.5-5 cm) below the place where the tube enters your body. Taping keeps the tube from pulling on any stitches (sutures) that you have.  10. Wash your hands with soap and water.  11. Write down the color of your drainage and how often you change your dressing.  How to empty your active drain    1. Make sure that you have a measuring cup that you can empty your drainage into.  2. Wash your hands with soap and water. If soap and water are not available, use hand .  3. Loosen any pins or clips that hold the tube in place.  4. If your health care provider tells you to strip the tube to prevent clots and tube blockages:  ? Hold the tube at the skin with one hand. Use your other hand to pinch the tubing with your thumb and first finger.  ? Gently move your fingers down the tube while squeezing very lightly. This clears any drainage, clots, or tissue from the tube.  ? You may need to do this several times each day to keep the tube clear. Do not pull on the tube.  5. Open the bulb cap or the drain plug. Do not touch the inside of the cap or the bottom of the plug.  6. Turn the device upside down and gently squeeze.  7. Empty all of the drainage into the measuring cup.  8. Compress the bulb or the container and replace the cap or the plug. To compress the bulb or the container, squeeze it firmly in the middle while you close the cap or plug the container.  9. Write down the amount of drainage that you have in each 24-hour period. If you have less than 2 Tbsp (30  mL) of drainage during 24 hours, contact your health care provider.  10. Flush the drainage down the toilet.  11. Wash your hands with soap and water.  Contact a health care provider if:  · You have redness, swelling, or pain around your drain area.  · You have pus or a bad smell coming from your drain area.  · You have a fever or chills.  · The skin around your drain is warm to the touch.  · The amount of drainage that you have is increasing instead of decreasing.  · You have drainage that is cloudy.  · There is a sudden stop or a sudden decrease in the amount of drainage that you have.  · Your drain tube falls out.  · Your active drain does not stay compressed after you empty it.  Summary  · Surgical drains are used to remove extra fluid that normally builds up in a surgical wound after surgery.  · Different kinds of surgical drains include active drains and passive drains. Active drains use suction to pull drainage away from the surgical wound, and passive drains allow fluid to drain naturally.  · It is important to care for your drain to prevent infection. If your drain is placed at your back, or any other hard-to-reach area, ask another person to assist you.  · Contact your health care provider if you have redness, swelling, or pain around your drain area.  This information is not intended to replace advice given to you by your health care provider. Make sure you discuss any questions you have with your health care provider.  Document Released: 12/15/2001 Document Revised: 01/22/2020 Document Reviewed: 01/22/2020  [facility] Patient Education © 2020 [facility]      Mastectomy With or Without Reconstruction  Care After  Please read the instructions outlined below and refer to this sheet in the next few weeks. These discharge instructions provide you with general information on caring for yourself after you leave the hospital. Your caregiver may also give you specific instructions. While your treatment has been  "planned according to the most current medical practices available, unavoidable complications occasionally occur. If you have any problems or questions after discharge, please call your caregiver.  POST-OPERATIVE EXERCISES  · Lie in bed with your arm at your side. Raise your arm straight up and back, as if reaching for the headboard.  · Lying in bed, clasp your hands behind your head and push your elbows into the mattress.  · Raise your shoulders and rotate them forward, down, and back in a circular motion to loosen your chest, shoulder, and upper back muscles.  · Lying with your elbow bent and your arm on the bed at a 90 degree angle to your body, rotate your shoulder forward and bring your forearm down toward your feet. Then bring your forearm back up.  · With your arm raised parallel to the floor, clench and unclench your fist.  · Standing with your palm flat against a wall, \"walk\" your fingers up the wall.  SEEK MEDICAL CARE IF:   · There is redness, swelling, or increasing pain in the wound.  · There is pus coming from the wound.  · There is drainage from a wound lasting longer than one day.  · An unexplained oral temperature above 102° F (38.9° C) develops.  · You notice a foul smell coming from the wound or dressing.  · There is a breaking open of a wound (edges not staying together) after the sutures have been removed.  · You develop dizzy episodes or fainting while standing.  · You develop persistent nausea or vomiting.  SEEK IMMEDIATE MEDICAL CARE IF:   · You develop a rash.  · You have difficulty breathing, or develop any reaction or side effects to medications given.  Document Released: 07/07/2006 Document Revised: 03/11/2013 Document Reviewed: 11/19/2008  [employer]® Patient Information ©2014 [employer].      Mastectomy  Care After  HOME CARE   ·  Care for your wound after the bandages are off as told by your doctor.  · Put soft padding such as gauze, soft cloth, or a nursing pad over your wound if you " "wear a bra.  · Ask your doctor about groups that can help you with any emotions you may have after the surgery.  · Exercise your arm and shoulder as told by your doctor.  · Place your hands on a wall. Use your fingers to \"climb the wall.\" Reach as high as you can until you feel a stretch. When you are not exercising, keep your arm raised (elevated).  · When sitting or lying down, put your arm up on pillows or rolled blankets.  · Do not use your arm to lift or push anything heavier than 10 pounds (about one gallon of milk ) for the first 6 weeks.  · Always take good care of the arm on the side that the breast was removed.  · Never let anyone take your blood pressure, draw blood, or give you a shot in that arm.  · Do not get even a small cut on that arm or hand. Use a thimble when you sew. Wear heavy gloves when you garden.  · Use insect repellent on that arm if outside.  · Do not use a razor to shave that underarm. You should use only an electric shaver.  · Do not burn that arm. Use a glove when you reach into the oven. Cover your arm with a towel or wear a long-sleeved shirt when you are out in the sun.  · Wear your watch and other jewelry on the other arm.  · Wear a loose fitting rubber glove when you wash the dishes. Do not leave your hand in water for a long time, especially when you use detergents.  · Do not cut your cuticles or hang nails. Push cuticles back with a towel after you take a bath.  · Carry your purse or any heavy objects in the other arm.  GET HELP RIGHT AWAY IF:   · Your arm becomes very puffy (swollen).  · You have redness or pain at the wound site.  · There is a bad smell coming from the wound.  · Thre is yellowish white fluid (pus) coming from your wound.  · You have a fever.  MAKE SURE YOU:   · Understand these instructions.  · Will watch your condition.  · Will get help right away if you are not doing well or get worse.  Document Released: 09/26/2009 Document Revised: 03/11/2013 Document " Reviewed: 09/26/2009  [employer]® Patient Information ©2014 [email or web address]        Depression / Suicide Risk    As you are discharged from this [facility] facility, it is important to learn how to keep safe from harming yourself.    Recognize the warning signs:  · Abrupt changes in personality, positive or negative- including increase in energy   · Giving away possessions  · Change in eating patterns- significant weight changes-  positive or negative  · Change in sleeping patterns- unable to sleep or sleeping all the time   · Unwillingness or inability to communicate  · Depression  · Unusual sadness, discouragement and loneliness  · Talk of wanting to die  · Neglect of personal appearance   · Rebelliousness- reckless behavior  · Withdrawal from people/activities they love  · Confusion- inability to concentrate     If you or a loved one observes any of these behaviors or has concerns about self-harm, here's what you can do:  · Talk about it- your feelings and reasons for harming yourself  · Remove any means that you might use to hurt yourself (examples: pills, rope, extension cords, firearm)  · Get professional help from the community (Mental Health, Substance Abuse, psychological counseling)  · Do not be alone:Call your Safe Contact- someone whom you trust who will be there for you.  · Call your local CRISIS HOTLINE 954-5779 or 402-628-1015  · Call your local Children's Mobile Crisis Response Team [place] (494) 084-8989 or www.[employer]  · Call the toll free National Suicide Prevention Hotlines   · National Suicide Prevention Lifeline 099-702-TQEB (9390)  · National Hope Line Network 800-SUICIDE (601-6373)        Surgical Drain Record  Empty your surgical drain as told by your health care provider. Use this form to write down the amount of fluid that has collected in the drainage container. Bring this form with you to your follow-up visits.  Surgical drain #1 location: ___________________    Date  __________ Time __________ Amount __________  Date __________ Time __________ Amount __________  Date __________ Time __________ Amount __________  Date __________ Time __________ Amount __________  Date __________ Time __________ Amount __________  Date __________ Time __________ Amount __________  Date __________ Time __________ Amount __________  Date __________ Time __________ Amount __________  Date __________ Time __________ Amount __________  Date __________ Time __________ Amount __________  Date __________ Time __________ Amount __________  Date __________ Time __________ Amount __________  Date __________ Time __________ Amount __________  Date __________ Time __________ Amount __________  Date __________ Time __________ Amount __________  Date __________ Time __________ Amount __________  Date __________ Time __________ Amount __________  Date __________ Time __________ Amount __________  Date __________ Time __________ Amount __________  Date __________ Time __________ Amount __________  Date __________ Time __________ Amount __________  Surgical drain #2 location: ___________________  Date __________ Time __________ Amount __________  Date __________ Time __________ Amount __________  Date __________ Time __________ Amount __________  Date __________ Time __________ Amount __________  Date __________ Time __________ Amount __________  Date __________ Time __________ Amount __________  Date __________ Time __________ Amount __________  Date __________ Time __________ Amount __________  Date __________ Time __________ Amount __________  Date __________ Time __________ Amount __________  Date __________ Time __________ Amount __________  Date __________ Time __________ Amount __________  Date __________ Time __________ Amount __________  Date __________ Time __________ Amount __________  Date __________ Time __________ Amount __________  Date __________ Time __________ Amount __________  Date __________  Time __________ Amount __________  Date __________ Time __________ Amount __________  Date __________ Time __________ Amount __________  Date __________ Time __________ Amount __________  Date __________ Time __________ Amount __________  This information is not intended to replace advice given to you by your health care provider. Make sure you discuss any questions you have with your health care provider.  Document Released: 09/24/2018 Document Revised: 09/24/2018 Document Reviewed: 09/24/2018  [employer] Patient Education © 2020 [employer]

## 2020-11-06 NOTE — PROGRESS NOTES
Trauma / Surgical Daily Progress Note    Date of Service  11/6/2020    Chief Complaint  50 y.o. female admitted 11/5/2020 with FAMILY HISTORY OF BREAST CANCER    Interval Events  SP B simple mastectomy/recon. Doing well. Plan DC this AM.    Review of Systems  Review of Systems   Respiratory: Positive for chest tightness.         Vital Signs for last 24 hours  Temp:  [36.2 °C (97.1 °F)-37.1 °C (98.8 °F)] 36.2 °C (97.1 °F)  Pulse:  [] 66  Resp:  [12-20] 12  BP: (117-146)/(70-93) 125/82  SpO2:  [93 %-100 %] 93 %    Hemodynamic parameters for last 24 hours       Respiratory Data     Respiration: 12, Pulse Oximetry: 93 %             Physical Exam  Physical Exam  Neck:      Musculoskeletal: Neck supple.   Cardiovascular:      Rate and Rhythm: Normal rate.   Pulmonary:      Effort: Pulmonary effort is normal.      Comments: Dressing in place  Chest:      Chest wall: Tenderness present.   Skin:     General: Skin is warm.   Neurological:      General: No focal deficit present.      Mental Status: She is alert.         Laboratory  No results found for this or any previous visit (from the past 24 hour(s)).    Fluids    Intake/Output Summary (Last 24 hours) at 11/6/2020 0749  Last data filed at 11/6/2020 0255  Gross per 24 hour   Intake 2000 ml   Output 890 ml   Net 1110 ml       Core Measures & Quality Metrics  Medications reviewed  Huynh catheter: No Huynh                  NUNO Score  ETOH Screening    Assessment/Plan  At high risk for breast cancer- prophylactic bilateral mastectomy TBD November 2020- (present on admission)  Assessment & Plan  B simple mastectomy with reconstruction        Discussed patient condition with RN and Patient.  CRITICAL CARE TIME EXCLUDING PROCEDURES:20     minutes

## 2020-11-06 NOTE — DISCHARGE SUMMARY
Discharge Summary    CHIEF COMPLAINT ON ADMISSION  Family History of breast cancer    Reason for Admission  FAMILY HISTORY OF BREAST CANCER     Admission Date  11/5/2020    CODE STATUS  Full Code    HPI & HOSPITAL COURSE  The patient is a 50-year-old female who has significant family history of breast cancer.  She has been worked up for genetics, which were   negative, but given her extensive family history of breast cancer, she has elected to have a bilateral simple mastectomy for risk reduction.  The patient post operative coarse was essentially unremarkable.  At the time of discharge she was afebrile, tolerating a regular diet and voiding normally.  Her general exam is unremarkable.  Her chest wall incisions are covered by a provena dressing with 4 DELICIA drains in place with serous drinage .  Patient has been counseled on normal expectations of an uncomplicated post operative coarse.  She was discharged on a regular diet.  She has restarted any pre-admission medications. Her discharge medications were given to the patint preoperatively by Dr. Dang.   She is to follow up with Dr. Felix and Dr. Dang in one week and prn.  Discharge instructions were given. Precautions and limitations were reviewed.  She was instructed on drain and prevena care.     The patient was counseled regarding the benefits of narcotics for post-operative pain in the short term period. The patient was made aware of the alternatives, including heating pads, ice, and NSAID medication.    The risks of addiction, overdose, respiratory depression, and risks during pregnancy (if applicable) along with warning signs of addiction, were discussed.  We discussed taking the medications as prescribed and how they can interact with other medications the patient is currently taking.     The patient was notified not to share the medications with others and understands that these medications are intended to be used only in the short term for post-operative  pain.    I reviewed the Narc[employer]  program, and the patient was deemed not to be at high risk for abuse, and had no concurrent prescriptions.    The patient was given a chance to ask questions, and all questions were answered. Discussion was undertaken with Layman's terms. The patient demonstrated adequate understanding. Consent was given and signed preoperatively in clear state of mind.    The patient has stated understanding and agrees with this plan of care.                 Pathological exam:             Was not avail at the time of discharge.  It will be addressed on an out patient basis.         Therefore, she is discharged in good and stable condition to home with close outpatient follow-up.      Discharge Date  11/6/2020    FOLLOW UP ITEMS POST DISCHARGE  Provena  DELICIA drains X4    DISCHARGE DIAGNOSES  At high risk for breast cancer- prophylactic bilateral mastectomy TBD November 2020- (present on admission)  Assessment & Plan  B simple mastectomy with reconstruction      FOLLOW UP  Future Appointments   Date Time Provider Department Center   11/24/2020  4:10 PM Ya Beasley M.D. [facility] None   12/14/2020  7:00 AM P[facility] None   4/1/2021  9:20 AM Errol Moralez M.D. 27M CELSO Dang M.D.  [address] [facility]  [address]  [place] [id number] [phone]    Schedule an appointment as soon as possible for a visit on 11/9/2020      Franny Felix M.D.  [address] [facility] [address]  [address]  [place] [address] [phone]    Schedule an appointment as soon as possible for a visit on 11/11/2020        MEDICATIONS ON DISCHARGE     Medication List      CHANGE how you take these medications      Instructions   losartan 25 MG Tabs  What changed: when to take this  Commonly known as: COZAAR   Doctor's comments: Switch from lisinopril  Take 1 Tab by mouth every day.  Dose: 25 mg        CONTINUE taking these medications      Instructions   albuterol 108 (90 Base) MCG/ACT Aers inhalation  aerosol   Inhale 2 Puffs by mouth every 6 hours as needed for Shortness of Breath.  Dose: 2 Puff     baclofen 10 MG Tabs  Commonly known as: LIORESAL   Take 1 Tab by mouth 1 time daily as needed.  Dose: 10 mg     rizatriptan 10 MG tablet  Commonly known as: MAXALT   Take 1 Tab by mouth 1 time daily as needed for Migraine.  Dose: 10 mg            Allergies  Allergies   Allergen Reactions   • Lisinopril Cough       DIET  Orders Placed This Encounter   Procedures   • Diet Order Diet: Regular     Standing Status:   Standing     Number of Occurrences:   1     Order Specific Question:   ERAS     Answer:   Yes     Order Specific Question:   Diet:     Answer:   Regular [1]       ACTIVITY  As tolerated.  0-lb lifting restriction    CONSULTATIONS  None    PROCEDURES  1. Bilateral simple mastectomy (Dr. Felix)    2. Bilateral breast reconstruction with prepectoral placement of tissue expanders and use of acellular dermal matrix.  (Dr. Dang)    LABORATORY  Lab Results   Component Value Date    SODIUM 141 11/02/2020    POTASSIUM 4.4 11/02/2020    CHLORIDE 104 11/02/2020    CO2 26 11/02/2020    GLUCOSE 106 (H) 11/02/2020    BUN 21 11/02/2020    CREATININE 0.87 11/02/2020        Lab Results   Component Value Date    WBC 5.5 11/02/2020    HEMOGLOBIN 15.1 11/02/2020    HEMATOCRIT 45.2 11/02/2020    PLATELETCT 250 11/02/2020        Total time of the discharge process exceeds 43 minutes.

## 2020-11-06 NOTE — OP REPORT
DATE OF SERVICE:  11/05/2020    PREOPERATIVE DIAGNOSIS:  Significant familial history of breast cancer.    POSTOPERATIVE DIAGNOSIS:  Significant familial history of breast cancer.    PROCEDURE:  Bilateral simple mastectomy.    SURGEON:  Franny Zeng MD    ASSISTANT:  EMILIE Becerra    ANESTHESIA:  Laryngeal mask.    ANESTHESIOLOGIST:  Xuan Giang MD    INDICATIONS:  The patient is a 50-year-old female who has significant family   history of breast cancer.  She has been worked up for genetics, which were   negative, but given her extensive family history of breast cancer, she has   elected to have a bilateral simple mastectomy for risk reduction.    FINDINGS:  Bilateral simple mastectomy was performed.  A separate dictation   will be provided by Dr. Dang in regards to reconstruction.    PROCEDURE:  After the patient was identified and consented, she was brought to   the operating room and placed in supine position.  The patient underwent   laryngeal mask anesthetic clearance.  The patient's chest was prepped and   draped in sterile fashion.  Right side was done before the left, but both   sides were done similarly.  An elliptical incision was made in the   circumareolar area.  Superior and inferior skin flaps were then developed.    The breast was then amputated off the pectoralis major muscle.  Procedure on   the left side was done then secondarily.  The procedure was then turned over   to Dr. Dang.  A separate dictation will be provided.       ____________________________________     FRANNY ZENG MD    [facility]    DD:  11/05/2020 15:44:35  DT:  11/05/2020 16:17:24    D#:  [id number]  Job#:  [id number]    cc: XUAN GIANG MD, RAOUL WAGNER MD, MAE DANG MD

## 2020-11-06 NOTE — DISCHARGE PLANNING
Anticipated Discharge Disposition: home     Action: Completed chart review; pt POD#1 mastectomy/recon. Per MD note pt will dc this AM if doing well    Barriers to Discharge: none    Plan: Continue to collaborate with the pt, pt's family, and health care team to provide social and discharge support as needed.

## 2020-11-06 NOTE — OP REPORT
DATE OF SERVICE:  11/05/2020    PREOPERATIVE DIAGNOSIS:  Genetic susceptibility to breast cancer.    POSTOPERATIVE DIAGNOSIS:  Genetic susceptibility to breast cancer.    PROCEDURE:  Bilateral breast reconstruction with prepectoral placement of   tissue expanders and use of acellular dermal matrix.    ATTENDING SURGEON:  Amos Dang MD    ANESTHESIOLOGIST:  César Giang MD    ASSISTANT:  Jailene Puentes CST FA    ANESTHESIA TYPE:  General.    SPECIMENS:  There were none for my portion of the operation.  Dr. Felix sent   off the prophylactic mastectomy.    ESTIMATED BLOOD LOSS:  Minimal.    INDICATIONS FOR PROCEDURE:  The patient is a 50-year-old woman who has a   genetic susceptibility to breast cancer that has been estimated to be up to   39% lifetime risk for developing breast cancer.  Because of that, she has had   recommendations to have prophylactic mastectomies.  The patient desires   immediate reconstruction and now presents for the above operation.    INTRAOPERATIVE FINDINGS:  A Sientra 430 mL tissue expander was used,   intraoperatively 120 mL was placed.    DESCRIPTION OF PROCEDURE:  After the operative and nonoperative options were   discussed including the risks, benefits and alternatives, which included but   was not limited to bleeding, infection, damage to surrounding structures, need   for further surgery, reaction to anesthetic agent, scarring, breast   asymmetry, contour irregularities, wound healing difficulties, need for   revisional surgery, tissue expander failure, tissue expander rupture,   mastectomy, skin flap necrosis, hypertrophic or keloid scarring, deep venous   thrombosis, pulmonary embolus, myocardial infarction, stroke, unsatisfactory   result and/or death, informed consent was obtained.  Preoperatively, the   patient was identified.  In a sitting upright position, the patient's sternal   notch, midline and inframammary folds were marked.  The planned mastectomy    incision lines were drawn out in a horizontal elliptical fashion to include   the nipple areolar complexes.  Antibiotics given, sequential compression   devices placed.  Patient brought to the operating room where general   anesthesia was induced.  Her chest prepped and draped in usual sterile   fashion.  Please see Dr. Felix's note for the prophylactic mastectomies.    Following completion of this, then starting on the right hand side, the chest   wall was inspected and the mastectomy skin flaps were inspected.  I felt the   patient would be a good candidate for prepectoral breast reconstruction.  The   pocket was then further dissected medially and superiorly with cautery.    Following this, then 2 medium contour perforated piece of acellular dermal   matrix were then used and sewn into the upper chest wall, medial chest wall   and inframammary fold and then the lateral chest wall with horizontal mattress   2-0 Vicryl sutures.  Following this, then intercostal blocks were placed for   postoperative pain control.  A #7 flat drain was placed and secured with a 3-0   nylon suture.  The pocket was then copiously irrigated with triple antibiotic   irrigation and Betadine.  Then, given the chest wall dimensions, a 430 mL   tissue expander was then chosen.  This was then prepped and using a minimal   touch technique and new gloves, the tissue expander was then placed in the   pocket.  The tabs were then secured with 2-0 Vicryl sutures.  Then, 120 mL was   then placed in the expander.  The dermal matrix was then slightly trimmed.    This was then closed with a running locking 2-0 Vicryl sutures.  The overlying   skin was then closed with 3-0 deep dermal Monocryl sutures and running 4-0   Monocryl subcuticular stitch to close the overlying skin.    We then turned our attention to the left breast where the same procedure was   performed, the same size expander at 430 mL was placed.  Likewise, 120 mL was   placed in the  expander.  The patient was then washed, Steri-Strips and   compressive dressings were then placed.  Biopatches were then placed over the   drains and she was then awakened, extubated, and transferred to the PACU in   stable condition.  At the end of procedure, all sponge, instrument and needle   counts were correct.       ____________________________________     MD YENY VILLEDA / JERMAIN    DD:  11/05/2020 16:48:41  DT:  11/05/2020 18:05:43    D#:  [id number]  Job#:  [id number]

## 2020-11-09 ASSESSMENT — PAIN SCALES - GENERAL: PAIN_LEVEL: 4

## 2020-11-10 NOTE — ANESTHESIA POSTPROCEDURE EVALUATION
Patient: Odalys Ballard    Procedure Summary     Date: 11/05/20 Room / Location: [facility] 09 / SURGERY [facility]    Anesthesia Start: 1451 Anesthesia Stop: 1702    Procedures:       MASTECTOMY (Bilateral Breast)      RECONSTRUCTION, BREAST (Bilateral Breast)      TISSUE EXPANDER- FOR PLACEMENT  AND USE OF ACELLULAR DERMAL MATRIX (Bilateral Breast) Diagnosis: (FAMILY HISTORY OF BREAST CANCER)    Surgeons: Franny Felix M.D.; Amos Dang M.D. Responsible Provider: César Giang M.D.    Anesthesia Type: general ASA Status: 2          Final Anesthesia Type: general  Last vitals    Anesthesia Post Evaluation    Patient location during evaluation: PACU  Patient participation: complete - patient participated  Level of consciousness: awake and alert  Pain score: 4    Airway patency: patent  Anesthetic complications: no  Cardiovascular status: hemodynamically stable  Respiratory status: acceptable  Hydration status: euvolemic    PONV: none           Nurse Pain Score: 4 (NPRS)

## 2020-11-10 NOTE — ANESTHESIA TIME REPORT
Anesthesia Start and Stop Event Times     Date Time Event    11/5/2020 1407 Ready for Procedure     1451 Anesthesia Start     1702 Anesthesia Stop        Responsible Staff  11/05/20    Name Role Begin End    César Giang M.D. Anesth 1451 1702        Preop Diagnosis (Free Text):  Pre-op Diagnosis     FAMILY HISTORY OF BREAST CANCER        Preop Diagnosis (Codes):    Post op Diagnosis  Breast cancer (HCC)      Premium Reason  A. 3PM - 7AM    Comments:

## 2020-12-18 ENCOUNTER — HOSPITAL ENCOUNTER (OUTPATIENT)
Facility: REHABILITATION | Age: 50
End: 2020-12-18
Attending: ANESTHESIOLOGY | Admitting: ANESTHESIOLOGY
Payer: COMMERCIAL

## 2021-01-15 ENCOUNTER — HOSPITAL ENCOUNTER (OUTPATIENT)
Facility: REHABILITATION | Age: 51
End: 2021-01-15
Attending: ANESTHESIOLOGY | Admitting: ANESTHESIOLOGY
Payer: COMMERCIAL

## 2021-01-21 DIAGNOSIS — M62.830 MUSCLE SPASM OF BACK: ICD-10-CM

## 2021-01-21 RX ORDER — BACLOFEN 10 MG/1
TABLET ORAL
Qty: 90 TAB | Refills: 0 | Status: SHIPPED | OUTPATIENT
Start: 2021-01-21 | End: 2021-04-15

## 2021-01-24 DIAGNOSIS — G43.001 MIGRAINE WITHOUT AURA AND WITH STATUS MIGRAINOSUS, NOT INTRACTABLE: ICD-10-CM

## 2021-01-24 RX ORDER — RIZATRIPTAN BENZOATE 10 MG/1
TABLET ORAL
Qty: 12 TAB | Refills: 0 | Status: SHIPPED | OUTPATIENT
Start: 2021-01-24 | End: 2021-03-01

## 2021-01-25 ENCOUNTER — APPOINTMENT (OUTPATIENT)
Dept: PULMONOLOGY | Facility: MEDICAL CENTER | Age: 51
End: 2021-01-25
Attending: INTERNAL MEDICINE
Payer: COMMERCIAL

## 2021-01-26 DIAGNOSIS — G43.001 MIGRAINE WITHOUT AURA AND WITH STATUS MIGRAINOSUS, NOT INTRACTABLE: ICD-10-CM

## 2021-01-27 ENCOUNTER — APPOINTMENT (OUTPATIENT)
Dept: ADMISSIONS | Facility: MEDICAL CENTER | Age: 51
End: 2021-01-27
Payer: COMMERCIAL

## 2021-01-28 ENCOUNTER — APPOINTMENT (OUTPATIENT)
Dept: SLEEP MEDICINE | Facility: MEDICAL CENTER | Age: 51
End: 2021-01-28
Payer: COMMERCIAL

## 2021-01-28 DIAGNOSIS — F51.01 PRIMARY INSOMNIA: ICD-10-CM

## 2021-01-28 DIAGNOSIS — I10 ESSENTIAL HYPERTENSION: ICD-10-CM

## 2021-01-28 RX ORDER — ESZOPICLONE 3 MG/1
3 TABLET, FILM COATED ORAL EVERY EVENING
Qty: 90 TAB | Refills: 0 | Status: SHIPPED | OUTPATIENT
Start: 2021-01-28 | End: 2021-04-23

## 2021-01-28 RX ORDER — LOSARTAN POTASSIUM 25 MG/1
25 TABLET ORAL DAILY
Qty: 90 TAB | Refills: 1 | Status: SHIPPED | OUTPATIENT
Start: 2021-01-28 | End: 2021-05-04

## 2021-02-01 ENCOUNTER — HOSPITAL ENCOUNTER (OUTPATIENT)
Dept: RADIOLOGY | Facility: MEDICAL CENTER | Age: 51
End: 2021-02-01
Attending: SURGERY
Payer: COMMERCIAL

## 2021-02-01 DIAGNOSIS — G54.0 THORACIC OUTLET SYNDROME: ICD-10-CM

## 2021-02-01 PROCEDURE — 93971 EXTREMITY STUDY: CPT | Mod: LT

## 2021-02-01 PROCEDURE — 93971 EXTREMITY STUDY: CPT | Mod: 26,LT | Performed by: INTERNAL MEDICINE

## 2021-02-02 ENCOUNTER — PRE-ADMISSION TESTING (OUTPATIENT)
Dept: ADMISSIONS | Facility: MEDICAL CENTER | Age: 51
End: 2021-02-02
Attending: PLASTIC SURGERY
Payer: COMMERCIAL

## 2021-02-02 DIAGNOSIS — Z01.812 PRE-OPERATIVE LABORATORY EXAMINATION: ICD-10-CM

## 2021-02-02 LAB
ANION GAP SERPL CALC-SCNC: 15 MMOL/L (ref 7–16)
BUN SERPL-MCNC: 24 MG/DL (ref 8–22)
CALCIUM SERPL-MCNC: 9.7 MG/DL (ref 8.5–10.5)
CHLORIDE SERPL-SCNC: 101 MMOL/L (ref 96–112)
CO2 SERPL-SCNC: 22 MMOL/L (ref 20–33)
CREAT SERPL-MCNC: 0.88 MG/DL (ref 0.5–1.4)
ERYTHROCYTE [DISTWIDTH] IN BLOOD BY AUTOMATED COUNT: 41 FL (ref 35.9–50)
GLUCOSE SERPL-MCNC: 105 MG/DL (ref 65–99)
HCT VFR BLD AUTO: 46.3 % (ref 37–47)
HGB BLD-MCNC: 15.1 G/DL (ref 12–16)
MCH RBC QN AUTO: 30.8 PG (ref 27–33)
MCHC RBC AUTO-ENTMCNC: 32.6 G/DL (ref 33.6–35)
MCV RBC AUTO: 94.5 FL (ref 81.4–97.8)
PLATELET # BLD AUTO: 279 K/UL (ref 164–446)
PMV BLD AUTO: 10.3 FL (ref 9–12.9)
POTASSIUM SERPL-SCNC: 3.8 MMOL/L (ref 3.6–5.5)
RBC # BLD AUTO: 4.9 M/UL (ref 4.2–5.4)
SODIUM SERPL-SCNC: 138 MMOL/L (ref 135–145)
WBC # BLD AUTO: 7.2 K/UL (ref 4.8–10.8)

## 2021-02-02 PROCEDURE — 80048 BASIC METABOLIC PNL TOTAL CA: CPT

## 2021-02-02 PROCEDURE — 85027 COMPLETE CBC AUTOMATED: CPT

## 2021-02-02 PROCEDURE — 36415 COLL VENOUS BLD VENIPUNCTURE: CPT

## 2021-02-02 PROCEDURE — 84702 CHORIONIC GONADOTROPIN TEST: CPT

## 2021-02-02 PROCEDURE — 84703 CHORIONIC GONADOTROPIN ASSAY: CPT

## 2021-02-02 RX ORDER — ACETAMINOPHEN 325 MG/1
650 TABLET ORAL EVERY 4 HOURS PRN
COMMUNITY
End: 2021-05-04

## 2021-02-02 ASSESSMENT — FIBROSIS 4 INDEX: FIB4 SCORE: 0.72

## 2021-02-03 LAB — B-HCG SERPL-ACNC: <1 MIU/ML (ref 0–5)

## 2021-02-04 LAB — HCG SERPL QL: NORMAL

## 2021-02-08 ENCOUNTER — APPOINTMENT (OUTPATIENT)
Dept: ADMISSIONS | Facility: MEDICAL CENTER | Age: 51
End: 2021-02-08
Payer: COMMERCIAL

## 2021-02-08 ENCOUNTER — HOSPITAL ENCOUNTER (OUTPATIENT)
Dept: PULMONOLOGY | Facility: MEDICAL CENTER | Age: 51
End: 2021-02-08
Attending: INTERNAL MEDICINE
Payer: COMMERCIAL

## 2021-02-08 ENCOUNTER — PRE-ADMISSION TESTING (OUTPATIENT)
Dept: ADMISSIONS | Facility: MEDICAL CENTER | Age: 51
End: 2021-02-08
Attending: PLASTIC SURGERY
Payer: COMMERCIAL

## 2021-02-08 DIAGNOSIS — Z01.812 PRE-OPERATIVE LABORATORY EXAMINATION: ICD-10-CM

## 2021-02-08 LAB
COVID ORDER STATUS COVID19: NORMAL
SARS-COV-2 RNA RESP QL NAA+PROBE: NOTDETECTED
SPECIMEN SOURCE: NORMAL

## 2021-02-08 PROCEDURE — U0005 INFEC AGEN DETEC AMPLI PROBE: HCPCS

## 2021-02-08 PROCEDURE — C9803 HOPD COVID-19 SPEC COLLECT: HCPCS

## 2021-02-08 PROCEDURE — U0003 INFECTIOUS AGENT DETECTION BY NUCLEIC ACID (DNA OR RNA); SEVERE ACUTE RESPIRATORY SYNDROME CORONAVIRUS 2 (SARS-COV-2) (CORONAVIRUS DISEASE [COVID-19]), AMPLIFIED PROBE TECHNIQUE, MAKING USE OF HIGH THROUGHPUT TECHNOLOGIES AS DESCRIBED BY CMS-2020-01-R: HCPCS

## 2021-02-08 PROCEDURE — 94621 CARDIOPULM EXERCISE TESTING: CPT

## 2021-02-11 ENCOUNTER — HOSPITAL ENCOUNTER (OUTPATIENT)
Facility: MEDICAL CENTER | Age: 51
End: 2021-02-11
Attending: PLASTIC SURGERY | Admitting: PLASTIC SURGERY
Payer: COMMERCIAL

## 2021-02-11 ENCOUNTER — ANESTHESIA EVENT (OUTPATIENT)
Dept: SURGERY | Facility: MEDICAL CENTER | Age: 51
End: 2021-02-11
Payer: COMMERCIAL

## 2021-02-11 ENCOUNTER — ANESTHESIA (OUTPATIENT)
Dept: SURGERY | Facility: MEDICAL CENTER | Age: 51
End: 2021-02-11
Payer: COMMERCIAL

## 2021-02-11 VITALS
SYSTOLIC BLOOD PRESSURE: 158 MMHG | DIASTOLIC BLOOD PRESSURE: 85 MMHG | HEIGHT: 68 IN | HEART RATE: 90 BPM | WEIGHT: 160.94 LBS | RESPIRATION RATE: 18 BRPM | TEMPERATURE: 97.1 F | BODY MASS INDEX: 24.39 KG/M2 | OXYGEN SATURATION: 95 %

## 2021-02-11 PROCEDURE — 502000 HCHG MISC OR IMPLANTS RC 0278: Performed by: PLASTIC SURGERY

## 2021-02-11 PROCEDURE — 700111 HCHG RX REV CODE 636 W/ 250 OVERRIDE (IP): Performed by: ANESTHESIOLOGY

## 2021-02-11 PROCEDURE — 160009 HCHG ANES TIME/MIN: Performed by: PLASTIC SURGERY

## 2021-02-11 PROCEDURE — A9270 NON-COVERED ITEM OR SERVICE: HCPCS | Performed by: PLASTIC SURGERY

## 2021-02-11 PROCEDURE — 160046 HCHG PACU - 1ST 60 MINS PHASE II: Performed by: PLASTIC SURGERY

## 2021-02-11 PROCEDURE — 700101 HCHG RX REV CODE 250: Performed by: ANESTHESIOLOGY

## 2021-02-11 PROCEDURE — 160048 HCHG OR STATISTICAL LEVEL 1-5: Performed by: PLASTIC SURGERY

## 2021-02-11 PROCEDURE — 160035 HCHG PACU - 1ST 60 MINS PHASE I: Performed by: PLASTIC SURGERY

## 2021-02-11 PROCEDURE — 160025 RECOVERY II MINUTES (STATS): Performed by: PLASTIC SURGERY

## 2021-02-11 PROCEDURE — 160047 HCHG PACU  - EA ADDL 30 MINS PHASE II: Performed by: PLASTIC SURGERY

## 2021-02-11 PROCEDURE — 700102 HCHG RX REV CODE 250 W/ 637 OVERRIDE(OP): Performed by: ANESTHESIOLOGY

## 2021-02-11 PROCEDURE — 700105 HCHG RX REV CODE 258: Performed by: PLASTIC SURGERY

## 2021-02-11 PROCEDURE — 700101 HCHG RX REV CODE 250: Performed by: PLASTIC SURGERY

## 2021-02-11 PROCEDURE — 160002 HCHG RECOVERY MINUTES (STAT): Performed by: PLASTIC SURGERY

## 2021-02-11 PROCEDURE — 501838 HCHG SUTURE GENERAL: Performed by: PLASTIC SURGERY

## 2021-02-11 PROCEDURE — 160041 HCHG SURGERY MINUTES - EA ADDL 1 MIN LEVEL 4: Performed by: PLASTIC SURGERY

## 2021-02-11 PROCEDURE — 160029 HCHG SURGERY MINUTES - 1ST 30 MINS LEVEL 4: Performed by: PLASTIC SURGERY

## 2021-02-11 PROCEDURE — 700111 HCHG RX REV CODE 636 W/ 250 OVERRIDE (IP): Performed by: PLASTIC SURGERY

## 2021-02-11 PROCEDURE — A9270 NON-COVERED ITEM OR SERVICE: HCPCS | Performed by: ANESTHESIOLOGY

## 2021-02-11 DEVICE — IMPLANTABLE DEVICE: Type: IMPLANTABLE DEVICE | Site: BREAST | Status: FUNCTIONAL

## 2021-02-11 RX ORDER — EPINEPHRINE 1 MG/ML(1)
AMPUL (ML) INJECTION
Status: DISCONTINUED
Start: 2021-02-11 | End: 2021-02-11 | Stop reason: HOSPADM

## 2021-02-11 RX ORDER — BUPIVACAINE HYDROCHLORIDE AND EPINEPHRINE 5; 5 MG/ML; UG/ML
INJECTION, SOLUTION EPIDURAL; INTRACAUDAL; PERINEURAL
Status: DISCONTINUED
Start: 2021-02-11 | End: 2021-02-11 | Stop reason: HOSPADM

## 2021-02-11 RX ORDER — GENTAMICIN SULFATE 40 MG/ML
INJECTION, SOLUTION INTRAMUSCULAR; INTRAVENOUS
Status: DISCONTINUED
Start: 2021-02-11 | End: 2021-02-11 | Stop reason: HOSPADM

## 2021-02-11 RX ORDER — HYDROMORPHONE HYDROCHLORIDE 1 MG/ML
0.1 INJECTION, SOLUTION INTRAMUSCULAR; INTRAVENOUS; SUBCUTANEOUS
Status: DISCONTINUED | OUTPATIENT
Start: 2021-02-11 | End: 2021-02-11 | Stop reason: HOSPADM

## 2021-02-11 RX ORDER — HALOPERIDOL 5 MG/ML
1 INJECTION INTRAMUSCULAR
Status: DISCONTINUED | OUTPATIENT
Start: 2021-02-11 | End: 2021-02-11 | Stop reason: HOSPADM

## 2021-02-11 RX ORDER — DIPHENHYDRAMINE HYDROCHLORIDE 50 MG/ML
12.5 INJECTION INTRAMUSCULAR; INTRAVENOUS
Status: DISCONTINUED | OUTPATIENT
Start: 2021-02-11 | End: 2021-02-11 | Stop reason: HOSPADM

## 2021-02-11 RX ORDER — CALCIUM CARBONATE 500 MG/1
500 TABLET, CHEWABLE ORAL ONCE
Status: COMPLETED | OUTPATIENT
Start: 2021-02-11 | End: 2021-02-11

## 2021-02-11 RX ORDER — SODIUM CHLORIDE, SODIUM LACTATE, POTASSIUM CHLORIDE, CALCIUM CHLORIDE 600; 310; 30; 20 MG/100ML; MG/100ML; MG/100ML; MG/100ML
INJECTION, SOLUTION INTRAVENOUS CONTINUOUS
Status: DISCONTINUED | OUTPATIENT
Start: 2021-02-11 | End: 2021-02-11 | Stop reason: HOSPADM

## 2021-02-11 RX ORDER — GLYCOPYRROLATE 0.2 MG/ML
INJECTION INTRAMUSCULAR; INTRAVENOUS PRN
Status: DISCONTINUED | OUTPATIENT
Start: 2021-02-11 | End: 2021-02-11 | Stop reason: SURG

## 2021-02-11 RX ORDER — OXYCODONE HCL 5 MG/5 ML
5 SOLUTION, ORAL ORAL
Status: COMPLETED | OUTPATIENT
Start: 2021-02-11 | End: 2021-02-11

## 2021-02-11 RX ORDER — OXYCODONE HCL 5 MG/5 ML
10 SOLUTION, ORAL ORAL
Status: COMPLETED | OUTPATIENT
Start: 2021-02-11 | End: 2021-02-11

## 2021-02-11 RX ORDER — BUPIVACAINE HYDROCHLORIDE AND EPINEPHRINE 5; 5 MG/ML; UG/ML
INJECTION, SOLUTION EPIDURAL; INTRACAUDAL; PERINEURAL
Status: DISCONTINUED | OUTPATIENT
Start: 2021-02-11 | End: 2021-02-11 | Stop reason: HOSPADM

## 2021-02-11 RX ORDER — SODIUM CHLORIDE, SODIUM LACTATE, POTASSIUM CHLORIDE, CALCIUM CHLORIDE 600; 310; 30; 20 MG/100ML; MG/100ML; MG/100ML; MG/100ML
INJECTION, SOLUTION INTRAVENOUS CONTINUOUS
Status: ACTIVE | OUTPATIENT
Start: 2021-02-11 | End: 2021-02-11

## 2021-02-11 RX ORDER — CEFAZOLIN SODIUM 1 G/3ML
INJECTION, POWDER, FOR SOLUTION INTRAMUSCULAR; INTRAVENOUS PRN
Status: DISCONTINUED | OUTPATIENT
Start: 2021-02-11 | End: 2021-02-11 | Stop reason: SURG

## 2021-02-11 RX ORDER — DEXAMETHASONE SODIUM PHOSPHATE 4 MG/ML
INJECTION, SOLUTION INTRA-ARTICULAR; INTRALESIONAL; INTRAMUSCULAR; INTRAVENOUS; SOFT TISSUE PRN
Status: DISCONTINUED | OUTPATIENT
Start: 2021-02-11 | End: 2021-02-11 | Stop reason: SURG

## 2021-02-11 RX ORDER — SCOLOPAMINE TRANSDERMAL SYSTEM 1 MG/1
1 PATCH, EXTENDED RELEASE TRANSDERMAL
COMMUNITY
End: 2021-05-04

## 2021-02-11 RX ORDER — HYDROMORPHONE HYDROCHLORIDE 1 MG/ML
0.2 INJECTION, SOLUTION INTRAMUSCULAR; INTRAVENOUS; SUBCUTANEOUS
Status: DISCONTINUED | OUTPATIENT
Start: 2021-02-11 | End: 2021-02-11 | Stop reason: HOSPADM

## 2021-02-11 RX ORDER — CEFAZOLIN SODIUM 1 G/3ML
INJECTION, POWDER, FOR SOLUTION INTRAMUSCULAR; INTRAVENOUS
Status: DISCONTINUED
Start: 2021-02-11 | End: 2021-02-11 | Stop reason: HOSPADM

## 2021-02-11 RX ORDER — ONDANSETRON 2 MG/ML
INJECTION INTRAMUSCULAR; INTRAVENOUS PRN
Status: DISCONTINUED | OUTPATIENT
Start: 2021-02-11 | End: 2021-02-11 | Stop reason: SURG

## 2021-02-11 RX ORDER — ONDANSETRON 2 MG/ML
4 INJECTION INTRAMUSCULAR; INTRAVENOUS
Status: COMPLETED | OUTPATIENT
Start: 2021-02-11 | End: 2021-02-11

## 2021-02-11 RX ORDER — ROCURONIUM BROMIDE 10 MG/ML
INJECTION, SOLUTION INTRAVENOUS PRN
Status: DISCONTINUED | OUTPATIENT
Start: 2021-02-11 | End: 2021-02-11 | Stop reason: SURG

## 2021-02-11 RX ORDER — HYDROMORPHONE HYDROCHLORIDE 1 MG/ML
0.4 INJECTION, SOLUTION INTRAMUSCULAR; INTRAVENOUS; SUBCUTANEOUS
Status: DISCONTINUED | OUTPATIENT
Start: 2021-02-11 | End: 2021-02-11 | Stop reason: HOSPADM

## 2021-02-11 RX ORDER — LIDOCAINE HYDROCHLORIDE 20 MG/ML
INJECTION, SOLUTION EPIDURAL; INFILTRATION; INTRACAUDAL; PERINEURAL PRN
Status: DISCONTINUED | OUTPATIENT
Start: 2021-02-11 | End: 2021-02-11 | Stop reason: SURG

## 2021-02-11 RX ORDER — MIDAZOLAM HYDROCHLORIDE 1 MG/ML
1 INJECTION INTRAMUSCULAR; INTRAVENOUS
Status: DISCONTINUED | OUTPATIENT
Start: 2021-02-11 | End: 2021-02-11 | Stop reason: HOSPADM

## 2021-02-11 RX ORDER — MEPERIDINE HYDROCHLORIDE 25 MG/ML
12.5 INJECTION INTRAMUSCULAR; INTRAVENOUS; SUBCUTANEOUS
Status: DISCONTINUED | OUTPATIENT
Start: 2021-02-11 | End: 2021-02-11 | Stop reason: HOSPADM

## 2021-02-11 RX ORDER — LIDOCAINE HYDROCHLORIDE 10 MG/ML
INJECTION, SOLUTION EPIDURAL; INFILTRATION; INTRACAUDAL; PERINEURAL
Status: DISCONTINUED
Start: 2021-02-11 | End: 2021-02-11 | Stop reason: HOSPADM

## 2021-02-11 RX ORDER — LIDOCAINE HYDROCHLORIDE 10 MG/ML
INJECTION, SOLUTION INFILTRATION; PERINEURAL
Status: DISCONTINUED
Start: 2021-02-11 | End: 2021-02-11 | Stop reason: HOSPADM

## 2021-02-11 RX ADMIN — CALCIUM CARBONATE 500 MG: 500 TABLET, CHEWABLE ORAL at 16:00

## 2021-02-11 RX ADMIN — ONDANSETRON 4 MG: 2 INJECTION INTRAMUSCULAR; INTRAVENOUS at 13:46

## 2021-02-11 RX ADMIN — FENTANYL CITRATE 100 MCG: 50 INJECTION, SOLUTION INTRAMUSCULAR; INTRAVENOUS at 12:04

## 2021-02-11 RX ADMIN — GLYCOPYRROLATE 0.2 MG: 0.2 INJECTION INTRAMUSCULAR; INTRAVENOUS at 13:06

## 2021-02-11 RX ADMIN — FENTANYL CITRATE 50 MCG: 50 INJECTION, SOLUTION INTRAMUSCULAR; INTRAVENOUS at 15:00

## 2021-02-11 RX ADMIN — FENTANYL CITRATE 50 MCG: 50 INJECTION, SOLUTION INTRAMUSCULAR; INTRAVENOUS at 14:50

## 2021-02-11 RX ADMIN — SUGAMMADEX 200 MG: 100 INJECTION, SOLUTION INTRAVENOUS at 13:55

## 2021-02-11 RX ADMIN — FENTANYL CITRATE 50 MCG: 50 INJECTION, SOLUTION INTRAMUSCULAR; INTRAVENOUS at 15:29

## 2021-02-11 RX ADMIN — MEPERIDINE HYDROCHLORIDE 12.5 MG: 25 INJECTION INTRAMUSCULAR; INTRAVENOUS; SUBCUTANEOUS at 14:32

## 2021-02-11 RX ADMIN — FENTANYL CITRATE 50 MCG: 50 INJECTION, SOLUTION INTRAMUSCULAR; INTRAVENOUS at 12:56

## 2021-02-11 RX ADMIN — CEFAZOLIN 2 G: 330 INJECTION, POWDER, FOR SOLUTION INTRAMUSCULAR; INTRAVENOUS at 12:07

## 2021-02-11 RX ADMIN — FENTANYL CITRATE 50 MCG: 50 INJECTION, SOLUTION INTRAMUSCULAR; INTRAVENOUS at 14:23

## 2021-02-11 RX ADMIN — OXYCODONE HYDROCHLORIDE 10 MG: 5 SOLUTION ORAL at 14:24

## 2021-02-11 RX ADMIN — MEPERIDINE HYDROCHLORIDE 12.5 MG: 25 INJECTION INTRAMUSCULAR; INTRAVENOUS; SUBCUTANEOUS at 14:56

## 2021-02-11 RX ADMIN — PROPOFOL 180 MG: 10 INJECTION, EMULSION INTRAVENOUS at 12:06

## 2021-02-11 RX ADMIN — LIDOCAINE HYDROCHLORIDE 40 MG: 20 INJECTION, SOLUTION EPIDURAL; INFILTRATION; INTRACAUDAL at 12:06

## 2021-02-11 RX ADMIN — FENTANYL CITRATE 50 MCG: 50 INJECTION, SOLUTION INTRAMUSCULAR; INTRAVENOUS at 13:33

## 2021-02-11 RX ADMIN — POVIDONE IODINE 15 ML: 100 SOLUTION TOPICAL at 11:11

## 2021-02-11 RX ADMIN — SODIUM CHLORIDE, POTASSIUM CHLORIDE, SODIUM LACTATE AND CALCIUM CHLORIDE: 600; 310; 30; 20 INJECTION, SOLUTION INTRAVENOUS at 11:11

## 2021-02-11 RX ADMIN — DEXAMETHASONE SODIUM PHOSPHATE 8 MG: 4 INJECTION, SOLUTION INTRA-ARTICULAR; INTRALESIONAL; INTRAMUSCULAR; INTRAVENOUS; SOFT TISSUE at 12:25

## 2021-02-11 RX ADMIN — ROCURONIUM BROMIDE 3775 MCG: 10 INJECTION, SOLUTION INTRAVENOUS at 12:06

## 2021-02-11 RX ADMIN — ONDANSETRON 4 MG: 2 INJECTION INTRAMUSCULAR; INTRAVENOUS at 14:56

## 2021-02-11 ASSESSMENT — PAIN DESCRIPTION - PAIN TYPE
TYPE: SURGICAL PAIN

## 2021-02-11 ASSESSMENT — FIBROSIS 4 INDEX: FIB4 SCORE: 0.64

## 2021-02-11 ASSESSMENT — PAIN SCALES - GENERAL: PAIN_LEVEL: 3

## 2021-02-11 NOTE — OR NURSING
1435 assumed care from AMALIA Williamson    1450 weaned to room air. IV fentanyl 50mcg given for 7/10 pain.    1456 Repeat dose IV demerol and IV zofran given for shivering and nausea    1506 handoff to Laura RN

## 2021-02-11 NOTE — ANESTHESIA PROCEDURE NOTES
Airway    Date/Time: 2/11/2021 12:07 PM  Performed by: César Giang M.D.  Authorized by: César Giang M.D.     Location:  OR  Urgency:  Elective  Indications for Airway Management:  Anesthesia      Spontaneous Ventilation: absent    Sedation Level:  Deep  Preoxygenated: Yes    Patient Position:  Sniffing  Final Airway Type:  Endotracheal airway  Final Endotracheal Airway:  ETT  Cuffed: Yes    Technique Used for Successful ETT Placement:  Direct laryngoscopy    Insertion Site:  Oral  Blade Type:  Dave  Laryngoscope Blade/Videolaryngoscope Blade Size:  3  ETT Size (mm):  7.5  Measured from:  Lips  ETT to Lips (cm):  21  Placement Verified by: auscultation and capnometry    Cormack-Lehane Classification:  Grade IIb - view of arytenoids or posterior of glottis only  Number of Attempts at Approach:  1  Number of Other Approaches Attempted:  0

## 2021-02-11 NOTE — OP REPORT
DATE OF SERVICE:  02/11/2021     PREOPERATIVE DIAGNOSIS:  Cosmetic back, flank, hip, medial and lateral thigh   concerns.     POSTOPERATIVE DIAGNOSIS:  Cosmetic back, flank, hip, medial and lateral thigh   concerns.     PROCEDURE:  Liposuction of the back, flanks, hips, medial and lateral thighs   and abdomen.     ATTENDING SURGEON:  Amos Dang MD     ANESTHESIOLOGIST:  César Giang MD     ASSISTANT:  GRANT Bernal     ANESTHESIA TYPE:  General.     SPECIMENS:  None.     ESTIMATED BLOOD LOSS:  Minimal.     COMPLICATIONS:  No apparent.     INDICATIONS FOR PROCEDURE:  The patient is a 50-year-old woman who is well   known to me who had undergone mastectomies for genetic predisposition to   breast cancer and had reconstruction with tissue expanders.  The patient went   on to successful tissue expansion and now is going to have her stage II   reconstruction.  In addition, the patient has gained weight through this   process and desired further liposuction to contour the above areas.  The   patient now presents for this procedure.     INTRAOPERATIVE FINDINGS:  There was about a liter of tumescent solution used   and about 1.1 liters of lipoaspirate out.     DESCRIPTION OF PROCEDURE:  After the operative and non-operative options were   discussed including risks, benefits and alternatives, which included, but was   not limited to bleeding, infection, damage to surrounding structure, need for   further surgery, reaction to anesthetic agents, scarring, breast asymmetry,   contour irregularities, wound healing difficulties, contour irregularities,   hypertrophic or keloid scarring, intra-abdominal injury, abdominal organ   perforation, deep venous thrombosis, fat embolus, myocardial infarction,   stroke and satisfactory result in her death.  Informed consent was obtained.    Preoperatively, the patient was identified.  The areas of liposuction were   marked in the abdomen, flanks, back, hips and  the medial and lateral thighs.    Antibiotics were given.  Sequential compression devices were placed.  The   patient was brought back to the operating room.  General anesthesia was   induced.  The patient was then rotated into a prone position.  Care was taken   to ensure that all pressure points were adequately padded.  The back, hips and   flanks were prepped and draped in the usual sterile fashion.  A number of   different poke incisions were made in the planned areas for treatment.    Tumescent solution was then widely placed in all locations.  Pre-tunneling was   then performed with a large cannula.  Following this, a combination of 3 and   4 mm Mercedes cannulas were used to perform liposuction in first the upper   back, lower back, flank hips and the lateral thighs and medial thighs.  Care   was taken to try to minimize contour irregularities.  The patient's left   lateral saddleback had more fat than her right hand side.  After the   liposuction was then performed, the fat equalization was then performed with   large cannula to even things out.  The poke incisions were then closed with   interrupted 5-0 fast absorbing sutures.  Steri-Strips and Tegaderms were then   placed.  This cosmetic portion of the operation went from 12:20 p.m. to 12:51   p.m.     The patient was then rotated back into a supine position and the remainder of   her breast reconstruction was performed.  Please see separate dictation for   this.        ______________________________  MD YENY VILLEDA/BENJI    DD:  02/11/2021 14:03  DT:  02/11/2021 14:23    Job#:  [id number]

## 2021-02-11 NOTE — ANESTHESIA TIME REPORT
Anesthesia Start and Stop Event Times     Date Time Event    2/11/2021 1108 Ready for Procedure     1200 Anesthesia Start     1407 Anesthesia Stop        Responsible Staff  02/11/21    Name Role Begin End    César Giang M.D. Anesth 1200 1407        Preop Diagnosis (Free Text):  Pre-op Diagnosis     FAMILY HISTORY OF MALIGNANT NEOPLASM OF BREAST        Preop Diagnosis (Codes):    Post op Diagnosis  Breast cancer (HCC)      Premium Reason  Non-Premium    Comments:

## 2021-02-11 NOTE — OR NURSING
1503 Report received from AMALIA Velasquez. Applied ice pack to R chest.     1700  Discharge instructions reviewed with family member. All questions answered, verbalizes understanding.   IV dc'd, ID bands removed, assisted to change into own clothing. All personal belongings with pt.     1702 Transported to car via wheelchair, accompanied by RN.

## 2021-02-11 NOTE — OR NURSING
1402  RECEIVED PATIENT FROM OR.  REPORT FROM DR. MORAES.  NO AIRWAY IN PLACE.  RESPIRATIONS ARE EVEN AND UNLABORED.  FLUFF AND MESH BRA TO CHEST ARE CDI.      1423  MEDICATED WITH IV FENTANYL FOR C/O NUMEROUS  BODY PAINS 7    1424  MEDICATED WITH PO OXYCODONE.      1432  MEDICATED WITH IV DEMEROL FOR SHIVERING.    1445  REPORT TO DESMOND SNOWDEN RN.

## 2021-02-11 NOTE — OR SURGEON
Immediate Post OP Note    PreOp Diagnosis: history of breast cancer, breast asymmetry, lipodystrophy    PostOp Diagnosis: same    Procedure(s):  RECONSTRUCTION, BREAST - AND REVISION WITH DERMAL GLANDULAR FLAPS, CAPSULECTOMY  EXCISION, FAT GRAFT- FOR FAT GRAFTING  INSERTION OR REMOVAL, TISSUE EXPANDER  INSERTION, IMPLANT, BREAST  LIPOSUCTION-ABDOMEN, FLANKS, HIPS OUTTER THIGHS, AND UPPER BACK    Surgeon(s):  Amos Dang M.D.    Anesthesiologist/Type of Anesthesia:  Anesthesiologist: César Giang M.D./General    Surgical Staff:  Circulator: Jose L Brock R.N.; Imelda Ahn R.N.  Scrub Person: Steph Medina; Rafael Kerr    Specimens removed if any:  * No specimens in log *    Estimated Blood Loss: minimal    Findings: see operative note    Complications: no apparent    #[id number]  #[id number]        2/11/2021 11:36 AM Amos Dang M.D.

## 2021-02-11 NOTE — ANESTHESIA POSTPROCEDURE EVALUATION
Patient: Odalys Ballard    Procedure Summary     Date: 02/11/21 Room / Location: [facility] ROOM 28 / SURGERY SAME DAY [facility]    Anesthesia Start: 1200 Anesthesia Stop: 1407    Procedures:       RECONSTRUCTION, BREAST - AND REVISION WITH DERMAL GLANDULAR FLAPS, CAPSULECTOMY (Bilateral Breast)      EXCISION, FAT GRAFT- FOR FAT GRAFTING (Abdomen)      INSERTION OR REMOVAL, TISSUE EXPANDER (Bilateral Breast)      INSERTION, IMPLANT, BREAST (Bilateral Breast)      LIPOSUCTION-ABDOMEN, FLANKS, HIPS OUTTER THIGHS, AND UPPER BACK (Back) Diagnosis: (FAMILY HISTORY OF MALIGNANT NEOPLASM OF BREAST)    Surgeons: Amos Dang M.D. Responsible Provider: César Giang M.D.    Anesthesia Type: general ASA Status: 2          Final Anesthesia Type: general  Last vitals  BP   Blood Pressure: 145/66    Temp   36.6 °C (97.9 °F)    Pulse   (!) 105   Resp   12    SpO2   98 %      Anesthesia Post Evaluation    Patient location during evaluation: PACU  Patient participation: complete - patient participated  Level of consciousness: awake and alert  Pain score: 3    Airway patency: patent  Anesthetic complications: no  Cardiovascular status: hemodynamically stable  Respiratory status: acceptable  Hydration status: euvolemic    PONV: none          No complications documented.     Nurse Pain Score: 0 (NPRS)

## 2021-02-11 NOTE — OP REPORT
DATE OF SERVICE:  02/11/2021     PREOPERATIVE DIAGNOSIS:  Genetic disposition for breast cancer status post   bilateral mastectomies and reconstruction.     POSTOPERATIVE DIAGNOSIS:  genetic disposition for breast cancer status post   bilateral mastectomies and reconstruction.     PROCEDURES:  1.  Bilateral tissue expander removal and gel implant placement.  2.  Bilateral breast reconstruction with fat grafting with placement of about   90 mL of fat per breast for a total of 180 mL  3.  Bilateral centrally based dermoglandular flaps, 20 cm2 per breast for a   total of 40 cm2.     ATTENDING SURGEON:  Amos Dang MD     ANESTHESIOLOGIST:  César Giang MD     ASSISTANT:  GRANT Bernal     ANESTHESIA:  General.     SPECIMENS:  None.     ESTIMATED BLOOD LOSS:  Minimal.     COMPLICATIONS:  No apparent.     INDICATIONS FOR PROCEDURE:  The patient is a 50-year-old woman who underwent   bilateral mastectomies and reconstruction with placement of tissue expanders   for genetic predisposition to breast cancer.  The patient has gone on to   successful tissue expansion and now presents for the next phase of   reconstruction.     INTRAOPERATIVE FINDINGS: The implant that were placed were Allergan style SSF   Natrelle Inspira SoftTouch breast implant 520 mL, reference #[id number].  On the   right hand side, the serial #[id number] and on the left hand side, serial   #[id number].     This insurance portion of the operation went from 12:51 p.m. to finish around   1400.     DESCRIPTION OF PROCEDURE:  After the operative and nonoperative options were   discussed including the risks, benefits and alternatives, which included but   was not limited to bleeding, infection, damage to surrounding structure, need   for further surgery, reaction to anesthetic agents, scarring, breast   asymmetry, contour irregularities, wound healing difficulties, need for   revisional surgery, implant failure, implant rupture, capsular  contracture   development, dissatisfaction with breast appearance, hypertrophic or keloid   scarring, deep venous thrombosis, pulmonary embolus, myocardial infarction,   stroke, unsatisfactory result and/or death.  Informed consent was obtained.    Preoperatively, the patient was identified.  In a sitting upright position,   the patient's sternal notch, midline and inframammary folds were marked.  The   planned centrally based dermoglandular flaps were marked out.  Areas for fat   graft harvest were marked in the upper and lower back, flanks, medial hips,   medial and lateral thighs.  Antibiotics were given.  Sequential compression   device was placed.  The patient was brought back to the operating room where   general anesthesia was induced.  The patient did undergo a cosmetic   liposuction procedure first on these areas in a prone position.  This portion   of the operation went from 12:20 p.m. to 12:51 p.m.  Following this, the   patient was then rotated back over into a supine position and her chest,   abdomen and flanks were prepped and draped in the usual sterile fashion.  A   poke incision was made in the periumbilical region in the flank.  Tumescent   solution was then placed in all locations.  Adequate time was allowed for   hemostatic effects of the epinephrine to take effect.  Fat was then harvested   from these locations using a [employer] fat harvesting system.  After the fat was   then harvested, it was irrigated 3 times per protocol and then was placed   under 30 mL syringes.  The poke incisions on the abdomen were then closed with   interrupted 5-0 fast absorbing sutures.     We then turned our attention to the breast.  On the right hand side, an   incision was made through the old mastectomy scar.  Cautery was used to   dissect down through the underlying tissue down to the underlying capsule,   which was opened up.  The tissue expander was then deflated and removed.  The   pocket was then irrigated  with triple antibiotic irrigation and Betadine.    Following this, then capsulotomies were performed superiorly and medially to   further open up the pocket.  Laterally, a capsulorrhaphy was performed with   horizontal mattress 2-0 Vicryl sutures.  This was done to help medialize the   pocket.  After doing this, the patient was then put in an upright position.    Different sizers were tried out.  Ultimately, I felt that the 520 mL implant   would be most appropriate.  While in an upright position, the centrally based   dermoglandular flap was then marked out and then incised.  A portion of the   flap was then deepithelialized with a 10 blade and Bovie electrocautery   inferiorly.  The inferiorly based dermoglandular flap was then created.  This   was then mobilized superiorly.  This was then temporarily tailor tacked shut.    Once I was happy with the shape and contour.  The patient was put back down   to the supine position.  The sizer was removed.  The pocket was then copiously   irrigated with triple antibiotic irrigation and Betadine.  Then, using new   gloves and minimal touch technique, the implant was then placed in the pocket.    The deep tissue was then closed with 2-0 Vicryl sutures.  The dermoglandular   flap was then further tacked down with 2-0 Vicryl sutures.  Standing   cutaneous deformities were excised medially and laterally and the dermis of   all incisions was closed with 3-0 deep dermal Monocryl sutures and a running   4-0 Monocryl subcuticular stitch was used to close the overlying skin.     We then turned our attention to the left breast.  The same procedure was   performed.  The same size implant 520 mL was placed.     A poke incision was made on the upper and inferior aspect of the breasts   bilaterally.  Fat was then sequentially grafted using a fanning technique.    Care was taken to ensure the that the equal amounts of fat were then placed   with each passage of the cannula.  There was  about 60 mL of fat placed in the upper pole of   the breast on each side and about 30 mL along the lower pole for a total of 90   mL per breast and a total of 180 mL of fat overall.  The fat was then further   moved into position.  The poke incision was then closed with interrupted 5-0   fast absorbing sutures.  The patient was then washed.  Steri-Strips and   compressive dressings were then placed.  She was then awakened, extubated and   transferred to PACU in stable condition.  At the end of the procedure, all   sponge, instrument and needle counts were correct.        ______________________________  MD YENY VILLEDA/MAURICIO    DD:  02/11/2021 14:00  DT:  02/11/2021 14:38    Job#:  [id number]

## 2021-02-12 NOTE — DISCHARGE INSTRUCTIONS
ACTIVITY: Rest and take it easy for the first 24 hours.  A responsible adult is recommended to remain with you during that time.  It is normal to feel sleepy.  We encourage you to not do anything that requires balance, judgment or coordination.    MILD FLU-LIKE SYMPTOMS ARE NORMAL. YOU MAY EXPERIENCE GENERALIZED MUSCLE ACHES, THROAT IRRITATION, HEADACHE AND/OR SOME NAUSEA.    FOR 24 HOURS DO NOT:  Drive, operate machinery or run household appliances.  Drink beer or alcoholic beverages.   Make important decisions or sign legal documents.    DIET: To avoid nausea, slowly advance diet as tolerated, avoiding spicy or greasy foods for the first day.  Add more substantial food to your diet according to your physician's instructions.  Babies can be fed formula or breast milk as soon as they are hungry.  INCREASE FLUIDS AND FIBER TO AVOID CONSTIPATION.    SURGICAL DRESSING/BATHING: KEEP DRESSINGS CLEAN AND DRY.  DO NOT REMOVE DRESSINGS until you see the doctor.    FOLLOW-UP APPOINTMENT:  A follow-up appointment should be arranged with your doctor in FOLLOW UP WITH DR. DAVENPORT; call to schedule.    You should CALL YOUR PHYSICIAN if you develop:  Fever greater than 101 degrees F.  Pain not relieved by medication, or persistent nausea or vomiting.  Excessive bleeding (blood soaking through dressing) or unexpected drainage from the wound.  Extreme redness or swelling around the incision site, drainage of pus or foul smelling drainage.  Inability to urinate or empty your bladder within 8 hours.  Problems with breathing or chest pain.    You should call 911 if you develop problems with breathing or chest pain.  If you are unable to contact your doctor or surgical center, you should go to the nearest emergency room or urgent care center.    Physician's telephone #: DR. DAVENPORT [phone]    If any questions arise, call your doctor.  If your doctor is  not available, please feel free to call the Surgical Center at [phone]. The Contact Center is open Monday through Friday 7AM to 5PM and may speak to a nurse at [phone], or toll free at [phone].     A registered nurse may call you a few days after your surgery to see how you are doing after your procedure.    MEDICATIONS: Resume taking daily medication.  Take prescribed pain medication with food.  If no medication is prescribed, you may take non-aspirin pain medication if needed.  PAIN MEDICATION CAN BE VERY CONSTIPATING.  Take a stool softener or laxative such as senokot, pericolace, or milk of magnesia if needed.    Prescription given for : Has at home.  Last pain medication given: Oxycodone at 2:20pm.     If your physician has prescribed pain medication that includes Acetaminophen (Tylenol), do not take additional Acetaminophen (Tylenol) while taking the prescribed medication.    Depression / Suicide Risk    As you are discharged from this [facility] facility, it is important to learn how to keep safe from harming yourself.    Recognize the warning signs:  · Abrupt changes in personality, positive or negative- including increase in energy   · Giving away possessions  · Change in eating patterns- significant weight changes-  positive or negative  · Change in sleeping patterns- unable to sleep or sleeping all the time   · Unwillingness or inability to communicate  · Depression  · Unusual sadness, discouragement and loneliness  · Talk of wanting to die  · Neglect of personal appearance   · Rebelliousness- reckless behavior  · Withdrawal from people/activities they love  · Confusion- inability to concentrate     If you or a loved one observes any of these behaviors or has concerns about self-harm, here's what you can do:  · Talk about it- your feelings and reasons for harming yourself  · Remove any means that you might use to hurt yourself (examples: pills, rope, extension cords, firearm)  · Get  professional help from the community (Mental Health, Substance Abuse, psychological counseling)  · Do not be alone:Call your Safe Contact- someone whom you trust who will be there for you.  · Call your local CRISIS HOTLINE 509-0529 or 446-089-0223  · Call your local Children's Mobile Crisis Response Team [place] (850) 788-4261 or www.[employer]Bio  · Call the toll free National Suicide Prevention Hotlines   · National Suicide Prevention Lifeline 635-656-ISJS (3774)  · National Hope Line Network 800-SUICIDE (282-8625)

## 2021-03-01 PROCEDURE — 94621 CARDIOPULM EXERCISE TESTING: CPT | Mod: 26 | Performed by: INTERNAL MEDICINE

## 2021-03-01 NOTE — PROCEDURES
DATE OF SERVICE:  02/08/2021     NONINVASIVE CARDIOPULMONARY EXERCISE TEST     REFERRING PROVIDER:  Ya Beasley MD     INTERPRETING PHYSICIAN:  Juve Amato III, MD     REASON FOR TEST:  Chest wall pain.     HISTORY OF PRESENT ILLNESS:  This is a 50-year-old female who is undergoing a   cardiopulmonary exercise test to work up her complaint of chest wall pain.     ALLERGIES:  LISINOPRIL.     PAST MEDICAL HISTORY:  Mild intermittent asthma, sinusitis, hypertension,   hiatal hernia, endometriosis.     MEDICATIONS:  Losartan, Advair.     SOCIAL HISTORY:  Nonsmoker.     FAMILY HISTORY:  Reviewed and unremarkable.      REVIEW OF SYSTEMS: Currently denying any chest wall pain or discomfort,   shortness of breath, dizziness, lightheadedness, or palpitations.  Otherwise,   review of systems is negative.     PHYSICAL EXAMINATION:  Resting blood pressure 137/91.  Resting heart rate 89,   sinus rhythm.  Saturation is 94% on room air.    METHODS: This test consists of baseline data collected at rest and then 3   minutes of unloaded exercise followed by a ramped protocol of incremental   increases in resistance followed by a 3-minute cool down.  Throughout the   test, oxygen consumption, carbon dioxide production, tidal volume, respiratory   rate, EKG, heart rate, blood pressure, and pulse oximetry were continuously   measured.  The patient stopped the test at 269 hu secondary to leg fatigue   rated 10/10.     RESULTS:  The maximal oxygen uptake is increased at 2979 mL per minute or 163%   predicted.  Heart rate is normal at rest and increases appropriately with   exercise.  Heart rate reserve equals 5 beats per minute.  The blood pressure   is mildly elevated at rest and increases appropriately with exercise.  EKG   shows normal sinus rhythm at rest and sinus tachycardia during the test.    There are no EKG patterns to suggest ischemia.  Oxygen pulse was normal at   rest and increases appropriately during the  exercise.  Minute ventilation is   elevated at rest and increases appropriately to a maximum of 89.4 liters per   minute at 98% of predicted maximum.  Ventilatory reserve equals 1.78 liters.    Analysis of breathing pattern during exercise shows an appropriate increase in   tidal volume compared to respiratory rate.  Respiratory quotient is 1.08 at   peak exercise.  Oxygen saturation is 94% at rest and ranged from 69-94% during   the test.  Of note, there was significant variability in the SpO2   measurements, but no sustained desaturations.  Vd/VT is normal at rest and   decreases appropriately with exercise and anaerobic threshold is detected at   201 hu by V-slope technique or 74% of maximum predicted VO2.     INTERPRETATION:  This test is considered maximal since it meets end of test   criteria for predicted VO2 max, breathing reserve of less than 15 liters per   minute, and heart rate reserve of less than 10 beats per minute.     The aerobic capacity is supranormal.  Peak VO2 is greater than predicted; 12.2  METs were achieved; and anaerobic threshold was detected at 74% of peak VO2.      There are no significant ventilatory abnormalities seen on this test.  The minute ventilation increases appropriately with exercise.  There is no evidence of a sustained drop in tidal volume to suggest exercise-induced bronchoconstriction.    There is significant variability in the SpO2 during this study, however, no sustained desaturations.  This is likely artifact as there are otherwise no significant gas exchange abnormalities.     Overall, this is a normal study demonstrating excellent aerobic fitness.    ______________________________  MD GEOVANNA Kapoor III/CECILLE/SHERRY    DD:  02/28/2021 15:38  DT:  02/28/2021 16:19    Job#:  [id number]     16-Jun-2018

## 2021-03-15 ENCOUNTER — HOSPITAL ENCOUNTER (OUTPATIENT)
Dept: LAB | Facility: MEDICAL CENTER | Age: 51
End: 2021-03-15
Attending: INTERNAL MEDICINE
Payer: COMMERCIAL

## 2021-03-15 DIAGNOSIS — E55.9 VITAMIN D DEFICIENCY: ICD-10-CM

## 2021-03-15 DIAGNOSIS — E78.5 DYSLIPIDEMIA: ICD-10-CM

## 2021-03-15 DIAGNOSIS — I10 ESSENTIAL HYPERTENSION: ICD-10-CM

## 2021-03-15 DIAGNOSIS — R73.01 IFG (IMPAIRED FASTING GLUCOSE): ICD-10-CM

## 2021-03-15 LAB
25(OH)D3 SERPL-MCNC: 50 NG/ML (ref 30–100)
ALBUMIN SERPL BCP-MCNC: 4.7 G/DL (ref 3.2–4.9)
ALBUMIN/GLOB SERPL: 1.9 G/DL
ALP SERPL-CCNC: 75 U/L (ref 30–99)
ALT SERPL-CCNC: 27 U/L (ref 2–50)
ANION GAP SERPL CALC-SCNC: 11 MMOL/L (ref 7–16)
AST SERPL-CCNC: 25 U/L (ref 12–45)
BASOPHILS # BLD AUTO: 1.7 % (ref 0–1.8)
BASOPHILS # BLD: 0.09 K/UL (ref 0–0.12)
BILIRUB SERPL-MCNC: 0.3 MG/DL (ref 0.1–1.5)
BUN SERPL-MCNC: 24 MG/DL (ref 8–22)
CALCIUM SERPL-MCNC: 9.4 MG/DL (ref 8.5–10.5)
CHLORIDE SERPL-SCNC: 104 MMOL/L (ref 96–112)
CHOLEST SERPL-MCNC: 219 MG/DL (ref 100–199)
CO2 SERPL-SCNC: 24 MMOL/L (ref 20–33)
CREAT SERPL-MCNC: 0.76 MG/DL (ref 0.5–1.4)
EOSINOPHIL # BLD AUTO: 0.35 K/UL (ref 0–0.51)
EOSINOPHIL NFR BLD: 6.8 % (ref 0–6.9)
ERYTHROCYTE [DISTWIDTH] IN BLOOD BY AUTOMATED COUNT: 42.4 FL (ref 35.9–50)
EST. AVERAGE GLUCOSE BLD GHB EST-MCNC: 105 MG/DL
FASTING STATUS PATIENT QL REPORTED: NORMAL
GLOBULIN SER CALC-MCNC: 2.5 G/DL (ref 1.9–3.5)
GLUCOSE SERPL-MCNC: 97 MG/DL (ref 65–99)
HBA1C MFR BLD: 5.3 % (ref 4–5.6)
HCT VFR BLD AUTO: 46 % (ref 37–47)
HDLC SERPL-MCNC: 76 MG/DL
HGB BLD-MCNC: 15.6 G/DL (ref 12–16)
IMM GRANULOCYTES # BLD AUTO: 0.02 K/UL (ref 0–0.11)
IMM GRANULOCYTES NFR BLD AUTO: 0.4 % (ref 0–0.9)
LDLC SERPL CALC-MCNC: 133 MG/DL
LYMPHOCYTES # BLD AUTO: 1.64 K/UL (ref 1–4.8)
LYMPHOCYTES NFR BLD: 31.7 % (ref 22–41)
MCH RBC QN AUTO: 32.5 PG (ref 27–33)
MCHC RBC AUTO-ENTMCNC: 33.9 G/DL (ref 33.6–35)
MCV RBC AUTO: 95.8 FL (ref 81.4–97.8)
MONOCYTES # BLD AUTO: 0.51 K/UL (ref 0–0.85)
MONOCYTES NFR BLD AUTO: 9.9 % (ref 0–13.4)
NEUTROPHILS # BLD AUTO: 2.56 K/UL (ref 2–7.15)
NEUTROPHILS NFR BLD: 49.5 % (ref 44–72)
NRBC # BLD AUTO: 0 K/UL
NRBC BLD-RTO: 0 /100 WBC
PLATELET # BLD AUTO: 221 K/UL (ref 164–446)
PMV BLD AUTO: 10.2 FL (ref 9–12.9)
POTASSIUM SERPL-SCNC: 4.4 MMOL/L (ref 3.6–5.5)
PROT SERPL-MCNC: 7.2 G/DL (ref 6–8.2)
RBC # BLD AUTO: 4.8 M/UL (ref 4.2–5.4)
SODIUM SERPL-SCNC: 139 MMOL/L (ref 135–145)
TRIGL SERPL-MCNC: 50 MG/DL (ref 0–149)
TSH SERPL DL<=0.005 MIU/L-ACNC: 2.63 UIU/ML (ref 0.38–5.33)
WBC # BLD AUTO: 5.2 K/UL (ref 4.8–10.8)

## 2021-03-15 PROCEDURE — 36415 COLL VENOUS BLD VENIPUNCTURE: CPT

## 2021-03-15 PROCEDURE — 82306 VITAMIN D 25 HYDROXY: CPT

## 2021-03-15 PROCEDURE — 83036 HEMOGLOBIN GLYCOSYLATED A1C: CPT

## 2021-03-15 PROCEDURE — 80061 LIPID PANEL: CPT

## 2021-03-15 PROCEDURE — 80053 COMPREHEN METABOLIC PANEL: CPT

## 2021-03-15 PROCEDURE — 84443 ASSAY THYROID STIM HORMONE: CPT

## 2021-03-15 PROCEDURE — 85025 COMPLETE CBC W/AUTO DIFF WBC: CPT

## 2021-04-15 DIAGNOSIS — M62.830 MUSCLE SPASM OF BACK: ICD-10-CM

## 2021-04-15 RX ORDER — BACLOFEN 10 MG/1
TABLET ORAL
Qty: 90 TABLET | Refills: 0 | Status: SHIPPED | OUTPATIENT
Start: 2021-04-15 | End: 2021-07-12

## 2021-04-22 ENCOUNTER — APPOINTMENT (OUTPATIENT)
Dept: SLEEP MEDICINE | Facility: MEDICAL CENTER | Age: 51
End: 2021-04-22
Payer: COMMERCIAL

## 2021-04-23 DIAGNOSIS — F51.01 PRIMARY INSOMNIA: ICD-10-CM

## 2021-04-26 RX ORDER — ESZOPICLONE 3 MG/1
TABLET, FILM COATED ORAL
Qty: 90 TABLET | Refills: 0 | Status: SHIPPED | OUTPATIENT
Start: 2021-04-26 | End: 2021-07-19

## 2021-05-04 ENCOUNTER — OFFICE VISIT (OUTPATIENT)
Dept: MEDICAL GROUP | Age: 51
End: 2021-05-04
Payer: COMMERCIAL

## 2021-05-04 VITALS
WEIGHT: 143.6 LBS | OXYGEN SATURATION: 96 % | BODY MASS INDEX: 22.54 KG/M2 | HEIGHT: 67 IN | DIASTOLIC BLOOD PRESSURE: 82 MMHG | HEART RATE: 97 BPM | TEMPERATURE: 99 F | SYSTOLIC BLOOD PRESSURE: 130 MMHG

## 2021-05-04 DIAGNOSIS — E78.5 DYSLIPIDEMIA: ICD-10-CM

## 2021-05-04 DIAGNOSIS — Z00.00 HEALTHCARE MAINTENANCE: ICD-10-CM

## 2021-05-04 DIAGNOSIS — J45.30 MILD PERSISTENT ASTHMA WITHOUT COMPLICATION: ICD-10-CM

## 2021-05-04 DIAGNOSIS — R73.01 IFG (IMPAIRED FASTING GLUCOSE): ICD-10-CM

## 2021-05-04 DIAGNOSIS — Z01.83 ENCOUNTER FOR BLOOD TYPING: ICD-10-CM

## 2021-05-04 DIAGNOSIS — E55.9 VITAMIN D DEFICIENCY: ICD-10-CM

## 2021-05-04 DIAGNOSIS — I10 ESSENTIAL HYPERTENSION: ICD-10-CM

## 2021-05-04 DIAGNOSIS — Z90.13 S/P BILATERAL MASTECTOMY: ICD-10-CM

## 2021-05-04 PROBLEM — Z91.89 AT HIGH RISK FOR BREAST CANCER: Status: RESOLVED | Noted: 2020-10-01 | Resolved: 2021-05-04

## 2021-05-04 PROCEDURE — 99214 OFFICE O/P EST MOD 30 MIN: CPT | Performed by: INTERNAL MEDICINE

## 2021-05-04 ASSESSMENT — ENCOUNTER SYMPTOMS
GASTROINTESTINAL NEGATIVE: 1
CARDIOVASCULAR NEGATIVE: 1
PSYCHIATRIC NEGATIVE: 1
NEUROLOGICAL NEGATIVE: 1
MUSCULOSKELETAL NEGATIVE: 1
RESPIRATORY NEGATIVE: 1
EYES NEGATIVE: 1
CONSTITUTIONAL NEGATIVE: 1

## 2021-05-04 ASSESSMENT — FIBROSIS 4 INDEX: FIB4 SCORE: 1.04

## 2021-05-04 ASSESSMENT — PATIENT HEALTH QUESTIONNAIRE - PHQ9: CLINICAL INTERPRETATION OF PHQ2 SCORE: 0

## 2021-05-04 NOTE — PROGRESS NOTES
Subjective:      Odalys Ballard is a 50 y.o. female who presents with Follow-Up (6 month) and Lab Results (6 month)  And the patient is here for general health maintenance and annual checkup following her prophylactic bilateral mastectomy with reconstruction done November 2020 and February 2021 for high risk breast cancer status.  Doing well postop with no complications.  Multiple family members have breast cancer premenopausally and their other family members with other types of cancer for which she has a very strong family history.  She is BRCA negative.  She still has her ovaries but did undergo hysterectomy.  Follows up with gynecology.    Since last visit she is able to lose weight exercise eat nutritiously and follow salt diet was able to get off her blood pressure medicine.  Her home BPs are all in the 1 20-1 30 range systolic and always less than 90 diastolic.    Her asthma is under good control was able to stop her steroid inhaler which was causing weight gain.  She is on albuterol inhaler as needed and rarely has to use this.    She continues follow-up with her allergist.    She did review her lab results.    .all  No visits with results within 1 Month(s) from this visit.   Latest known visit with results is:   Hospital Outpatient Visit on 03/15/2021   Component Date Value   • TSH 03/15/2021 2.630    • Sodium 03/15/2021 139    • Potassium 03/15/2021 4.4    • Chloride 03/15/2021 104    • Co2 03/15/2021 24    • Anion Gap 03/15/2021 11.0    • Glucose 03/15/2021 97    • Bun 03/15/2021 24*   • Creatinine 03/15/2021 0.76    • Calcium 03/15/2021 9.4    • AST(SGOT) 03/15/2021 25    • ALT(SGPT) 03/15/2021 27    • Alkaline Phosphatase 03/15/2021 75    • Total Bilirubin 03/15/2021 0.3    • Albumin 03/15/2021 4.7    • Total Protein 03/15/2021 7.2    • Globulin 03/15/2021 2.5    • A-G Ratio 03/15/2021 1.9    • Cholesterol,Tot 03/15/2021 219*   • Triglycerides 03/15/2021 50    • HDL 03/15/2021 76    • LDL 03/15/2021  133*   • WBC 03/15/2021 5.2    • RBC 03/15/2021 4.80    • Hemoglobin 03/15/2021 15.6    • Hematocrit 03/15/2021 46.0    • MCV 03/15/2021 95.8    • MCH 03/15/2021 32.5    • MCHC 03/15/2021 33.9    • RDW 03/15/2021 42.4    • Platelet Count 03/15/2021 221    • MPV 03/15/2021 10.2    • Neutrophils-Polys 03/15/2021 49.50    • Lymphocytes 03/15/2021 31.70    • Monocytes 03/15/2021 9.90    • Eosinophils 03/15/2021 6.80    • Basophils 03/15/2021 1.70    • Immature Granulocytes 03/15/2021 0.40    • Nucleated RBC 03/15/2021 0.00    • Neutrophils (Absolute) 03/15/2021 2.56    • Lymphs (Absolute) 03/15/2021 1.64    • Monos (Absolute) 03/15/2021 0.51    • Eos (Absolute) 03/15/2021 0.35    • Baso (Absolute) 03/15/2021 0.09    • Immature Granulocytes (a* 03/15/2021 0.02    • NRBC (Absolute) 03/15/2021 0.00    • 25-Hydroxy   Vitamin D 25 03/15/2021 50    • Glycohemoglobin 03/15/2021 5.3    • Est Avg Glucose 03/15/2021 105    • Fasting Status 03/15/2021 Fasting    • GFR If  03/15/2021 >60    • GFR If Non  [employer]* 03/15/2021 >60       Medications none.  Patient now off losartan and Advair inhaler.    Allergies-lisinopril caused a cough.      Lab Results   Component Value Date/Time    HBA1C 5.3 03/15/2021 08:08 AM    HBA1C 5.4 09/14/2020 04:46 AM     Lab Results   Component Value Date/Time    SODIUM 139 03/15/2021 08:08 AM    POTASSIUM 4.4 03/15/2021 08:08 AM    CHLORIDE 104 03/15/2021 08:08 AM    CO2 24 03/15/2021 08:08 AM    GLUCOSE 97 03/15/2021 08:08 AM    BUN 24 (H) 03/15/2021 08:08 AM    CREATININE 0.76 03/15/2021 08:08 AM    BUNCREATRAT 26 (H) 09/14/2020 04:46 AM    ALKPHOSPHAT 75 03/15/2021 08:08 AM    ASTSGOT 25 03/15/2021 08:08 AM    ALTSGPT 27 03/15/2021 08:08 AM    TBILIRUBIN 0.3 03/15/2021 08:08 AM     No results found for: INR  Lab Results   Component Value Date/Time    CHOLSTRLTOT 219 (H) 03/15/2021 08:08 AM     (H) 03/15/2021 08:08 AM    HDL 76 03/15/2021 08:08 AM    TRIGLYCERIDE 50  "03/15/2021 08:08 AM       No results found for: TESTOSTERONE  Lab Results   Component Value Date/Time    TSH 1.490 06/19/2020 06:34 AM     No results found for: FREET4  No results found for: URICACID  No components found for: VITB12  Lab Results   Component Value Date/Time    25HYDROXY 50 03/15/2021 08:08 AM    25HYDROXY 56.0 06/19/2020 06:34 AM               HPI    Review of Systems   Constitutional: Negative.    HENT: Negative.    Eyes: Negative.    Respiratory: Negative.    Cardiovascular: Negative.    Gastrointestinal: Negative.    Genitourinary: Negative.    Musculoskeletal: Negative.    Skin: Negative.    Neurological: Negative.    Endo/Heme/Allergies: Negative.    Psychiatric/Behavioral: Negative.           Objective:     /82 (BP Location: Right arm, Patient Position: Sitting, BP Cuff Size: Adult)   Pulse 97   Temp 37.2 °C (99 °F) (Temporal)   Ht 1.702 m (5' 7\")   Wt 65.1 kg (143 lb 9.6 oz)   LMP 10/08/2016   SpO2 96%   BMI 22.49 kg/m²      Physical Exam  Vitals reviewed.   Constitutional:       General: She is not in acute distress.     Appearance: She is well-developed. She is not diaphoretic.   HENT:      Head: Normocephalic and atraumatic.      Right Ear: External ear normal.      Left Ear: External ear normal.      Nose: Nose normal.      Mouth/Throat:      Pharynx: No oropharyngeal exudate.   Eyes:      General: No scleral icterus.        Right eye: No discharge.         Left eye: No discharge.      Conjunctiva/sclera: Conjunctivae normal.      Pupils: Pupils are equal, round, and reactive to light.   Neck:      Thyroid: No thyromegaly.      Vascular: No JVD.      Trachea: No tracheal deviation.   Cardiovascular:      Rate and Rhythm: Normal rate and regular rhythm.      Heart sounds: Normal heart sounds. No murmur. No friction rub. No gallop.    Pulmonary:      Effort: Pulmonary effort is normal. No respiratory distress.      Breath sounds: Normal breath sounds. No stridor. No wheezing or " rales.   Chest:      Chest wall: No tenderness.   Abdominal:      General: Bowel sounds are normal. There is no distension.      Palpations: Abdomen is soft. There is no mass.      Tenderness: There is no abdominal tenderness. There is no guarding or rebound.   Musculoskeletal:         General: No tenderness. Normal range of motion.      Cervical back: Normal range of motion and neck supple.   Lymphadenopathy:      Cervical: No cervical adenopathy.   Skin:     General: Skin is warm and dry.      Coloration: Skin is not pale.      Findings: No erythema or rash.   Neurological:      Mental Status: She is alert and oriented to person, place, and time.      Cranial Nerves: No cranial nerve deficit.      Motor: No abnormal muscle tone.      Coordination: Coordination normal.      Deep Tendon Reflexes: Reflexes are normal and symmetric. Reflexes normal.   Psychiatric:         Behavior: Behavior normal.         Thought Content: Thought content normal.         Judgment: Judgment normal.                 Assessment/Plan:        1. Healthcare maintenance  Completed   Up-to-date on all measures including Covid vaccination mRNA platform completed 2 weeks ago.  2. Mild persistent asthma without complication  Under good control with lungs free of wheezes this time.  Continue with albuterol inhaler.    3. IFG (impaired fasting glucose)  This is resolved fasting blood sugars now less than 100 and A1c is less than 5.5.  - HEMOGLOBIN A1C; Future    4. Essential hypertension     Under good control without medicine on low-sodium diet.  BP consistently less than 130 systolic.  5. S/P bilateral mastectomy- prophylactic for hisk status [id number]  Good recovery from surgery.  No complications.  No infection.  Seems in good spirits.  Feels quite relieved that she does not worry about her high risk for breast cancer.    6. Vitamin D deficiency  Under good control.  Continue supplements.  2000 units of vitamin D3 daily.  - VITAMIN D,25  HYDROXY; Future    7. Dyslipidemia  Good control with diet exercise.  Check labs in 6 months  - TSH; Future  - Comp Metabolic Panel; Future  - Lipid Profile; Future  - CBC WITH DIFFERENTIAL; Future    8. Encounter for blood typing      - ABO AND RH DETERMINATION; Future  - ABO AND RH DETERMINATION; Future

## 2021-05-06 ENCOUNTER — TELEPHONE (OUTPATIENT)
Dept: MEDICAL GROUP | Age: 51
End: 2021-05-06

## 2021-06-28 ENCOUNTER — HOSPITAL ENCOUNTER (OUTPATIENT)
Dept: RADIOLOGY | Facility: MEDICAL CENTER | Age: 51
End: 2021-06-28
Attending: SURGERY
Payer: COMMERCIAL

## 2021-06-28 DIAGNOSIS — G54.0 TOS (THORACIC OUTLET SYNDROME): ICD-10-CM

## 2021-06-28 PROCEDURE — 700117 HCHG RX CONTRAST REV CODE 255: Performed by: SURGERY

## 2021-06-28 PROCEDURE — 70498 CT ANGIOGRAPHY NECK: CPT

## 2021-06-28 RX ADMIN — IOHEXOL 80 ML: 350 INJECTION, SOLUTION INTRAVENOUS at 17:40

## 2021-06-29 ENCOUNTER — HOSPITAL ENCOUNTER (OUTPATIENT)
Dept: RADIOLOGY | Facility: MEDICAL CENTER | Age: 51
End: 2021-06-29
Attending: SURGERY
Payer: COMMERCIAL

## 2021-06-29 DIAGNOSIS — G54.0 TOS (THORACIC OUTLET SYNDROME): ICD-10-CM

## 2021-06-29 PROCEDURE — 73206 CT ANGIO UPR EXTRM W/O&W/DYE: CPT | Mod: LT

## 2021-07-12 DIAGNOSIS — M62.830 MUSCLE SPASM OF BACK: ICD-10-CM

## 2021-07-12 RX ORDER — BACLOFEN 10 MG/1
TABLET ORAL
Qty: 90 TABLET | Refills: 3 | Status: SHIPPED | OUTPATIENT
Start: 2021-07-12 | End: 2022-06-01

## 2021-07-19 DIAGNOSIS — F51.01 PRIMARY INSOMNIA: ICD-10-CM

## 2021-07-19 RX ORDER — ESZOPICLONE 3 MG/1
TABLET, FILM COATED ORAL
Qty: 90 TABLET | Refills: 3 | Status: SHIPPED | OUTPATIENT
Start: 2021-07-19 | End: 2021-10-17

## 2021-09-19 DIAGNOSIS — G43.001 MIGRAINE WITHOUT AURA AND WITH STATUS MIGRAINOSUS, NOT INTRACTABLE: ICD-10-CM

## 2021-09-20 RX ORDER — RIZATRIPTAN BENZOATE 10 MG/1
TABLET ORAL
Qty: 12 TABLET | Refills: 0 | Status: SHIPPED | OUTPATIENT
Start: 2021-09-20 | End: 2021-10-20

## 2021-10-20 DIAGNOSIS — G43.001 MIGRAINE WITHOUT AURA AND WITH STATUS MIGRAINOSUS, NOT INTRACTABLE: ICD-10-CM

## 2021-10-20 RX ORDER — RIZATRIPTAN BENZOATE 10 MG/1
TABLET ORAL
Qty: 12 TABLET | Refills: 0 | Status: SHIPPED | OUTPATIENT
Start: 2021-10-20 | End: 2021-11-14

## 2021-11-14 DIAGNOSIS — G43.001 MIGRAINE WITHOUT AURA AND WITH STATUS MIGRAINOSUS, NOT INTRACTABLE: ICD-10-CM

## 2021-11-14 RX ORDER — RIZATRIPTAN BENZOATE 10 MG/1
TABLET ORAL
Qty: 12 TABLET | Refills: 0 | Status: SHIPPED | OUTPATIENT
Start: 2021-11-14 | End: 2021-12-19

## 2021-11-19 ENCOUNTER — HOSPITAL ENCOUNTER (OUTPATIENT)
Dept: RADIOLOGY | Facility: MEDICAL CENTER | Age: 51
End: 2021-11-19
Attending: SURGERY
Payer: COMMERCIAL

## 2021-11-19 DIAGNOSIS — I77.74 DISSECTION OF VERTEBRAL ARTERY (HCC): ICD-10-CM

## 2021-11-19 PROCEDURE — 93931 UPPER EXTREMITY STUDY: CPT | Mod: LT

## 2021-11-19 PROCEDURE — 93880 EXTRACRANIAL BILAT STUDY: CPT

## 2021-11-19 PROCEDURE — 93880 EXTRACRANIAL BILAT STUDY: CPT | Mod: 26 | Performed by: INTERNAL MEDICINE

## 2021-11-19 PROCEDURE — 93931 UPPER EXTREMITY STUDY: CPT | Mod: 26,LT | Performed by: INTERNAL MEDICINE

## 2021-12-17 DIAGNOSIS — G43.001 MIGRAINE WITHOUT AURA AND WITH STATUS MIGRAINOSUS, NOT INTRACTABLE: ICD-10-CM

## 2021-12-19 RX ORDER — RIZATRIPTAN BENZOATE 10 MG/1
TABLET ORAL
Qty: 12 TABLET | Refills: 0 | Status: SHIPPED | OUTPATIENT
Start: 2021-12-19 | End: 2022-01-24

## 2022-01-23 DIAGNOSIS — G43.001 MIGRAINE WITHOUT AURA AND WITH STATUS MIGRAINOSUS, NOT INTRACTABLE: ICD-10-CM

## 2022-01-24 ENCOUNTER — HOSPITAL ENCOUNTER (OUTPATIENT)
Dept: LAB | Facility: MEDICAL CENTER | Age: 52
End: 2022-01-24
Attending: INTERNAL MEDICINE
Payer: COMMERCIAL

## 2022-01-24 DIAGNOSIS — R73.01 IFG (IMPAIRED FASTING GLUCOSE): ICD-10-CM

## 2022-01-24 DIAGNOSIS — E55.9 VITAMIN D DEFICIENCY: ICD-10-CM

## 2022-01-24 DIAGNOSIS — E78.5 DYSLIPIDEMIA: ICD-10-CM

## 2022-01-24 DIAGNOSIS — Z01.83 ENCOUNTER FOR BLOOD TYPING: ICD-10-CM

## 2022-01-24 LAB
25(OH)D3 SERPL-MCNC: 38 NG/ML (ref 30–100)
ABO GROUP BLD: NORMAL
ALBUMIN SERPL BCP-MCNC: 5.2 G/DL (ref 3.2–4.9)
ALBUMIN/GLOB SERPL: 2.4 G/DL
ALP SERPL-CCNC: 58 U/L (ref 30–99)
ALT SERPL-CCNC: 20 U/L (ref 2–50)
ANION GAP SERPL CALC-SCNC: 14 MMOL/L (ref 7–16)
AST SERPL-CCNC: 28 U/L (ref 12–45)
BASOPHILS # BLD AUTO: 1.3 % (ref 0–1.8)
BASOPHILS # BLD: 0.06 K/UL (ref 0–0.12)
BILIRUB SERPL-MCNC: 0.4 MG/DL (ref 0.1–1.5)
BUN SERPL-MCNC: 22 MG/DL (ref 8–22)
CALCIUM SERPL-MCNC: 9.7 MG/DL (ref 8.5–10.5)
CHLORIDE SERPL-SCNC: 107 MMOL/L (ref 96–112)
CHOLEST SERPL-MCNC: 207 MG/DL (ref 100–199)
CO2 SERPL-SCNC: 22 MMOL/L (ref 20–33)
CREAT SERPL-MCNC: 0.87 MG/DL (ref 0.5–1.4)
EOSINOPHIL # BLD AUTO: 0.11 K/UL (ref 0–0.51)
EOSINOPHIL NFR BLD: 2.5 % (ref 0–6.9)
ERYTHROCYTE [DISTWIDTH] IN BLOOD BY AUTOMATED COUNT: 41.7 FL (ref 35.9–50)
EST. AVERAGE GLUCOSE BLD GHB EST-MCNC: 114 MG/DL
FASTING STATUS PATIENT QL REPORTED: NORMAL
GLOBULIN SER CALC-MCNC: 2.2 G/DL (ref 1.9–3.5)
GLUCOSE SERPL-MCNC: 102 MG/DL (ref 65–99)
HBA1C MFR BLD: 5.6 % (ref 4–5.6)
HCT VFR BLD AUTO: 43.3 % (ref 37–47)
HDLC SERPL-MCNC: 83 MG/DL
HGB BLD-MCNC: 14.5 G/DL (ref 12–16)
IMM GRANULOCYTES # BLD AUTO: 0.01 K/UL (ref 0–0.11)
IMM GRANULOCYTES NFR BLD AUTO: 0.2 % (ref 0–0.9)
LDLC SERPL CALC-MCNC: 115 MG/DL
LYMPHOCYTES # BLD AUTO: 1.57 K/UL (ref 1–4.8)
LYMPHOCYTES NFR BLD: 35 % (ref 22–41)
MCH RBC QN AUTO: 31.5 PG (ref 27–33)
MCHC RBC AUTO-ENTMCNC: 33.5 G/DL (ref 33.6–35)
MCV RBC AUTO: 93.9 FL (ref 81.4–97.8)
MONOCYTES # BLD AUTO: 0.36 K/UL (ref 0–0.85)
MONOCYTES NFR BLD AUTO: 8 % (ref 0–13.4)
NEUTROPHILS # BLD AUTO: 2.37 K/UL (ref 2–7.15)
NEUTROPHILS NFR BLD: 53 % (ref 44–72)
NRBC # BLD AUTO: 0 K/UL
NRBC BLD-RTO: 0 /100 WBC
PLATELET # BLD AUTO: 244 K/UL (ref 164–446)
PMV BLD AUTO: 10.6 FL (ref 9–12.9)
POTASSIUM SERPL-SCNC: 4.1 MMOL/L (ref 3.6–5.5)
PROT SERPL-MCNC: 7.4 G/DL (ref 6–8.2)
RBC # BLD AUTO: 4.61 M/UL (ref 4.2–5.4)
RH BLD: NORMAL
SODIUM SERPL-SCNC: 143 MMOL/L (ref 135–145)
TRIGL SERPL-MCNC: 47 MG/DL (ref 0–149)
TSH SERPL DL<=0.005 MIU/L-ACNC: 1.53 UIU/ML (ref 0.38–5.33)
WBC # BLD AUTO: 4.5 K/UL (ref 4.8–10.8)

## 2022-01-24 PROCEDURE — 82306 VITAMIN D 25 HYDROXY: CPT

## 2022-01-24 PROCEDURE — 85025 COMPLETE CBC W/AUTO DIFF WBC: CPT

## 2022-01-24 PROCEDURE — 84443 ASSAY THYROID STIM HORMONE: CPT

## 2022-01-24 PROCEDURE — 86900 BLOOD TYPING SEROLOGIC ABO: CPT

## 2022-01-24 PROCEDURE — 80053 COMPREHEN METABOLIC PANEL: CPT

## 2022-01-24 PROCEDURE — 80061 LIPID PANEL: CPT

## 2022-01-24 PROCEDURE — 36415 COLL VENOUS BLD VENIPUNCTURE: CPT

## 2022-01-24 PROCEDURE — 86901 BLOOD TYPING SEROLOGIC RH(D): CPT

## 2022-01-24 PROCEDURE — 83036 HEMOGLOBIN GLYCOSYLATED A1C: CPT

## 2022-01-24 RX ORDER — RIZATRIPTAN BENZOATE 10 MG/1
TABLET ORAL
Qty: 12 TABLET | Refills: 0 | Status: SHIPPED | OUTPATIENT
Start: 2022-01-24 | End: 2022-02-22

## 2022-02-22 DIAGNOSIS — G43.001 MIGRAINE WITHOUT AURA AND WITH STATUS MIGRAINOSUS, NOT INTRACTABLE: ICD-10-CM

## 2022-02-22 RX ORDER — RIZATRIPTAN BENZOATE 10 MG/1
TABLET ORAL
Qty: 12 TABLET | Refills: 0 | Status: SHIPPED | OUTPATIENT
Start: 2022-02-22 | End: 2022-03-21

## 2022-02-22 NOTE — TELEPHONE ENCOUNTER
Received request via: Pharmacy    Was the patient seen in the last year in this department? Yes 5/4/21    Does the patient have an active prescription (recently filled or refills available) for medication(s) requested? No

## 2022-03-03 ENCOUNTER — OFFICE VISIT (OUTPATIENT)
Dept: MEDICAL GROUP | Age: 52
End: 2022-03-03
Payer: COMMERCIAL

## 2022-03-03 VITALS
RESPIRATION RATE: 12 BRPM | HEIGHT: 67 IN | DIASTOLIC BLOOD PRESSURE: 90 MMHG | SYSTOLIC BLOOD PRESSURE: 162 MMHG | WEIGHT: 140 LBS | TEMPERATURE: 100.2 F | BODY MASS INDEX: 21.97 KG/M2 | OXYGEN SATURATION: 96 % | HEART RATE: 99 BPM

## 2022-03-03 DIAGNOSIS — I77.74 DISSECTION OF VERTEBRAL ARTERY (HCC): ICD-10-CM

## 2022-03-03 DIAGNOSIS — R73.01 IFG (IMPAIRED FASTING GLUCOSE): ICD-10-CM

## 2022-03-03 DIAGNOSIS — I10 ESSENTIAL HYPERTENSION: ICD-10-CM

## 2022-03-03 DIAGNOSIS — E78.5 DYSLIPIDEMIA: ICD-10-CM

## 2022-03-03 PROCEDURE — 99214 OFFICE O/P EST MOD 30 MIN: CPT | Performed by: INTERNAL MEDICINE

## 2022-03-03 RX ORDER — ESZOPICLONE 3 MG/1
3 TABLET, FILM COATED ORAL NIGHTLY
COMMUNITY
End: 2022-04-06

## 2022-03-03 RX ORDER — LOSARTAN POTASSIUM 25 MG/1
25 TABLET ORAL DAILY
Qty: 90 TABLET | Refills: 1 | Status: SHIPPED | OUTPATIENT
Start: 2022-03-03 | End: 2022-03-07

## 2022-03-03 RX ORDER — LOSARTAN POTASSIUM 50 MG/1
50 TABLET ORAL DAILY
Qty: 90 TABLET | Refills: 90 | Status: SHIPPED | OUTPATIENT
Start: 2022-03-03 | End: 2022-03-07 | Stop reason: SDUPTHER

## 2022-03-03 RX ORDER — LOSARTAN POTASSIUM 50 MG/1
50 TABLET ORAL DAILY
Qty: 30 TABLET | Refills: 90 | Status: SHIPPED | OUTPATIENT
Start: 2022-03-03 | End: 2022-03-03

## 2022-03-03 RX ORDER — LOSARTAN POTASSIUM 50 MG/1
50 TABLET ORAL DAILY
Qty: 90 TABLET | Refills: 90 | Status: SHIPPED
Start: 2022-03-03 | End: 2022-03-03 | Stop reason: SDUPTHER

## 2022-03-03 ASSESSMENT — ENCOUNTER SYMPTOMS
PSYCHIATRIC NEGATIVE: 1
EYES NEGATIVE: 1
MUSCULOSKELETAL NEGATIVE: 1
RESPIRATORY NEGATIVE: 1
CARDIOVASCULAR NEGATIVE: 1
NEUROLOGICAL NEGATIVE: 1
GASTROINTESTINAL NEGATIVE: 1
CONSTITUTIONAL NEGATIVE: 1

## 2022-03-03 ASSESSMENT — FIBROSIS 4 INDEX: FIB4 SCORE: 1.26

## 2022-03-03 ASSESSMENT — PATIENT HEALTH QUESTIONNAIRE - PHQ9: CLINICAL INTERPRETATION OF PHQ2 SCORE: 0

## 2022-03-04 ENCOUNTER — TELEPHONE (OUTPATIENT)
Dept: MEDICAL GROUP | Age: 52
End: 2022-03-04
Payer: COMMERCIAL

## 2022-03-04 NOTE — TELEPHONE ENCOUNTER
1. Caller Name: [facility]                         Call Back Number: [phone]      How would the patient prefer to be contacted with a response: Phone call OK to leave a detailed message    Pharmacy called to clarify which dose of losartan the patient is to be on. the 25MG script states that it is replacing the 50MG script. The 50MG script also says that it is replacing the 25MG script. Please advise.

## 2022-03-04 NOTE — PROGRESS NOTES
Subjective     Odalys Ballard is a 51 y.o. female who presents with Lab Results  The patient is here for followup of chronic medical problems listed below. The patient is compliant with medications and having no side effects from them. Denies chest pain, abdominal pain, dyspnea, myalgias, or cough.   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 Saldana 03/03/2022   • S/P bilateral mastectomy 05/04/2021   • Excessive sodium intake 10/01/2020   • Cervical radiculopathy at C6 10/01/2020   • Essential hypertension 10/01/2020   • IFG (impaired fasting glucose) 10/01/2020   • Vitamin D deficiency 10/01/2020   • Dyslipidemia 10/01/2020   • Primary insomnia 10/01/2020   • Mild persistent asthma without complication 06/15/2020   • Family history of breast cancer gene mutation in first degree relative 06/15/2020   • White coat syndrome with diagnosis of hypertension 06/15/2020   • Migraine without aura and with status migrainosus, not intractable 06/15/2020     Lisinopril  Outpatient Medications Prior to Visit   Medication Sig Dispense Refill   • Eszopiclone 3 MG Tab Lunesta 3 mg tablet    1 tablet every day by oral route.     • aspirin EC (ECOTRIN) 81 MG Tablet Delayed Response Take 81 mg by mouth every day.     • rizatriptan (MAXALT) 10 MG tablet TAKE 1 TABLET BY MOUTH ONCE DAILY AS NEEDED FOR MIGRAINE HEADACHE 12 Tablet 0   • baclofen (LIORESAL) 10 MG Tab TAKE 1 TABLET BY MOUTH ONCE DAILY AS NEEDED 90 tablet 3   • albuterol 108 (90 Base) MCG/ACT Aero Soln inhalation aerosol Inhale 2 Puffs by mouth every 6 hours as needed for Shortness of Breath. 8.5 g 11     No facility-administered medications prior to visit.     No visits with results within 1 Month(s) from this visit.   Latest known visit with results is:   Hospital Outpatient Visit on 01/24/2022   Component Date Value   • TSH 01/24/2022 1.530    • ABO Grouping Only 01/24/2022 O    • Rh Grouping Only  01/24/2022 POS    • 25-Hydroxy   Vitamin D 25 01/24/2022 38    • Glycohemoglobin 01/24/2022 5.6    • Est Avg Glucose 01/24/2022 114    • WBC 01/24/2022 4.5 (A)   • RBC 01/24/2022 4.61    • Hemoglobin 01/24/2022 14.5    • Hematocrit 01/24/2022 43.3    • MCV 01/24/2022 93.9    • MCH 01/24/2022 31.5    • MCHC 01/24/2022 33.5 (A)   • RDW 01/24/2022 41.7    • Platelet Count 01/24/2022 244    • MPV 01/24/2022 10.6    • Neutrophils-Polys 01/24/2022 53.00    • Lymphocytes 01/24/2022 35.00    • Monocytes 01/24/2022 8.00    • Eosinophils 01/24/2022 2.50    • Basophils 01/24/2022 1.30    • Immature Granulocytes 01/24/2022 0.20    • Nucleated RBC 01/24/2022 0.00    • Neutrophils (Absolute) 01/24/2022 2.37    • Lymphs (Absolute) 01/24/2022 1.57    • Monos (Absolute) 01/24/2022 0.36    • Eos (Absolute) 01/24/2022 0.11    • Baso (Absolute) 01/24/2022 0.06    • Immature Granulocytes (a* 01/24/2022 0.01    • NRBC (Absolute) 01/24/2022 0.00    • Cholesterol,Tot 01/24/2022 207 (A)   • Triglycerides 01/24/2022 47    • HDL 01/24/2022 83    • LDL 01/24/2022 115 (A)   • Sodium 01/24/2022 143    • Potassium 01/24/2022 4.1    • Chloride 01/24/2022 107    • Co2 01/24/2022 22    • Anion Gap 01/24/2022 14.0    • Glucose 01/24/2022 102 (A)   • Bun 01/24/2022 22    • Creatinine 01/24/2022 0.87    • Calcium 01/24/2022 9.7    • AST(SGOT) 01/24/2022 28    • ALT(SGPT) 01/24/2022 20    • Alkaline Phosphatase 01/24/2022 58    • Total Bilirubin 01/24/2022 0.4    • Albumin 01/24/2022 5.2 (A)   • Total Protein 01/24/2022 7.4    • Globulin 01/24/2022 2.2    • A-G Ratio 01/24/2022 2.4    • Fasting Status 01/24/2022 Fasting    • GFR If  01/24/2022 >60    • GFR If Non  [employer]meri* 01/24/2022 >60       Lab Results   Component Value Date/Time    HBA1C 5.6 01/24/2022 09:37 AM    HBA1C 5.3 03/15/2021 08:08 AM     Lab Results   Component Value Date/Time    SODIUM 143 01/24/2022 09:37 AM    POTASSIUM 4.1 01/24/2022 09:37 AM    CHLORIDE 107  "01/24/2022 09:37 AM    CO2 22 01/24/2022 09:37 AM    GLUCOSE 102 (H) 01/24/2022 09:37 AM    BUN 22 01/24/2022 09:37 AM    CREATININE 0.87 01/24/2022 09:37 AM    BUNCREATRAT 26 (H) 09/14/2020 04:46 AM    ALKPHOSPHAT 58 01/24/2022 09:37 AM    ASTSGOT 28 01/24/2022 09:37 AM    ALTSGPT 20 01/24/2022 09:37 AM    TBILIRUBIN 0.4 01/24/2022 09:37 AM     No results found for: INR  Lab Results   Component Value Date/Time    CHOLSTRLTOT 207 (H) 01/24/2022 09:37 AM     (H) 01/24/2022 09:37 AM    HDL 83 01/24/2022 09:37 AM    TRIGLYCERIDE 47 01/24/2022 09:37 AM       No results found for: TESTOSTERONE  Lab Results   Component Value Date/Time    TSH 1.490 06/19/2020 06:34 AM     No results found for: FREET4  No results found for: URICACID  No components found for: VITB12  Lab Results   Component Value Date/Time    25HYDROXY 38 01/24/2022 09:37 AM    25HYDROXY 50 03/15/2021 08:08 AM               HPI    Review of Systems   Constitutional: Negative.    HENT: Negative.    Eyes: Negative.    Respiratory: Negative.    Cardiovascular: Negative.    Gastrointestinal: Negative.    Genitourinary: Negative.    Musculoskeletal: Negative.    Skin: Negative.    Neurological: Negative.    Endo/Heme/Allergies: Negative.    Psychiatric/Behavioral: Negative.               Objective     BP (!) 162/90   Pulse 99   Temp 37.9 °C (100.2 °F) (Temporal)   Resp 12   Ht 1.702 m (5' 7\")   Wt 63.5 kg (140 lb)   LMP 10/08/2016   SpO2 96%   BMI 21.93 kg/m²      Physical Exam  Vitals reviewed.   Constitutional:       General: She is not in acute distress.     Appearance: She is well-developed. She is not diaphoretic.   HENT:      Head: Normocephalic and atraumatic.      Right Ear: External ear normal.      Left Ear: External ear normal.      Nose: Nose normal.      Mouth/Throat:      Pharynx: No oropharyngeal exudate.   Eyes:      General: No scleral icterus.        Right eye: No discharge.         Left eye: No discharge.      Conjunctiva/sclera: " Conjunctivae normal.      Pupils: Pupils are equal, round, and reactive to light.   Neck:      Thyroid: No thyromegaly.      Vascular: No JVD.      Trachea: No tracheal deviation.   Cardiovascular:      Rate and Rhythm: Normal rate and regular rhythm.      Heart sounds: Normal heart sounds. No murmur heard.    No friction rub. No gallop.   Pulmonary:      Effort: Pulmonary effort is normal. No respiratory distress.      Breath sounds: Normal breath sounds. No stridor. No wheezing or rales.   Chest:      Chest wall: No tenderness.   Abdominal:      General: Bowel sounds are normal. There is no distension.      Palpations: Abdomen is soft. There is no mass.      Tenderness: There is no abdominal tenderness. There is no guarding or rebound.   Musculoskeletal:         General: No tenderness. Normal range of motion.      Cervical back: Normal range of motion and neck supple.   Lymphadenopathy:      Cervical: No cervical adenopathy.   Skin:     General: Skin is warm and dry.      Coloration: Skin is not pale.      Findings: No erythema or rash.   Neurological:      Mental Status: She is alert and oriented to person, place, and time.      Cranial Nerves: No cranial nerve deficit.      Motor: No abnormal muscle tone.      Coordination: Coordination normal.      Deep Tendon Reflexes: Reflexes are normal and symmetric. Reflexes normal.   Psychiatric:         Behavior: Behavior normal.         Thought Content: Thought content normal.         Judgment: Judgment normal.                              Assessment & Plan        1. Essential hypertension-not at goal with BP = 154/104 in both arms sitting reclined at 45 degrees. These are consistent with her outside readings as well as the nurses reading today.    In view of her past history of dissection of the left vertebral artery, aggressive treatment of BP control needs to be instituted. She is advised on salt restriction and to be restarted on ARB ((intolerant of ACE due to  cough).    We will check Jr/renin ratio to evaluate for possible hyperaldosteronism. Does not appear that she has a coarctation since both arms have equal blood pressures and chest x-ray did not show coarctation. Plus there appears to be no evidence of renal artery stenosis in view of her prior CTs of the abdomen.     - RENIN ACTIVITY AND ALDOSTERONE  - losartan (COZAAR) 25 MG Tab; Take 1 Tablet by mouth every day.  Dispense: 90 Tablet; Refill: 1  - losartan (COZAAR) 50 MG Tab; Take 1 Tablet by mouth every day.  Dispense: 90 Tablet; Refill: 90    2. 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 Saldana  As above continue on aspirin 81 mg daily and good BP control.    3. IFG (impaired fasting glucose)  Borderline with FBS = 101. Continue Mediterranean diet. No medication.   4. Dyslipidemia      Mild borderline not severe to warrant medication. Continue with  Good nutrition.

## 2022-03-07 DIAGNOSIS — I10 ESSENTIAL HYPERTENSION: ICD-10-CM

## 2022-03-07 RX ORDER — LOSARTAN POTASSIUM 50 MG/1
50 TABLET ORAL DAILY
Qty: 90 TABLET | Refills: 90 | Status: ON HOLD | OUTPATIENT
Start: 2022-03-07 | End: 2022-03-28

## 2022-03-27 ENCOUNTER — APPOINTMENT (OUTPATIENT)
Dept: RADIOLOGY | Facility: MEDICAL CENTER | Age: 52
End: 2022-03-27
Attending: EMERGENCY MEDICINE
Payer: COMMERCIAL

## 2022-03-27 ENCOUNTER — HOSPITAL ENCOUNTER (OUTPATIENT)
Facility: MEDICAL CENTER | Age: 52
End: 2022-03-28
Attending: EMERGENCY MEDICINE | Admitting: HOSPITALIST
Payer: COMMERCIAL

## 2022-03-27 DIAGNOSIS — R06.02 SHORTNESS OF BREATH: ICD-10-CM

## 2022-03-27 DIAGNOSIS — R65.10 SIRS (SYSTEMIC INFLAMMATORY RESPONSE SYNDROME) (HCC): ICD-10-CM

## 2022-03-27 DIAGNOSIS — J21.0 RSV (ACUTE BRONCHIOLITIS DUE TO RESPIRATORY SYNCYTIAL VIRUS): ICD-10-CM

## 2022-03-27 DIAGNOSIS — J45.40 MODERATE PERSISTENT ASTHMA, UNSPECIFIED WHETHER COMPLICATED: ICD-10-CM

## 2022-03-27 DIAGNOSIS — R07.9 ACUTE CHEST PAIN: ICD-10-CM

## 2022-03-27 DIAGNOSIS — I10 ESSENTIAL HYPERTENSION: ICD-10-CM

## 2022-03-27 DIAGNOSIS — I16.0 HYPERTENSIVE URGENCY: ICD-10-CM

## 2022-03-27 PROBLEM — R94.31 ABNORMAL EKG: Status: ACTIVE | Noted: 2022-03-27

## 2022-03-27 PROBLEM — B33.8 RSV (RESPIRATORY SYNCYTIAL VIRUS INFECTION): Status: ACTIVE | Noted: 2022-03-27

## 2022-03-27 LAB
ALBUMIN SERPL BCP-MCNC: 4.8 G/DL (ref 3.2–4.9)
ALBUMIN/GLOB SERPL: 2 G/DL
ALP SERPL-CCNC: 74 U/L (ref 30–99)
ALT SERPL-CCNC: 24 U/L (ref 2–50)
ANION GAP SERPL CALC-SCNC: 15 MMOL/L (ref 7–16)
AST SERPL-CCNC: 22 U/L (ref 12–45)
BASOPHILS # BLD AUTO: 0.7 % (ref 0–1.8)
BASOPHILS # BLD: 0.07 K/UL (ref 0–0.12)
BILIRUB SERPL-MCNC: 0.3 MG/DL (ref 0.1–1.5)
BUN SERPL-MCNC: 23 MG/DL (ref 8–22)
CALCIUM SERPL-MCNC: 10 MG/DL (ref 8.4–10.2)
CHLORIDE SERPL-SCNC: 99 MMOL/L (ref 96–112)
CO2 SERPL-SCNC: 24 MMOL/L (ref 20–33)
CREAT SERPL-MCNC: 0.76 MG/DL (ref 0.5–1.4)
EKG IMPRESSION: NORMAL
EOSINOPHIL # BLD AUTO: 0.05 K/UL (ref 0–0.51)
EOSINOPHIL NFR BLD: 0.5 % (ref 0–6.9)
ERYTHROCYTE [DISTWIDTH] IN BLOOD BY AUTOMATED COUNT: 39.3 FL (ref 35.9–50)
FLUAV RNA SPEC QL NAA+PROBE: NEGATIVE
FLUBV RNA SPEC QL NAA+PROBE: NEGATIVE
GFR SERPLBLD CREATININE-BSD FMLA CKD-EPI: 96 ML/MIN/1.73 M 2
GLOBULIN SER CALC-MCNC: 2.4 G/DL (ref 1.9–3.5)
GLUCOSE SERPL-MCNC: 118 MG/DL (ref 65–99)
HCT VFR BLD AUTO: 43.5 % (ref 37–47)
HGB BLD-MCNC: 14.7 G/DL (ref 12–16)
IMM GRANULOCYTES # BLD AUTO: 0.16 K/UL (ref 0–0.11)
IMM GRANULOCYTES NFR BLD AUTO: 1.7 % (ref 0–0.9)
LACTATE BLD-SCNC: 3.3 MMOL/L (ref 0.5–2)
LYMPHOCYTES # BLD AUTO: 2.7 K/UL (ref 1–4.8)
LYMPHOCYTES NFR BLD: 28.8 % (ref 22–41)
MCH RBC QN AUTO: 31.6 PG (ref 27–33)
MCHC RBC AUTO-ENTMCNC: 33.8 G/DL (ref 33.6–35)
MCV RBC AUTO: 93.5 FL (ref 81.4–97.8)
MONOCYTES # BLD AUTO: 0.78 K/UL (ref 0–0.85)
MONOCYTES NFR BLD AUTO: 8.3 % (ref 0–13.4)
NEUTROPHILS # BLD AUTO: 5.61 K/UL (ref 2–7.15)
NEUTROPHILS NFR BLD: 60 % (ref 44–72)
NRBC # BLD AUTO: 0 K/UL
NRBC BLD-RTO: 0 /100 WBC
PLATELET # BLD AUTO: 292 K/UL (ref 164–446)
PMV BLD AUTO: 9.5 FL (ref 9–12.9)
POTASSIUM SERPL-SCNC: 3.4 MMOL/L (ref 3.6–5.5)
PROT SERPL-MCNC: 7.2 G/DL (ref 6–8.2)
RBC # BLD AUTO: 4.65 M/UL (ref 4.2–5.4)
RSV RNA SPEC QL NAA+PROBE: POSITIVE
SARS-COV-2 RNA RESP QL NAA+PROBE: NOTDETECTED
SODIUM SERPL-SCNC: 138 MMOL/L (ref 135–145)
SPECIMEN SOURCE: ABNORMAL
TROPONIN T SERPL-MCNC: <6 NG/L (ref 6–19)
WBC # BLD AUTO: 9.4 K/UL (ref 4.8–10.8)

## 2022-03-27 PROCEDURE — 700117 HCHG RX CONTRAST REV CODE 255: Performed by: EMERGENCY MEDICINE

## 2022-03-27 PROCEDURE — 71275 CT ANGIOGRAPHY CHEST: CPT

## 2022-03-27 PROCEDURE — 0241U HCHG SARS-COV-2 COVID-19 NFCT DS RESP RNA 4 TRGT MIC: CPT

## 2022-03-27 PROCEDURE — 36415 COLL VENOUS BLD VENIPUNCTURE: CPT

## 2022-03-27 PROCEDURE — G0378 HOSPITAL OBSERVATION PER HR: HCPCS

## 2022-03-27 PROCEDURE — 99220 PR INITIAL OBSERVATION CARE,LEVL III: CPT | Performed by: HOSPITALIST

## 2022-03-27 PROCEDURE — 700111 HCHG RX REV CODE 636 W/ 250 OVERRIDE (IP): Performed by: HOSPITALIST

## 2022-03-27 PROCEDURE — 700102 HCHG RX REV CODE 250 W/ 637 OVERRIDE(OP): Performed by: HOSPITALIST

## 2022-03-27 PROCEDURE — 99285 EMERGENCY DEPT VISIT HI MDM: CPT

## 2022-03-27 PROCEDURE — 94760 N-INVAS EAR/PLS OXIMETRY 1: CPT

## 2022-03-27 PROCEDURE — 93005 ELECTROCARDIOGRAM TRACING: CPT | Performed by: HOSPITALIST

## 2022-03-27 PROCEDURE — 80053 COMPREHEN METABOLIC PANEL: CPT

## 2022-03-27 PROCEDURE — A9270 NON-COVERED ITEM OR SERVICE: HCPCS | Performed by: HOSPITALIST

## 2022-03-27 PROCEDURE — 83605 ASSAY OF LACTIC ACID: CPT

## 2022-03-27 PROCEDURE — 96374 THER/PROPH/DIAG INJ IV PUSH: CPT

## 2022-03-27 PROCEDURE — 87040 BLOOD CULTURE FOR BACTERIA: CPT

## 2022-03-27 PROCEDURE — 93010 ELECTROCARDIOGRAM REPORT: CPT | Performed by: HOSPITALIST

## 2022-03-27 PROCEDURE — 85025 COMPLETE CBC W/AUTO DIFF WBC: CPT

## 2022-03-27 PROCEDURE — 96375 TX/PRO/DX INJ NEW DRUG ADDON: CPT

## 2022-03-27 PROCEDURE — 700111 HCHG RX REV CODE 636 W/ 250 OVERRIDE (IP): Performed by: EMERGENCY MEDICINE

## 2022-03-27 PROCEDURE — 84484 ASSAY OF TROPONIN QUANT: CPT | Mod: 91

## 2022-03-27 PROCEDURE — C9803 HOPD COVID-19 SPEC COLLECT: HCPCS | Performed by: EMERGENCY MEDICINE

## 2022-03-27 PROCEDURE — 700105 HCHG RX REV CODE 258: Performed by: EMERGENCY MEDICINE

## 2022-03-27 RX ORDER — HYDRALAZINE HYDROCHLORIDE 20 MG/ML
20 INJECTION INTRAMUSCULAR; INTRAVENOUS EVERY 6 HOURS PRN
Status: DISCONTINUED | OUTPATIENT
Start: 2022-03-27 | End: 2022-03-28 | Stop reason: HOSPADM

## 2022-03-27 RX ORDER — BACLOFEN 10 MG/1
10 TABLET ORAL
Status: DISCONTINUED | OUTPATIENT
Start: 2022-03-27 | End: 2022-03-28 | Stop reason: HOSPADM

## 2022-03-27 RX ORDER — BISACODYL 10 MG
10 SUPPOSITORY, RECTAL RECTAL
Status: DISCONTINUED | OUTPATIENT
Start: 2022-03-27 | End: 2022-03-28 | Stop reason: HOSPADM

## 2022-03-27 RX ORDER — KETOROLAC TROMETHAMINE 10 MG/1
10 TABLET, FILM COATED ORAL EVERY 6 HOURS PRN
Status: DISCONTINUED | OUTPATIENT
Start: 2022-03-27 | End: 2022-03-27

## 2022-03-27 RX ORDER — DIPHENHYDRAMINE HCL 25 MG
25 TABLET ORAL EVERY 8 HOURS PRN
Status: DISCONTINUED | OUTPATIENT
Start: 2022-03-27 | End: 2022-03-28 | Stop reason: HOSPADM

## 2022-03-27 RX ORDER — POLYETHYLENE GLYCOL 3350 17 G/17G
1 POWDER, FOR SOLUTION ORAL
Status: DISCONTINUED | OUTPATIENT
Start: 2022-03-27 | End: 2022-03-28 | Stop reason: HOSPADM

## 2022-03-27 RX ORDER — AZITHROMYCIN 500 MG/1
500 INJECTION, POWDER, LYOPHILIZED, FOR SOLUTION INTRAVENOUS ONCE
Status: DISCONTINUED | OUTPATIENT
Start: 2022-03-27 | End: 2022-03-27

## 2022-03-27 RX ORDER — HYDRALAZINE HYDROCHLORIDE 20 MG/ML
20 INJECTION INTRAMUSCULAR; INTRAVENOUS ONCE
Status: COMPLETED | OUTPATIENT
Start: 2022-03-27 | End: 2022-03-27

## 2022-03-27 RX ORDER — OXYCODONE HYDROCHLORIDE 5 MG/1
2.5 TABLET ORAL
Status: DISCONTINUED | OUTPATIENT
Start: 2022-03-27 | End: 2022-03-28 | Stop reason: HOSPADM

## 2022-03-27 RX ORDER — ACETAMINOPHEN 325 MG/1
650 TABLET ORAL EVERY 6 HOURS PRN
Status: DISCONTINUED | OUTPATIENT
Start: 2022-03-27 | End: 2022-03-28 | Stop reason: HOSPADM

## 2022-03-27 RX ORDER — POTASSIUM CHLORIDE 20 MEQ/1
40 TABLET, EXTENDED RELEASE ORAL ONCE
Status: COMPLETED | OUTPATIENT
Start: 2022-03-27 | End: 2022-03-27

## 2022-03-27 RX ORDER — METOCLOPRAMIDE HYDROCHLORIDE 5 MG/ML
10 INJECTION INTRAMUSCULAR; INTRAVENOUS EVERY 6 HOURS PRN
Status: DISPENSED | OUTPATIENT
Start: 2022-03-27 | End: 2022-03-28

## 2022-03-27 RX ORDER — OXYCODONE HYDROCHLORIDE 5 MG/1
5 TABLET ORAL
Status: DISCONTINUED | OUTPATIENT
Start: 2022-03-27 | End: 2022-03-28 | Stop reason: HOSPADM

## 2022-03-27 RX ORDER — AMOXICILLIN 250 MG
2 CAPSULE ORAL 2 TIMES DAILY
Status: DISCONTINUED | OUTPATIENT
Start: 2022-03-27 | End: 2022-03-28 | Stop reason: HOSPADM

## 2022-03-27 RX ORDER — ALBUTEROL SULFATE 90 UG/1
2 AEROSOL, METERED RESPIRATORY (INHALATION) EVERY 6 HOURS PRN
Status: DISCONTINUED | OUTPATIENT
Start: 2022-03-27 | End: 2022-03-28 | Stop reason: HOSPADM

## 2022-03-27 RX ORDER — SODIUM CHLORIDE, SODIUM LACTATE, POTASSIUM CHLORIDE, AND CALCIUM CHLORIDE .6; .31; .03; .02 G/100ML; G/100ML; G/100ML; G/100ML
30 INJECTION, SOLUTION INTRAVENOUS ONCE
Status: ACTIVE | OUTPATIENT
Start: 2022-03-27 | End: 2022-03-28

## 2022-03-27 RX ORDER — AMLODIPINE BESYLATE 5 MG/1
5 TABLET ORAL
Status: DISCONTINUED | OUTPATIENT
Start: 2022-03-27 | End: 2022-03-28 | Stop reason: HOSPADM

## 2022-03-27 RX ORDER — HYDROMORPHONE HYDROCHLORIDE 1 MG/ML
0.25 INJECTION, SOLUTION INTRAMUSCULAR; INTRAVENOUS; SUBCUTANEOUS
Status: DISCONTINUED | OUTPATIENT
Start: 2022-03-27 | End: 2022-03-28

## 2022-03-27 RX ORDER — SODIUM CHLORIDE 9 MG/ML
1000 INJECTION, SOLUTION INTRAVENOUS ONCE
Status: COMPLETED | OUTPATIENT
Start: 2022-03-27 | End: 2022-03-27

## 2022-03-27 RX ORDER — AZITHROMYCIN 250 MG/1
250 TABLET, FILM COATED ORAL DAILY
Status: ON HOLD | COMMUNITY
End: 2022-03-28

## 2022-03-27 RX ORDER — LOSARTAN POTASSIUM 25 MG/1
50 TABLET ORAL DAILY
Status: DISCONTINUED | OUTPATIENT
Start: 2022-03-27 | End: 2022-03-27

## 2022-03-27 RX ORDER — ONDANSETRON 4 MG/1
4 TABLET, ORALLY DISINTEGRATING ORAL EVERY 4 HOURS PRN
Status: DISCONTINUED | OUTPATIENT
Start: 2022-03-27 | End: 2022-03-28 | Stop reason: HOSPADM

## 2022-03-27 RX ORDER — SPIRONOLACTONE 25 MG/1
25 TABLET ORAL
Status: DISCONTINUED | OUTPATIENT
Start: 2022-03-27 | End: 2022-03-28 | Stop reason: HOSPADM

## 2022-03-27 RX ORDER — LABETALOL HYDROCHLORIDE 5 MG/ML
10 INJECTION, SOLUTION INTRAVENOUS EVERY 4 HOURS PRN
Status: DISCONTINUED | OUTPATIENT
Start: 2022-03-27 | End: 2022-03-27

## 2022-03-27 RX ORDER — METHYLPREDNISOLONE 4 MG/1
4 TABLET ORAL DAILY
Status: ON HOLD | COMMUNITY
End: 2022-03-28

## 2022-03-27 RX ORDER — ZOLPIDEM TARTRATE 5 MG/1
5 TABLET ORAL NIGHTLY PRN
Status: DISCONTINUED | OUTPATIENT
Start: 2022-03-27 | End: 2022-03-28 | Stop reason: HOSPADM

## 2022-03-27 RX ORDER — ONDANSETRON 2 MG/ML
4 INJECTION INTRAMUSCULAR; INTRAVENOUS EVERY 4 HOURS PRN
Status: DISCONTINUED | OUTPATIENT
Start: 2022-03-27 | End: 2022-03-28 | Stop reason: HOSPADM

## 2022-03-27 RX ORDER — PREDNISONE 20 MG/1
40 TABLET ORAL DAILY
Status: DISCONTINUED | OUTPATIENT
Start: 2022-03-27 | End: 2022-03-28 | Stop reason: HOSPADM

## 2022-03-27 RX ADMIN — DIPHENHYDRAMINE HCL 25 MG: 25 TABLET ORAL at 16:47

## 2022-03-27 RX ADMIN — IOHEXOL 64 ML: 350 INJECTION, SOLUTION INTRAVENOUS at 11:35

## 2022-03-27 RX ADMIN — POTASSIUM CHLORIDE 40 MEQ: 20 TABLET, EXTENDED RELEASE ORAL at 15:51

## 2022-03-27 RX ADMIN — HYDROCODONE BITARTRATE AND HOMATROPINE METHYLBROMIDE 5 ML: 5; 1.5 SOLUTION ORAL at 16:47

## 2022-03-27 RX ADMIN — METOCLOPRAMIDE 10 MG: 5 INJECTION, SOLUTION INTRAMUSCULAR; INTRAVENOUS at 17:16

## 2022-03-27 RX ADMIN — HYDRALAZINE HYDROCHLORIDE 20 MG: 20 INJECTION INTRAMUSCULAR; INTRAVENOUS at 12:50

## 2022-03-27 RX ADMIN — SODIUM CHLORIDE 1000 ML: 9 INJECTION, SOLUTION INTRAVENOUS at 11:14

## 2022-03-27 RX ADMIN — BACLOFEN 10 MG: 10 TABLET ORAL at 15:51

## 2022-03-27 RX ADMIN — ZOLPIDEM TARTRATE 5 MG: 5 TABLET ORAL at 22:08

## 2022-03-27 RX ADMIN — ONDANSETRON 4 MG: 2 INJECTION INTRAMUSCULAR; INTRAVENOUS at 17:16

## 2022-03-27 RX ADMIN — ASPIRIN 81 MG: 81 TABLET, COATED ORAL at 17:17

## 2022-03-27 RX ADMIN — AMLODIPINE BESYLATE 5 MG: 5 TABLET ORAL at 16:47

## 2022-03-27 RX ADMIN — PREDNISONE 40 MG: 20 TABLET ORAL at 15:50

## 2022-03-27 RX ADMIN — SPIRONOLACTONE 25 MG: 25 TABLET ORAL at 15:52

## 2022-03-27 RX ADMIN — OXYCODONE HYDROCHLORIDE 5 MG: 5 TABLET ORAL at 13:46

## 2022-03-27 ASSESSMENT — LIFESTYLE VARIABLES
HAVE YOU EVER FELT YOU SHOULD CUT DOWN ON YOUR DRINKING: NO
CONSUMPTION TOTAL: NEGATIVE
HAVE PEOPLE ANNOYED YOU BY CRITICIZING YOUR DRINKING: NO
AVERAGE NUMBER OF DAYS PER WEEK YOU HAVE A DRINK CONTAINING ALCOHOL: 1
TOTAL SCORE: 0
EVER HAD A DRINK FIRST THING IN THE MORNING TO STEADY YOUR NERVES TO GET RID OF A HANGOVER: NO
TOTAL SCORE: 0
ALCOHOL_USE: NO
EVER FELT BAD OR GUILTY ABOUT YOUR DRINKING: NO
TOTAL SCORE: 0
ON A TYPICAL DAY WHEN YOU DRINK ALCOHOL HOW MANY DRINKS DO YOU HAVE: 1
HOW MANY TIMES IN THE PAST YEAR HAVE YOU HAD 5 OR MORE DRINKS IN A DAY: 0

## 2022-03-27 ASSESSMENT — COGNITIVE AND FUNCTIONAL STATUS - GENERAL
SUGGESTED CMS G CODE MODIFIER MOBILITY: CH
MOBILITY SCORE: 24
DAILY ACTIVITIY SCORE: 24
SUGGESTED CMS G CODE MODIFIER DAILY ACTIVITY: CH

## 2022-03-27 ASSESSMENT — FIBROSIS 4 INDEX: FIB4 SCORE: 1.26

## 2022-03-27 ASSESSMENT — ENCOUNTER SYMPTOMS
BRUISES/BLEEDS EASILY: 0
VOMITING: 0
STRIDOR: 0
EYE DISCHARGE: 0
MYALGIAS: 0
ABDOMINAL PAIN: 0
NERVOUS/ANXIOUS: 0
SHORTNESS OF BREATH: 1
FEVER: 0
EYE REDNESS: 0
COUGH: 1
FLANK PAIN: 0
CHILLS: 0
FOCAL WEAKNESS: 0

## 2022-03-27 ASSESSMENT — COPD QUESTIONNAIRES
COPD SCREENING SCORE: 0
DURING THE PAST 4 WEEKS HOW MUCH DID YOU FEEL SHORT OF BREATH: NONE/LITTLE OF THE TIME
HAVE YOU SMOKED AT LEAST 100 CIGARETTES IN YOUR ENTIRE LIFE: NO/DON'T KNOW
DO YOU EVER COUGH UP ANY MUCUS OR PHLEGM?: NO/ONLY WITH OCCASIONAL COLDS OR INFECTIONS

## 2022-03-27 ASSESSMENT — PATIENT HEALTH QUESTIONNAIRE - PHQ9
2. FEELING DOWN, DEPRESSED, IRRITABLE, OR HOPELESS: NOT AT ALL
SUM OF ALL RESPONSES TO PHQ9 QUESTIONS 1 AND 2: 0
1. LITTLE INTEREST OR PLEASURE IN DOING THINGS: NOT AT ALL

## 2022-03-27 ASSESSMENT — PAIN DESCRIPTION - PAIN TYPE: TYPE: ACUTE PAIN

## 2022-03-27 NOTE — ED NOTES
IV established with blood draw. Specimens to lab. Patient sitting forward and holding chest. Frequent cough.

## 2022-03-27 NOTE — ED NOTES
Julissa from Lab called with critical result of RSV (+) at 1211. Critical lab result read back to 1211.   Dr. Melvin notified of critical lab result at 1211.  Critical lab result read back by Dr. Melvin.

## 2022-03-27 NOTE — ASSESSMENT & PLAN NOTE
Allergic to ACI  I will start metoprolol and spirolactone   I will start Hydralazine and labetalol for extreme hypertension

## 2022-03-27 NOTE — ED PROVIDER NOTES
ED Provider Note  CHIEF COMPLAINT  Chief Complaint   Patient presents with   • Chest Pain     Chest pain started Thursday. Has had URI and 2 covid tests were negative       HPI  Odalys Ballard is a 51 y.o. female who presents left-sided chest pain.  Patient's symptoms started a few days ago.  Patient's daughter is also ill with a URI.  She has taken 2 Covid tests at home which were negative.  She saw her allergist for these symptoms and was placed on a Z-Satya and she also tried some steroids without relief of her symptoms.  She states her pain is worse with deep breath.  She is also had a productive cough with green sputum and occasionally bloody sputum.  She feels like she has cracked a rib.  She denies exertional chest pain.  She does have a history of asthma and has been using her inhalers more than normal.  She denies any history of PE or blood clots.  She denies any previous cardiac history.  Patient was recently started on blood pressure medication but felt like she had a reaction to it and therefore stopped taking it.  This was about 3 weeks ago.  Patient denies any vomiting or diarrhea.  No dizziness or syncope.  No leg swelling or calf pain.    REVIEW OF SYSTEMS  See HPI for further details. All other systems are negative.     PAST MEDICAL HISTORY  Past Medical History:   Diagnosis Date   • At high risk for breast cancer- prophylactic bilateral mastectomy TBD November 2020 10/1/2020   • Bronchitis 2015   • Pneumonia 2010   • Anesthesia     PONV    • ASTHMA     prn inhaler   • Heart burn    • Hiatus hernia syndrome    • Hypertension    • Other specified symptom associated with female genital organs     endometriosis, fibroids       FAMILY HISTORY  [unfilled]    SOCIAL HISTORY  Social History     Socioeconomic History   • Marital status:    Tobacco Use   • Smoking status: Never Smoker   • Smokeless tobacco: Never Used   Vaping Use   • Vaping Use: Never used   Substance and Sexual Activity   • Alcohol use:  Yes     Comment: 2 per week   • Drug use: No       SURGICAL HISTORY  Past Surgical History:   Procedure Laterality Date   • BREAST RECONSTRUCTION Bilateral 2/11/2021    Procedure: RECONSTRUCTION, BREAST - AND REVISION WITH DERMAL GLANDULAR FLAPS, CAPSULECTOMY;  Surgeon: Amos Dang M.D.;  Location: SURGERY SAME DAY [facility] [place];  Service: Plastics   • EXCISION,FAT GRAFT  2/11/2021    Procedure: EXCISION, FAT GRAFT- FOR FAT GRAFTING;  Surgeon: Amos Dang M.D.;  Location: SURGERY SAME DAY [facility] [place];  Service: Plastics   • TISSUE EXPANDER PLACE/REMOVE Bilateral 2/11/2021    Procedure: INSERTION OR REMOVAL, TISSUE EXPANDER;  Surgeon: Amos Dang M.D.;  Location: SURGERY SAME DAY [facility] [place];  Service: Plastics   • BREAST IMPLANT REVISION Bilateral 2/11/2021    Procedure: INSERTION, IMPLANT, BREAST;  Surgeon: Amos Dang M.D.;  Location: SURGERY SAME DAY [facility] [place];  Service: Plastics   • LIPOSUCTION  2/11/2021    Procedure: LIPOSUCTION-ABDOMEN, FLANKS, HIPS OUTTER THIGHS, AND UPPER BACK;  Surgeon: Amos Dang M.D.;  Location: SURGERY SAME DAY [facility] [place];  Service: Plastics   • PB MASTECTOMY, SIMPLE, COMPLETE Bilateral 11/5/2020    Procedure: MASTECTOMY;  Surgeon: Franny Felix M.D.;  Location: [facility];  Service: General   • BREAST RECONSTRUCTION Bilateral 11/5/2020    Procedure: RECONSTRUCTION, BREAST;  Surgeon: Amos Dang M.D.;  Location: [facility];  Service: Plastics   • TISSUE EXPANDER PLACE/REMOVE Bilateral 11/5/2020    Procedure: TISSUE EXPANDER- FOR PLACEMENT  AND USE OF ACELLULAR DERMAL MATRIX;  Surgeon: Amos Dang M.D.;  Location: [facility];  Service: Plastics   • HYSTERECTOMY ROBOTIC N/A 10/17/2016    Procedure: HYSTERECTOMY ROBOTIC, BILATERAL SALPINGECTOMY;  Surgeon: Yamilet Trammell M.D.;  Location: [facility];  Service:    • CYSTOSCOPY N/A 10/17/2016    Procedure: CYSTOSCOPY;  Surgeon: Yamilet Trammell M.D.;   "Location: SURGERY [facility];  Service:    • PELVISCOPY  9/6/2013    Performed by Spencer Matthews M.D. at SURGERY [facility]   • CHOLECYSTECTOMY  2009    laparoscopic   • LAPAROSCOPY  2000, 2008       CURRENT MEDICATIONS   Home Medications    **Home medications have not yet been reviewed for this encounter**         ALLERGIES  Allergies   Allergen Reactions   • Lisinopril Cough       PHYSICAL EXAM  VITAL SIGNS: BP (!) 220/121   Pulse 91   Temp 37 °C (98.6 °F) (Temporal)   Resp 19   Ht 1.702 m (5' 7\")   Wt 67.8 kg (149 lb 7.6 oz)   LMP 10/08/2016   SpO2 100%   BMI 23.41 kg/m²       Constitutional: Well developed, mild acute distress, holding her left rib non-toxic appearance.   HENT: Normocephalic, Atraumatic, Bilateral external ears normal,  Nose normal.   Eyes: PERRL, EOMI, Conjunctiva normal  Neck: Normal range of motion, No tenderness, Supple, No stridor.   Cardiovascular: tachycardic heart rate, Normal rhythm,  Thorax & Lungs: coarse breath sounds on left, mild respiratory distress,  No wheezing, No chest tenderness.  Productive cough.  Abdomen: Benign abdominal exam, no guarding no rebound,  no tenderness, no distention  Skin: Warm, Dry, No erythema, No rash.   Back: No tenderness, No CVA tenderness.   Extremities: Intact distal pulses, No edema, No tenderness   Neurologic: Alert & oriented x 3, Normal motor function, Normal sensory function, No focal deficits noted.   Psychiatric: appropriate    Labs  Results for orders placed or performed during the hospital encounter of 03/27/22   CBC w/ Differential   Result Value Ref Range    WBC 9.4 4.8 - 10.8 K/uL    RBC 4.65 4.20 - 5.40 M/uL    Hemoglobin 14.7 12.0 - 16.0 g/dL    Hematocrit 43.5 37.0 - 47.0 %    MCV 93.5 81.4 - 97.8 fL    MCH 31.6 27.0 - 33.0 pg    MCHC 33.8 33.6 - 35.0 g/dL    RDW 39.3 35.9 - 50.0 fL    Platelet Count 292 164 - 446 K/uL    MPV 9.5 9.0 - 12.9 fL    Neutrophils-Polys 60.00 44.00 - 72.00 %    Lymphocytes 28.80 22.00 - " 41.00 %    Monocytes 8.30 0.00 - 13.40 %    Eosinophils 0.50 0.00 - 6.90 %    Basophils 0.70 0.00 - 1.80 %    Immature Granulocytes 1.70 (H) 0.00 - 0.90 %    Nucleated RBC 0.00 /100 WBC    Neutrophils (Absolute) 5.61 2.00 - 7.15 K/uL    Lymphs (Absolute) 2.70 1.00 - 4.80 K/uL    Monos (Absolute) 0.78 0.00 - 0.85 K/uL    Eos (Absolute) 0.05 0.00 - 0.51 K/uL    Baso (Absolute) 0.07 0.00 - 0.12 K/uL    Immature Granulocytes (abs) 0.16 (H) 0.00 - 0.11 K/uL    NRBC (Absolute) 0.00 K/uL   Complete Metabolic Panel (CMP)   Result Value Ref Range    Sodium 138 135 - 145 mmol/L    Potassium 3.4 (L) 3.6 - 5.5 mmol/L    Chloride 99 96 - 112 mmol/L    Co2 24 20 - 33 mmol/L    Anion Gap 15.0 7.0 - 16.0    Glucose 118 (H) 65 - 99 mg/dL    Bun 23 (H) 8 - 22 mg/dL    Creatinine 0.76 0.50 - 1.40 mg/dL    Calcium 10.0 8.4 - 10.2 mg/dL    AST(SGOT) 22 12 - 45 U/L    ALT(SGPT) 24 2 - 50 U/L    Alkaline Phosphatase 74 30 - 99 U/L    Total Bilirubin 0.3 0.1 - 1.5 mg/dL    Albumin 4.8 3.2 - 4.9 g/dL    Total Protein 7.2 6.0 - 8.2 g/dL    Globulin 2.4 1.9 - 3.5 g/dL    A-G Ratio 2.0 g/dL   Troponin STAT   Result Value Ref Range    Troponin T <6 6 - 19 ng/L   CoV-2, FLU A/B, and RSV by PCR (2-4 Hours CEPHEID) : Collect NP swab in VTM    Specimen: Respirate   Result Value Ref Range    Influenza virus A RNA Negative Negative    Influenza virus B, PCR Negative Negative    RSV, PCR POSITIVE (A) Negative    SARS-CoV-2 by PCR NotDetected     SARS-CoV-2 Source Nasal Swab    LACTIC ACID   Result Value Ref Range    Lactic Acid 3.3 (H) 0.5 - 2.0 mmol/L   ESTIMATED GFR   Result Value Ref Range    GFR (CKD-EPI) 96 >60 mL/min/1.73 m 2   EKG in four (4) hours   Result Value Ref Range    Report       [facility] Emergency Dept.    Test Date:  2022-03-27  Pt Name:    RONDA GARCIA               Department: [facility]  MRN:        [id number]                      Room:       -ROOM 9  Gender:     Female                       Technician:    :        1970                   Requested By:CANDELARIA DORAN SAMY  Order #:    [id number]                    Reading MD: Francesca Churchill MD    Measurements  Intervals                                Axis  Rate:       95                           P:          78  NM:         144                          QRS:        63  QRSD:       103                          T:          -24  QT:         334  QTc:        420    Interpretive Statements  Sinus rhythm  Probable anteroseptal infarct, old  Nonspecific repol abnormality, diffuse leads  Compared to ECG 10/10/2016 15:44:35  Myocardial infarct finding now present  Early repolarization now present  Electronically Signed On 3- 13:12:04 PDT by Francesca Churchill MD         RADIOLOGY/PROCEDURES  CT-CTA CHEST PULMONARY ARTERY W/ RECONS   Final Result      1.  No central or segmental pulmonary embolus is identified.   2.  No acute cardiopulmonary process is seen.                   COURSE & MEDICAL DECISION MAKING  Pertinent Labs & Imaging studies reviewed. (See chart for details)  Patient presents to the emergency department with left-sided chest pain.  She also has quite elevated blood pressure and is tachycardic.  She is not currently hypoxic here any respiratory distress.  She is afebrile here.  Recent history of URI with productive cough with bloody green sputum.  No leg swelling or calf pain.  Differential includes possible COVID versus pneumonia versus PE.  History of asthma but I do not hear any wheezing on exam.    EKG shows diffuse ST changes in the inferior lateral leads.  There is also evidence of LVH.  Patient has quite high blood pressure here in the ER and it sounds like that is been the case for the last several weeks.  She states she was allergic to the blood pressure medicine her primary care started her on.  Initial troponin was normal.  CT of the chest shows no evidence of PE.  She is positive for RSV.  However due to her EKG changes I do not feel  she stable for outpatient management.  Patient will be hospitalized for further evaluation of her heart.    Discussed the case with the hospitalist who will see the patient.  Patient has been treated with hydralazine.    Discussed with Dr. Mendoza.    Patient will be hospitalized in guarded condition.    FINAL IMPRESSION     1. Acute chest pain    2. RSV (acute bronchiolitis due to respiratory syncytial virus)    3. Moderate persistent asthma, unspecified whether complicated    4. SIRS (systemic inflammatory response syndrome) (HCC)    5. Hypertensive urgency             Electronically signed by: Francesca Melvin M.D., 3/27/2022 10:48 AM

## 2022-03-27 NOTE — ASSESSMENT & PLAN NOTE
EKG, sinus tachycardia with a rate of 95, there is T wave inversion and ST depression in leads I, II, III, aVF, leads V5-6.  Is also associated high voltage in those leads consistent with .  LVH likely secondary to untreated hypertension.  Starting metoprolol and spironolactone, allergic to ACI/ARBS  Initial Troponin not elevated. I will trend  Will check an Echo

## 2022-03-27 NOTE — ASSESSMENT & PLAN NOTE
Likely multifactorial, RSV infection with a reactive airway disease.  Hypertensive urgency, will check echo to rule out heart failure  I will start a course of Prednisone  Oxygen as needed, Respiratory protocol, Bronchodilators, Incentive spirometry

## 2022-03-27 NOTE — ASSESSMENT & PLAN NOTE
EKG, sinus tachycardia with a rate of 95, there is T wave inversion and ST depression in leads I, II, III, aVF, leads V5-6.  Is also associated high voltage in those leads consistent with .  Initial Troponin not elevated. I will trend  Stat EKG and troponin for recurrence of chest pain.   Continuous cardiac monitoring.  Will check an Echo   Chest pain is likely 2/2 pleurisy from RSV  NSAID

## 2022-03-27 NOTE — ED NOTES
Med rec updated and complete, per pt   Allergies reviewed, per pt   Interviewed pt with  at bedside with permission from pt.

## 2022-03-27 NOTE — ASSESSMENT & PLAN NOTE
With a reactive airway disease.  I will start a course of Prednisone  Oxygen as needed, Respiratory protocol, Bronchodilators, Incentive spirometry

## 2022-03-27 NOTE — FLOWSHEET NOTE
03/27/22 1520   Vital Signs   Pulse (!) 112   Respiration (!) 24   Pulse Oximetry 98 %   $ Pulse Oximetry (Spot Check) Yes   Chest Exam   Work Of Breathing / Effort Mild   Breath Sounds   RUL Breath Sounds Clear;Diminished   RML Breath Sounds Clear;Diminished   RLL Breath Sounds Diminished   BRIGID Breath Sounds Clear   LLL Breath Sounds Clear   Secretions   Cough Strong;Dry;Non Productive   Oxygen   O2 Delivery Device Room air w/o2 available

## 2022-03-28 ENCOUNTER — PHARMACY VISIT (OUTPATIENT)
Dept: PHARMACY | Facility: MEDICAL CENTER | Age: 52
End: 2022-03-28
Payer: COMMERCIAL

## 2022-03-28 ENCOUNTER — APPOINTMENT (OUTPATIENT)
Dept: CARDIOLOGY | Facility: MEDICAL CENTER | Age: 52
End: 2022-03-28
Attending: STUDENT IN AN ORGANIZED HEALTH CARE EDUCATION/TRAINING PROGRAM
Payer: COMMERCIAL

## 2022-03-28 VITALS
SYSTOLIC BLOOD PRESSURE: 164 MMHG | HEIGHT: 67 IN | OXYGEN SATURATION: 97 % | DIASTOLIC BLOOD PRESSURE: 90 MMHG | TEMPERATURE: 98.1 F | RESPIRATION RATE: 18 BRPM | BODY MASS INDEX: 23.46 KG/M2 | WEIGHT: 149.47 LBS | HEART RATE: 86 BPM

## 2022-03-28 LAB
ALBUMIN SERPL BCP-MCNC: 4.6 G/DL (ref 3.2–4.9)
ALBUMIN/GLOB SERPL: 1.7 G/DL
ALP SERPL-CCNC: 69 U/L (ref 30–99)
ALT SERPL-CCNC: 25 U/L (ref 2–50)
ANION GAP SERPL CALC-SCNC: 15 MMOL/L (ref 7–16)
AST SERPL-CCNC: 29 U/L (ref 12–45)
BASOPHILS # BLD AUTO: 0.3 % (ref 0–1.8)
BASOPHILS # BLD: 0.03 K/UL (ref 0–0.12)
BILIRUB SERPL-MCNC: 0.4 MG/DL (ref 0.1–1.5)
BUN SERPL-MCNC: 17 MG/DL (ref 8–22)
CALCIUM SERPL-MCNC: 9.7 MG/DL (ref 8.4–10.2)
CHLORIDE SERPL-SCNC: 102 MMOL/L (ref 96–112)
CO2 SERPL-SCNC: 23 MMOL/L (ref 20–33)
CREAT SERPL-MCNC: 0.85 MG/DL (ref 0.5–1.4)
EKG IMPRESSION: NORMAL
EOSINOPHIL # BLD AUTO: 0 K/UL (ref 0–0.51)
EOSINOPHIL NFR BLD: 0 % (ref 0–6.9)
ERYTHROCYTE [DISTWIDTH] IN BLOOD BY AUTOMATED COUNT: 39.7 FL (ref 35.9–50)
GFR SERPLBLD CREATININE-BSD FMLA CKD-EPI: 84 ML/MIN/1.73 M 2
GLOBULIN SER CALC-MCNC: 2.7 G/DL (ref 1.9–3.5)
GLUCOSE SERPL-MCNC: 115 MG/DL (ref 65–99)
HCT VFR BLD AUTO: 42.8 % (ref 37–47)
HGB BLD-MCNC: 14.7 G/DL (ref 12–16)
IMM GRANULOCYTES # BLD AUTO: 0.07 K/UL (ref 0–0.11)
IMM GRANULOCYTES NFR BLD AUTO: 0.7 % (ref 0–0.9)
LV EJECT FRACT  99904: 60
LV EJECT FRACT MOD 2C 99903: 57.62
LV EJECT FRACT MOD 4C 99902: 61.12
LV EJECT FRACT MOD BP 99901: 60.08
LYMPHOCYTES # BLD AUTO: 1.59 K/UL (ref 1–4.8)
LYMPHOCYTES NFR BLD: 15.9 % (ref 22–41)
MAGNESIUM SERPL-MCNC: 2.3 MG/DL (ref 1.5–2.5)
MCH RBC QN AUTO: 31.7 PG (ref 27–33)
MCHC RBC AUTO-ENTMCNC: 34.3 G/DL (ref 33.6–35)
MCV RBC AUTO: 92.2 FL (ref 81.4–97.8)
MONOCYTES # BLD AUTO: 0.88 K/UL (ref 0–0.85)
MONOCYTES NFR BLD AUTO: 8.8 % (ref 0–13.4)
NEUTROPHILS # BLD AUTO: 7.41 K/UL (ref 2–7.15)
NEUTROPHILS NFR BLD: 74.3 % (ref 44–72)
NRBC # BLD AUTO: 0 K/UL
NRBC BLD-RTO: 0 /100 WBC
PLATELET # BLD AUTO: 303 K/UL (ref 164–446)
PMV BLD AUTO: 9.9 FL (ref 9–12.9)
POTASSIUM SERPL-SCNC: 4.3 MMOL/L (ref 3.6–5.5)
PROT SERPL-MCNC: 7.3 G/DL (ref 6–8.2)
RBC # BLD AUTO: 4.64 M/UL (ref 4.2–5.4)
SODIUM SERPL-SCNC: 140 MMOL/L (ref 135–145)
WBC # BLD AUTO: 10 K/UL (ref 4.8–10.8)

## 2022-03-28 PROCEDURE — 93306 TTE W/DOPPLER COMPLETE: CPT

## 2022-03-28 PROCEDURE — 700102 HCHG RX REV CODE 250 W/ 637 OVERRIDE(OP): Performed by: STUDENT IN AN ORGANIZED HEALTH CARE EDUCATION/TRAINING PROGRAM

## 2022-03-28 PROCEDURE — 94640 AIRWAY INHALATION TREATMENT: CPT

## 2022-03-28 PROCEDURE — 700102 HCHG RX REV CODE 250 W/ 637 OVERRIDE(OP): Performed by: HOSPITALIST

## 2022-03-28 PROCEDURE — G0378 HOSPITAL OBSERVATION PER HR: HCPCS

## 2022-03-28 PROCEDURE — 93005 ELECTROCARDIOGRAM TRACING: CPT | Performed by: STUDENT IN AN ORGANIZED HEALTH CARE EDUCATION/TRAINING PROGRAM

## 2022-03-28 PROCEDURE — 80053 COMPREHEN METABOLIC PANEL: CPT

## 2022-03-28 PROCEDURE — 93306 TTE W/DOPPLER COMPLETE: CPT | Mod: 26 | Performed by: INTERNAL MEDICINE

## 2022-03-28 PROCEDURE — 700111 HCHG RX REV CODE 636 W/ 250 OVERRIDE (IP): Performed by: HOSPITALIST

## 2022-03-28 PROCEDURE — 93010 ELECTROCARDIOGRAM REPORT: CPT | Performed by: INTERNAL MEDICINE

## 2022-03-28 PROCEDURE — A9270 NON-COVERED ITEM OR SERVICE: HCPCS | Performed by: HOSPITALIST

## 2022-03-28 PROCEDURE — 99217 PR OBSERVATION CARE DISCHARGE: CPT | Performed by: STUDENT IN AN ORGANIZED HEALTH CARE EDUCATION/TRAINING PROGRAM

## 2022-03-28 PROCEDURE — 85025 COMPLETE CBC W/AUTO DIFF WBC: CPT

## 2022-03-28 PROCEDURE — A9270 NON-COVERED ITEM OR SERVICE: HCPCS | Performed by: STUDENT IN AN ORGANIZED HEALTH CARE EDUCATION/TRAINING PROGRAM

## 2022-03-28 PROCEDURE — RXMED WILLOW AMBULATORY MEDICATION CHARGE: Performed by: STUDENT IN AN ORGANIZED HEALTH CARE EDUCATION/TRAINING PROGRAM

## 2022-03-28 PROCEDURE — 83735 ASSAY OF MAGNESIUM: CPT

## 2022-03-28 PROCEDURE — 96372 THER/PROPH/DIAG INJ SC/IM: CPT

## 2022-03-28 RX ORDER — BENZONATATE 100 MG/1
100 CAPSULE ORAL 3 TIMES DAILY PRN
Qty: 60 CAPSULE | Refills: 0 | Status: SHIPPED | OUTPATIENT
Start: 2022-03-28 | End: 2022-04-18

## 2022-03-28 RX ORDER — BUDESONIDE AND FORMOTEROL FUMARATE DIHYDRATE 160; 4.5 UG/1; UG/1
2 AEROSOL RESPIRATORY (INHALATION)
Status: DISCONTINUED | OUTPATIENT
Start: 2022-03-28 | End: 2022-03-28 | Stop reason: HOSPADM

## 2022-03-28 RX ORDER — BENZONATATE 100 MG/1
100 CAPSULE ORAL 3 TIMES DAILY PRN
Status: DISCONTINUED | OUTPATIENT
Start: 2022-03-28 | End: 2022-03-28 | Stop reason: HOSPADM

## 2022-03-28 RX ORDER — GUAIFENESIN 600 MG/1
600 TABLET, EXTENDED RELEASE ORAL EVERY 12 HOURS
Status: DISCONTINUED | OUTPATIENT
Start: 2022-03-28 | End: 2022-03-28 | Stop reason: HOSPADM

## 2022-03-28 RX ORDER — PREDNISONE 20 MG/1
40 TABLET ORAL DAILY
Qty: 6 TABLET | Refills: 0 | Status: SHIPPED | OUTPATIENT
Start: 2022-03-28 | End: 2022-03-31

## 2022-03-28 RX ORDER — BUDESONIDE AND FORMOTEROL FUMARATE DIHYDRATE 160; 4.5 UG/1; UG/1
2 AEROSOL RESPIRATORY (INHALATION) 2 TIMES DAILY
Qty: 10.2 G | Refills: 0 | Status: SHIPPED | OUTPATIENT
Start: 2022-03-28 | End: 2022-04-27

## 2022-03-28 RX ORDER — BENZONATATE 100 MG/1
100 CAPSULE ORAL 3 TIMES DAILY PRN
Qty: 60 CAPSULE | Refills: 0 | Status: CANCELLED | OUTPATIENT
Start: 2022-03-28 | End: 2022-04-27

## 2022-03-28 RX ORDER — HYDROCHLOROTHIAZIDE 25 MG/1
25 TABLET ORAL DAILY
Qty: 30 TABLET | Refills: 0 | Status: SHIPPED | OUTPATIENT
Start: 2022-03-28 | End: 2022-04-18 | Stop reason: SDUPTHER

## 2022-03-28 RX ORDER — GUAIFENESIN 600 MG/1
600 TABLET, EXTENDED RELEASE ORAL EVERY 12 HOURS
Qty: 60 TABLET | Refills: 0 | Status: CANCELLED | OUTPATIENT
Start: 2022-03-28 | End: 2022-04-27

## 2022-03-28 RX ADMIN — BENZONATATE 100 MG: 100 CAPSULE ORAL at 11:53

## 2022-03-28 RX ADMIN — BUDESONIDE AND FORMOTEROL FUMARATE DIHYDRATE 2 PUFF: 160; 4.5 AEROSOL RESPIRATORY (INHALATION) at 10:57

## 2022-03-28 RX ADMIN — ONDANSETRON 4 MG: 4 TABLET, ORALLY DISINTEGRATING ORAL at 06:38

## 2022-03-28 RX ADMIN — HYDROCODONE BITARTRATE AND HOMATROPINE METHYLBROMIDE 5 ML: 5; 1.5 SOLUTION ORAL at 06:40

## 2022-03-28 RX ADMIN — BACLOFEN 10 MG: 10 TABLET ORAL at 15:18

## 2022-03-28 RX ADMIN — ENOXAPARIN SODIUM 40 MG: 40 INJECTION SUBCUTANEOUS at 06:40

## 2022-03-28 RX ADMIN — ONDANSETRON 4 MG: 4 TABLET, ORALLY DISINTEGRATING ORAL at 15:19

## 2022-03-28 RX ADMIN — SPIRONOLACTONE 25 MG: 25 TABLET ORAL at 06:41

## 2022-03-28 RX ADMIN — PREDNISONE 40 MG: 20 TABLET ORAL at 10:22

## 2022-03-28 RX ADMIN — SENNOSIDES AND DOCUSATE SODIUM 2 TABLET: 50; 8.6 TABLET ORAL at 06:40

## 2022-03-28 RX ADMIN — GUAIFENESIN 600 MG: 600 TABLET, EXTENDED RELEASE ORAL at 10:19

## 2022-03-28 NOTE — PROGRESS NOTES
Telemetry Shift Summary    Rhythm SR - ST  HR Range 67 - 112  Ectopy rPVCs & rPACs  Measurements 0.14/0.08/0.40        Normal Values  Rhythm SR  HR Range    Measurements 0.12-0.20 / 0.06-0.10  / 0.30-0.52

## 2022-03-28 NOTE — DISCHARGE SUMMARY
Discharge Summary    CHIEF COMPLAINT ON ADMISSION  Chief Complaint   Patient presents with   • Chest Pain     Chest pain started Thursday. Has had URI and 2 covid tests were negative       Reason for Admission  Chest Pain     Admission Date  3/27/2022    CODE STATUS  Full Code    HPI & HOSPITAL COURSE  This is a 51 y.o. female with medical history of hypertension who presented 3/27/2022 with chest pain.  Pain is on the left side of the chest wall described as squeezing, stabbing in nature.  Worse with deep breathing.  Worse with movement.  Rated as moderate-severe.  Also has shortness of breath and cough. Patient's daughter recently diagnosed with an upper respiratory tract infection.    She is tested positive for RSV. COVID negative. CTA no PE. Echo shows EF 60%, normal right ventricular size and systolic function. Troponin negative.   She is started with prednisone 40mg daily for 5 days, symbicort and supportive managements.   Regards HTN, patient reports not tolerating home meds [place] and would like to prescribe HCTZ, which she took it in the past. HCTZ 30 days supply was prescribed.     MED to bed was set up.     Therefore, she is discharged in good and stable condition to home with close outpatient follow-up. She is advised to follow up with PCP in one week.     The patient recovered much more quickly than anticipated on admission.    Discharge Date  3/28/2022    FOLLOW UP ITEMS POST DISCHARGE  PCP    DISCHARGE DIAGNOSES  Principal Problem:    Chest pain POA: Yes  Active Problems:    Shortness of breath POA: Yes    RSV (respiratory syncytial virus infection) POA: Yes    Abnormal EKG POA: Yes    Hypertensive urgency POA: Yes  Resolved Problems:    * No resolved hospital problems. *      FOLLOW UP  Future Appointments   Date Time Provider Department Center   4/18/2022 11:00 AM Errol Moralez M.D. [id number] [id number] [phone]    In 1  week        MEDICATIONS ON DISCHARGE     Medication List      START taking these medications      Instructions   benzonatate 100 MG Caps  Commonly known as: TESSALON   Take 1 Capsule by mouth 3 times a day as needed for Cough.  Dose: 100 mg     hydroCHLOROthiazide 25 MG Tabs  Commonly known as: HYDRODIURIL   Take 1 Tablet by mouth every day for 30 days.  Dose: 25 mg     predniSONE 20 MG Tabs  Commonly known as: DELTASONE   Take 2 Tablets by mouth every day for 3 days.  Dose: 40 mg     Symbicort 160-4.5 MCG/ACT Aero  Generic drug: budesonide-formoterol   Inhale 2 Puffs 2 times a day for 30 days.  Dose: 2 Puff        CHANGE how you take these medications      Instructions   baclofen 10 MG Tabs  What changed: when to take this  Commonly known as: LIORESAL   TAKE 1 TABLET BY MOUTH ONCE DAILY AS NEEDED     rizatriptan 10 MG tablet  What changed: See the new instructions.  Commonly known as: MAXALT   TAKE 1 TABLET BY MOUTH ONCE DAILY AS NEEDED FOR MIGRAINE HEADACHE        CONTINUE taking these medications      Instructions   AFRIN ALLERGY NA   Administer 1-2 Sprays into affected nostril(S) 2 times a day as needed (For congestion).  Dose: 1-2 Spray     albuterol 108 (90 Base) MCG/ACT Aers inhalation aerosol   Inhale 2 Puffs by mouth every 6 hours as needed for Shortness of Breath.  Dose: 2 Puff     aspirin EC 81 MG Tbec  Commonly known as: ECOTRIN   Take 81 mg by mouth every evening.  Dose: 81 mg     Eszopiclone 3 MG Tabs   Take 3 mg by mouth every evening.  Dose: 3 mg     ROBITUSSIN CF PO   Take 20 mL by mouth 2 times a day as needed (For cough).  Dose: 20 mL        STOP taking these medications    azithromycin 250 MG Tabs  Commonly known as: ZITHROMAX     losartan 50 MG Tabs  Commonly known as: COZAAR     methylPREDNISolone 4 MG Tbpk  Commonly known as: MEDROL DOSEPAK            Allergies  Allergies   Allergen Reactions   • Lisinopril Cough   • Losartan Cough     Per pt reports that she had bad congestion         DIET  Orders Placed This Encounter   Procedures   • Diet Order Diet: Cardiac; Miscellaneous modifications: (optional): No Decaf, No Caffeine(for test)     Standing Status:   Standing     Number of Occurrences:   1     Order Specific Question:   Diet:     Answer:   Cardiac [6]     Order Specific Question:   Miscellaneous modifications: (optional)     Answer:   No Decaf, No Caffeine(for test) [11]       ACTIVITY  As tolerated.  Weight bearing as tolerated    CONSULTATIONS  none    PROCEDURES  none    LABORATORY  Lab Results   Component Value Date    SODIUM 140 03/28/2022    POTASSIUM 4.3 03/28/2022    CHLORIDE 102 03/28/2022    CO2 23 03/28/2022    GLUCOSE 115 (H) 03/28/2022    BUN 17 03/28/2022    CREATININE 0.85 03/28/2022        Lab Results   Component Value Date    WBC 10.0 03/28/2022    HEMOGLOBIN 14.7 03/28/2022    HEMATOCRIT 42.8 03/28/2022    PLATELETCT 303 03/28/2022      EC-ECHOCARDIOGRAM COMPLETE W/O CONT   Final Result      CT-CTA CHEST PULMONARY ARTERY W/ RECONS   Final Result      1.  No central or segmental pulmonary embolus is identified.   2.  No acute cardiopulmonary process is seen.                   Total time of the discharge process exceeds 29 minutes.

## 2022-03-28 NOTE — PROGRESS NOTES
Meds 2 bed delivered about 1500.  Echo came back normal.  Discharging Patient home per physician order.  Discharged with  to home at 1615.  Demonstrated understanding of discharge instructions, follow up appointments, home medications, prescriptions sent to [facility], and nursing care instructions for low salt and managing htn.  Ambulating without assistance, voiding without difficulty, pain well controlled, tolerating oral medications, oxygen saturation greater than 90% , tolerating diet.   Educational handouts given and discussed.  Verbalized understanding of discharge instructions and educational handouts.  All questions answered.  Belongings with patient at time of discharge. Pt was sent home with mask in place.

## 2022-03-28 NOTE — PROGRESS NOTES
4 Eyes Skin Assessment Completed by AMALIA Adame and AMALIA White.    Head WDL  Ears WDL  Nose WDL  Mouth WDL  Neck WDL  Breast/Chest Redness  Shoulder Blades WDL  Spine WDL  (R) Arm/Elbow/Hand WDL  (L) Arm/Elbow/Hand WDL  Abdomen WDL  Groin WDL  Scrotum/Coccyx/Buttocks WDL  (R) Leg WDL  (L) Leg WDL  (R) Heel/Foot/Toe WDL  (L) Heel/Foot/Toe WDL          Devices In Places Tele Box      Interventions In Place Pillows and Low Air Loss Mattress    Possible Skin Injury No    Pictures Uploaded Into Epic N/A  Wound Consult Placed N/A  RN Wound Prevention Protocol Ordered No

## 2022-03-28 NOTE — PROGRESS NOTES
Report received from marivel Giron in droplet precautions.    Morning assessment done about 0745.  Pt states that she feels better and the coughing is less today.  Discussed POC and what she had tried for the cough.    Follow-up with hospitalist for an order for the echo and repeat ekg after reviewing her tele monitoring.  Also orders for mucinex and prn for cough.  First dose of mucinex given and pt states she was ready to try the prednisone as she had eaten her breakfast.      Echo at bedside about 1130.  When they finished gave her first prn of tessalon pearls.  Also stated education on managing high blood pressure, taking bp 2x/day and f/u with pcp.

## 2022-03-28 NOTE — CARE PLAN
The patient is Stable - Low risk of patient condition declining or worsening    Shift Goals  Clinical Goals: Echo, monitor TRoponins, and PRN EKG as ordered per MD  Patient Goals: Sleep and chest pain relieve    Progress made toward(s) clinical / shift goals:  Awaiting echo to be ordered per doctors note, x3 negative troponins drawn and resulted, PRN EKG available for any changes in chest pain.     Patient is not progressing towards the following goals:      Problem: Pain - Standard  Goal: Alleviation of pain or a reduction in pain to the patient’s comfort goal  Outcome: Progressing  Note: Patient's pain being controlled with the implementation of nursing interventions at this time.      Problem: Knowledge Deficit - Standard  Goal: Patient and family/care givers will demonstrate understanding of plan of care, disease process/condition, diagnostic tests and medications  Outcome: Progressing  Note: Patient demonstrated verbal understanding of education provided. Reinforcement required.

## 2022-03-28 NOTE — DISCHARGE PLANNING
Anticipated Discharge Disposition: Home      Action: CORNELIUS RN completed chart review. Pt on RA, 6 clicks score of 24/24/ Pt has discharge order place. Anticipate no needs at discharge.    Barriers to Discharge: Medical Clearance     Plan: Case management to follow up medical team with any other needs

## 2022-03-28 NOTE — DISCHARGE INSTRUCTIONS
Discharge Instructions per Whitney Mills M.D.    Please follow-up with PCP as outpatient.  Please take medications as instructed.     DIET: cardiac diet    ACTIVITY: As tolerated    DIAGNOSIS: Pleuritic chest pain, RSV infection, sob    Return to ER in the event of new or worsening symptoms. Please note importance of compliance and the patient has agreed to proceed with all medical recommendations and follow up plan indicated above. All medications come with benefits and risks. Risks may include permanent injury or death and these risks can be minimized with close reassessment and monitoring. Please make it to your scheduled follow ups with PCP    Discharge Instructions    Discharged to home by wheelchair with  Discharged via nursing staff, hospital escort: yes.  Special equipment needed: none    Be sure to schedule a follow-up appointment with your primary care doctor or any specialists as instructed.     Discharge Plan:   Influenza Vaccine Indication: Not indicated: Previously immunized this influenza season and > 8 years of age    I understand that a diet low in cholesterol, fat, and sodium is recommended for good health. Unless I have been given specific instructions below for another diet, I accept this instruction as my diet prescription.   Other diet:  Low-Sodium Eating Plan  Sodium, which is an element that makes up salt, helps you maintain a healthy balance of fluids in your body. Too much sodium can increase your blood pressure and cause fluid and waste to be held in your body.  Your health care provider or dietitian may recommend following this plan if you have high blood pressure (hypertension), kidney disease, liver disease, or heart failure. Eating less sodium can help lower your blood pressure, reduce swelling, and protect your heart, liver, and kidneys.  What are tips for following this plan?  General guidelines  · Most people on this plan should limit their sodium intake to 1,500-2,000 mg  "(milligrams) of sodium each day.  Reading food labels    · The Nutrition Facts label lists the amount of sodium in one serving of the food. If you eat more than one serving, you must multiply the listed amount of sodium by the number of servings.  · Choose foods with less than 140 mg of sodium per serving.  · Avoid foods with 300 mg of sodium or more per serving.  Shopping  · Look for lower-sodium products, often labeled as \"low-sodium\" or \"no salt added.\"  · Always check the sodium content even if foods are labeled as \"unsalted\" or \"no salt added\".  · Buy fresh foods.  ? Avoid canned foods and premade or frozen meals.  ? Avoid canned, cured, or processed meats  · Buy breads that have less than 80 mg of sodium per slice.  Cooking  · Eat more home-cooked food and less restaurant, buffet, and fast food.  · Avoid adding salt when cooking. Use salt-free seasonings or herbs instead of table salt or sea salt. Check with your health care provider or pharmacist before using salt substitutes.  · Cook with plant-based oils, such as canola, sunflower, or olive oil.  Meal planning  · When eating at a restaurant, ask that your food be prepared with less salt or no salt, if possible.  · Avoid foods that contain MSG (monosodium glutamate). MSG is sometimes added to Chinese food, bouillon, and some canned foods.  What foods are recommended?  The items listed may not be a complete list. Talk with your dietitian about what dietary choices are best for you.  Grains  Low-sodium cereals, including oats, puffed wheat and rice, and shredded wheat. Low-sodium crackers. Unsalted rice. Unsalted pasta. Low-sodium bread. Whole-grain breads and whole-grain pasta.  Vegetables  Fresh or frozen vegetables. \"No salt added\" canned vegetables. \"No salt added\" tomato sauce and paste. Low-sodium or reduced-sodium tomato and vegetable juice.  Fruits  Fresh, frozen, or canned fruit. Fruit juice.  Meats and other protein foods  Fresh or frozen (no salt " added) meat, poultry, seafood, and fish. Low-sodium canned tuna and salmon. Unsalted nuts. Dried peas, beans, and lentils without added salt. Unsalted canned beans. Eggs. Unsalted nut butters.  Dairy  Milk. Soy milk. Cheese that is naturally low in sodium, such as ricotta cheese, fresh mozzarella, or Swiss cheese Low-sodium or reduced-sodium cheese. Cream cheese. Yogurt.  Fats and oils  Unsalted butter. Unsalted margarine with no trans fat. Vegetable oils such as canola or olive oils.  Seasonings and other foods  Fresh and dried herbs and spices. Salt-free seasonings. Low-sodium mustard and ketchup. Sodium-free salad dressing. Sodium-free light mayonnaise. Fresh or refrigerated horseradish. Lemon juice. Vinegar. Homemade, reduced-sodium, or low-sodium soups. Unsalted popcorn and pretzels. Low-salt or salt-free chips.  What foods are not recommended?  The items listed may not be a complete list. Talk with your dietitian about what dietary choices are best for you.  Grains  Instant hot cereals. Bread stuffing, pancake, and biscuit mixes. Croutons. Seasoned rice or pasta mixes. Noodle soup cups. Boxed or frozen macaroni and cheese. Regular salted crackers. Self-rising flour.  Vegetables  Sauerkraut, pickled vegetables, and relishes. Olives. French fries. Onion rings. Regular canned vegetables (not low-sodium or reduced-sodium). Regular canned tomato sauce and paste (not low-sodium or reduced-sodium). Regular tomato and vegetable juice (not low-sodium or reduced-sodium). Frozen vegetables in sauces.  Meats and other protein foods  Meat or fish that is salted, canned, smoked, spiced, or pickled. Zhu, ham, sausage, hotdogs, corned beef, chipped beef, packaged lunch meats, salt pork, jerky, pickled herring, anchovies, regular canned tuna, sardines, salted nuts.  Dairy  Processed cheese and cheese spreads. Cheese curds. Blue cheese. Feta cheese. String cheese. Regular cottage cheese. Buttermilk. Canned milk.  Fats and  oils  Salted butter. Regular margarine. Ghee. Zhu fat.  Seasonings and other foods  Onion salt, garlic salt, seasoned salt, table salt, and sea salt. Canned and packaged gravies. Worcestershire sauce. Tartar sauce. Barbecue sauce. Teriyaki sauce. Soy sauce, including reduced-sodium. Steak sauce. Fish sauce. Oyster sauce. Cocktail sauce. Horseradish that you find on the shelf. Regular ketchup and mustard. Meat flavorings and tenderizers. Bouillon cubes. Hot sauce and Tabasco sauce. Premade or packaged marinades. Premade or packaged taco seasonings. Relishes. Regular salad dressings. Salsa. Potato and tortilla chips. Corn chips and puffs. Salted popcorn and pretzels. Canned or dried soups. Pizza. Frozen entrees and pot pies.  Summary  · Eating less sodium can help lower your blood pressure, reduce swelling, and protect your heart, liver, and kidneys.  · Most people on this plan should limit their sodium intake to 1,500-2,000 mg (milligrams) of sodium each day.  · Canned, boxed, and frozen foods are high in sodium. Restaurant foods, fast foods, and pizza are also very high in sodium. You also get sodium by adding salt to food.  · Try to cook at home, eat more fresh fruits and vegetables, and eat less fast food, canned, processed, or prepared foods.  This information is not intended to replace advice given to you by your health care provider. Make sure you discuss any questions you have with your health care provider.  Document Released: 06/09/2003 Document Revised: 11/30/2018 Document Reviewed: 12/11/2017  [employer] Patient Education © 2020 [employer]      Special Instructions:   Hypertension, Adult  High blood pressure (hypertension) is when the force of blood pumping through the arteries is too strong. The arteries are the blood vessels that carry blood from the heart throughout the body. Hypertension forces the heart to work harder to pump blood and may cause arteries to become narrow or stiff. Untreated or  "uncontrolled hypertension can cause a heart attack, heart failure, a stroke, kidney disease, and other problems.  A blood pressure reading consists of a higher number over a lower number. Ideally, your blood pressure should be below 120/80. The first (\"top\") number is called the systolic pressure. It is a measure of the pressure in your arteries as your heart beats. The second (\"bottom\") number is called the diastolic pressure. It is a measure of the pressure in your arteries as the heart relaxes.  What are the causes?  The exact cause of this condition is not known. There are some conditions that result in or are related to high blood pressure.  What increases the risk?  Some risk factors for high blood pressure are under your control. The following factors may make you more likely to develop this condition:  · Smoking.  · Having type 2 diabetes mellitus, high cholesterol, or both.  · Not getting enough exercise or physical activity.  · Being overweight.  · Having too much fat, sugar, calories, or salt (sodium) in your diet.  · Drinking too much alcohol.  Some risk factors for high blood pressure may be difficult or impossible to change. Some of these factors include:  · Having chronic kidney disease.  · Having a family history of high blood pressure.  · Age. Risk increases with age.  · Race. You may be at higher risk if you are .  · Gender. Men are at higher risk than women before age 45. After age 65, women are at higher risk than men.  · Having obstructive sleep apnea.  · Stress.  What are the signs or symptoms?  High blood pressure may not cause symptoms. Very high blood pressure (hypertensive crisis) may cause:  · Headache.  · Anxiety.  · Shortness of breath.  · Nosebleed.  · Nausea and vomiting.  · Vision changes.  · Severe chest pain.  · Seizures.  How is this diagnosed?  This condition is diagnosed by measuring your blood pressure while you are seated, with your arm resting on a flat " surface, your legs uncrossed, and your feet flat on the floor. The cuff of the blood pressure monitor will be placed directly against the skin of your upper arm at the level of your heart. It should be measured at least twice using the same arm. Certain conditions can cause a difference in blood pressure between your right and left arms.  Certain factors can cause blood pressure readings to be lower or higher than normal for a short period of time:  · When your blood pressure is higher when you are in a health care provider's office than when you are at home, this is called white coat hypertension. Most people with this condition do not need medicines.  · When your blood pressure is higher at home than when you are in a health care provider's office, this is called masked hypertension. Most people with this condition may need medicines to control blood pressure.  If you have a high blood pressure reading during one visit or you have normal blood pressure with other risk factors, you may be asked to:  · Return on a different day to have your blood pressure checked again.  · Monitor your blood pressure at home for 1 week or longer.  If you are diagnosed with hypertension, you may have other blood or imaging tests to help your health care provider understand your overall risk for other conditions.  How is this treated?  This condition is treated by making healthy lifestyle changes, such as eating healthy foods, exercising more, and reducing your alcohol intake. Your health care provider may prescribe medicine if lifestyle changes are not enough to get your blood pressure under control, and if:  · Your systolic blood pressure is above 130.  · Your diastolic blood pressure is above 80.  Your personal target blood pressure may vary depending on your medical conditions, your age, and other factors.  Follow these instructions at home:  Eating and drinking    · Eat a diet that is high in fiber and potassium, and low in  sodium, added sugar, and fat. An example eating plan is called the DASH (Dietary Approaches to Stop Hypertension) diet. To eat this way:  ? Eat plenty of fresh fruits and vegetables. Try to fill one half of your plate at each meal with fruits and vegetables.  ? Eat whole grains, such as whole-wheat pasta, brown rice, or whole-grain bread. Fill about one fourth of your plate with whole grains.  ? Eat or drink low-fat dairy products, such as skim milk or low-fat yogurt.  ? Avoid fatty cuts of meat, processed or cured meats, and poultry with skin. Fill about one fourth of your plate with lean proteins, such as fish, chicken without skin, beans, eggs, or tofu.  ? Avoid pre-made and processed foods. These tend to be higher in sodium, added sugar, and fat.  · Reduce your daily sodium intake. Most people with hypertension should eat less than 1,500 mg of sodium a day.  · Do not drink alcohol if:  ? Your health care provider tells you not to drink.  ? You are pregnant, may be pregnant, or are planning to become pregnant.  · If you drink alcohol:  ? Limit how much you use to:  § 0-1 drink a day for women.  § 0-2 drinks a day for men.  ? Be aware of how much alcohol is in your drink. In the [place] one drink equals one 12 oz bottle of beer (355 mL), one 5 oz glass of wine (148 mL), or one 1½ oz glass of hard liquor (44 mL).  Lifestyle    · Work with your health care provider to maintain a healthy body weight or to lose weight. Ask what an ideal weight is for you.  · Get at least 30 minutes of exercise most days of the week. Activities may include walking, swimming, or biking.  · Include exercise to strengthen your muscles (resistance exercise), such as Pilates or lifting weights, as part of your weekly exercise routine. Try to do these types of exercises for 30 minutes at least 3 days a week.  · Do not use any products that contain nicotine or tobacco, such as cigarettes, e-cigarettes, and chewing tobacco. If you need help  quitting, ask your health care provider.  · Monitor your blood pressure at home as told by your health care provider.  · Keep all follow-up visits as told by your health care provider. This is important.  Medicines  · Take over-the-counter and prescription medicines only as told by your health care provider. Follow directions carefully. Blood pressure medicines must be taken as prescribed.  · Do not skip doses of blood pressure medicine. Doing this puts you at risk for problems and can make the medicine less effective.  · Ask your health care provider about side effects or reactions to medicines that you should watch for.  Contact a health care provider if you:  · Think you are having a reaction to a medicine you are taking.  · Have headaches that keep coming back (recurring).  · Feel dizzy.  · Have swelling in your ankles.  · Have trouble with your vision.  Get help right away if you:  · Develop a severe headache or confusion.  · Have unusual weakness or numbness.  · Feel faint.  · Have severe pain in your chest or abdomen.  · Vomit repeatedly.  · Have trouble breathing.  Summary  · Hypertension is when the force of blood pumping through your arteries is too strong. If this condition is not controlled, it may put you at risk for serious complications.  · Your personal target blood pressure may vary depending on your medical conditions, your age, and other factors. For most people, a normal blood pressure is less than 120/80.  · Hypertension is treated with lifestyle changes, medicines, or a combination of both. Lifestyle changes include losing weight, eating a healthy, low-sodium diet, exercising more, and limiting alcohol.  This information is not intended to replace advice given to you by your health care provider. Make sure you discuss any questions you have with your health care provider.  Document Released: 12/18/2006 Document Revised: 08/28/2019 Document Reviewed: 08/28/2019  [employer]vier Patient Education © 2020  [employer]      · Is patient discharged on Warfarin / Coumadin?   NO    Depression / Suicide Risk    As you are discharged from this [facility] facility, it is important to learn how to keep safe from harming yourself.    Recognize the warning signs:  · Abrupt changes in personality, positive or negative- including increase in energy   · Giving away possessions  · Change in eating patterns- significant weight changes-  positive or negative  · Change in sleeping patterns- unable to sleep or sleeping all the time   · Unwillingness or inability to communicate  · Depression  · Unusual sadness, discouragement and loneliness  · Talk of wanting to die  · Neglect of personal appearance   · Rebelliousness- reckless behavior  · Withdrawal from people/activities they love  · Confusion- inability to concentrate     If you or a loved one observes any of these behaviors or has concerns about self-harm, here's what you can do:  · Talk about it- your feelings and reasons for harming yourself  · Remove any means that you might use to hurt yourself (examples: pills, rope, extension cords, firearm)  · Get professional help from the community (Mental Health, Substance Abuse, psychological counseling)  · Do not be alone:Call your Safe Contact- someone whom you trust who will be there for you.  · Call your local CRISIS HOTLINE 441-6934 or 350-102-3415  · Call your local Children's Mobile Crisis Response Team [place] (513) 420-5869 or www.FreakOut  · Call the toll free National Suicide Prevention Hotlines   · National Suicide Prevention Lifeline 532-265-OFGD (3839)  · National Hope Line Network 800-SUICIDE (695-7344)

## 2022-03-31 ENCOUNTER — TELEPHONE (OUTPATIENT)
Dept: SLEEP MEDICINE | Facility: MEDICAL CENTER | Age: 52
End: 2022-03-31
Payer: COMMERCIAL

## 2022-03-31 DIAGNOSIS — J45.30 MILD PERSISTENT ASTHMA WITHOUT COMPLICATION: ICD-10-CM

## 2022-03-31 DIAGNOSIS — B33.8 RSV (RESPIRATORY SYNCYTIAL VIRUS INFECTION): ICD-10-CM

## 2022-03-31 RX ORDER — CLINDAMYCIN HYDROCHLORIDE 150 MG/1
300 CAPSULE ORAL 3 TIMES DAILY
Qty: 21 CAPSULE | Refills: 0 | Status: SHIPPED | OUTPATIENT
Start: 2022-03-31 | End: 2022-04-07

## 2022-03-31 RX ORDER — BUDESONIDE 0.5 MG/2ML
500 INHALANT ORAL 2 TIMES DAILY
Qty: 60 ML | Refills: 11 | Status: SHIPPED | OUTPATIENT
Start: 2022-03-31 | End: 2022-06-01

## 2022-03-31 RX ORDER — IPRATROPIUM BROMIDE AND ALBUTEROL SULFATE 2.5; .5 MG/3ML; MG/3ML
3 SOLUTION RESPIRATORY (INHALATION) EVERY 6 HOURS PRN
Qty: 120 EACH | Refills: 3 | Status: SHIPPED | OUTPATIENT
Start: 2022-03-31 | End: 2022-04-30

## 2022-03-31 RX ORDER — PREDNISONE 10 MG/1
10 TABLET ORAL DAILY
Qty: 18 TABLET | Refills: 2 | Status: SHIPPED | OUTPATIENT
Start: 2022-03-31 | End: 2022-04-18

## 2022-03-31 NOTE — TELEPHONE ENCOUNTER
Haroldo from [facility] calling in regards to clindamycin (CLEOCIN) 150 MG Cap. Written as 300mg two tabs TID for 7 days, quantity of 21.     I spoke to Dr. Wong verbally,  mg TID for 7 days.     Verbal to Haroldo per Dr. Wong Clindamycin 300 mg TID for 7 days quantity 21 caps.

## 2022-04-01 ENCOUNTER — TELEMEDICINE (OUTPATIENT)
Dept: SLEEP MEDICINE | Facility: MEDICAL CENTER | Age: 52
End: 2022-04-01
Payer: COMMERCIAL

## 2022-04-01 VITALS
HEIGHT: 67 IN | BODY MASS INDEX: 22.76 KG/M2 | DIASTOLIC BLOOD PRESSURE: 97 MMHG | HEART RATE: 98 BPM | WEIGHT: 145 LBS | SYSTOLIC BLOOD PRESSURE: 133 MMHG

## 2022-04-01 DIAGNOSIS — J45.30 MILD PERSISTENT ASTHMA WITHOUT COMPLICATION: ICD-10-CM

## 2022-04-01 DIAGNOSIS — B33.8 RSV (RESPIRATORY SYNCYTIAL VIRUS INFECTION): ICD-10-CM

## 2022-04-01 DIAGNOSIS — R07.9 CHEST PAIN, UNSPECIFIED TYPE: ICD-10-CM

## 2022-04-01 PROCEDURE — 99214 OFFICE O/P EST MOD 30 MIN: CPT | Mod: 95 | Performed by: INTERNAL MEDICINE

## 2022-04-01 ASSESSMENT — ENCOUNTER SYMPTOMS
SPUTUM PRODUCTION: 1
SHORTNESS OF BREATH: 1
EYES NEGATIVE: 1
CARDIOVASCULAR NEGATIVE: 1
WEIGHT LOSS: 1
MUSCULOSKELETAL NEGATIVE: 1
COUGH: 1
NEUROLOGICAL NEGATIVE: 1
GASTROINTESTINAL NEGATIVE: 1
PSYCHIATRIC NEGATIVE: 1

## 2022-04-01 ASSESSMENT — FIBROSIS 4 INDEX: FIB4 SCORE: 0.94

## 2022-04-01 NOTE — PROGRESS NOTES
Telemedicine: Established Patient   This evaluation was conducted via Zoom using secure and encrypted videoconferencing technology. The patient was in their home in the [facility].    The patient's identity was confirmed and verbal consent was obtained for this virtual visit.    Subjective:   CC:   Chief Complaint   Patient presents with   • Asthma       Odalys Ballard is a 51 y.o. female presenting for evaluation and management of asthma exacerbation from RSV infection this past week.  She required 2 days of hospitalizations. This past week.  Had a difficulty time of tolerating prednisone  Pts phlegm changed yesterday and pain has been progressing in her chest and wheezing  Started budesonide nebs bid and duonebs prn  Started on Clindamycin yesterday too    Today she is much better - cough has markedly improved  She does complain about left sided chest pain with inspiration and swelling of the left side of her breast - she saw her plastic surgeon who did order an US of the chest    Review of Systems   Constitutional: Positive for malaise/fatigue and weight loss.   HENT: Negative.    Eyes: Negative.    Respiratory: Positive for cough, sputum production and shortness of breath.    Cardiovascular: Negative.    Gastrointestinal: Negative.    Genitourinary: Negative.    Musculoskeletal: Negative.    Skin: Negative.    Neurological: Negative.    Endo/Heme/Allergies: Negative.    Psychiatric/Behavioral: Negative.          Allergies   Allergen Reactions   • Lisinopril Cough   • Losartan Cough     Per pt reports that she had bad congestion        Current medicines (including changes today)  Current Outpatient Medications   Medication Sig Dispense Refill   • predniSONE (DELTASONE) 10 MG Tab Take 1 Tablet by mouth every day for 20 doses. Take 30mg x 3 days, then take 20mg x 3 days, then take 10mg x 3 days, with food, then discontinue. 18 Tablet 2   • budesonide (PULMICORT) 0.5 MG/2ML Suspension Take 2 mL by nebulization  2 times a day. Rinse mouth after each use. 60 mL 11   • ipratropium-albuterol (DUONEB) 0.5-2.5 (3) MG/3ML nebulizer solution Take 3 mL by nebulization every 6 hours as needed for Shortness of Breath (wheezing) for up to 30 days. 120 Each 3   • clindamycin (CLEOCIN) 150 MG Cap Take 2 Capsules by mouth 3 times a day for 7 days. 21 Capsule 0   • hydroCHLOROthiazide (HYDRODIURIL) 25 MG Tab Take 1 Tablet by mouth every day for 30 days. 30 Tablet 0   • benzonatate (TESSALON) 100 MG Cap Take 1 Capsule by mouth 3 times a day as needed for Cough. 60 Capsule 0   • budesonide-formoterol (SYMBICORT) 160-4.5 MCG/ACT Aerosol Inhale 2 Puffs 2 times a day for 30 days. 10.2 g 0   • Pseudoephedrine-DM-GG (ROBITUSSIN CF PO) Take 20 mL by mouth 2 times a day as needed (For cough).     • rizatriptan (MAXALT) 10 MG tablet TAKE 1 TABLET BY MOUTH ONCE DAILY AS NEEDED FOR MIGRAINE HEADACHE (Patient taking differently: Take 10 mg by mouth one time as needed for Migraine.) 12 Tablet 5   • Eszopiclone 3 MG Tab Take 3 mg by mouth every evening.     • aspirin EC (ECOTRIN) 81 MG Tablet Delayed Response Take 81 mg by mouth every evening.     • baclofen (LIORESAL) 10 MG Tab TAKE 1 TABLET BY MOUTH ONCE DAILY AS NEEDED (Patient taking differently: Take 10 mg by mouth 2 times a day as needed. Indications: Muscle Spasm, Muscle Spasticity) 90 tablet 3   • albuterol 108 (90 Base) MCG/ACT Aero Soln inhalation aerosol Inhale 2 Puffs by mouth every 6 hours as needed for Shortness of Breath. 8.5 g 11     No current facility-administered medications for this visit.       Patient Active Problem List    Diagnosis Date Noted   • Chest pain 03/27/2022   • Shortness of breath 03/27/2022   • RSV (respiratory syncytial virus infection) 03/27/2022   • Abnormal EKG 03/27/2022   • Hypertensive urgency 03/27/2022   • Dissection of vertebral artery (HCC)- left on CTA 6/2021- Dr. Saldana 03/03/2022   • S/P bilateral mastectomy 05/04/2021   • Excessive sodium intake  "10/01/2020   • Cervical radiculopathy at C6 10/01/2020   • Essential hypertension 10/01/2020   • IFG (impaired fasting glucose) 10/01/2020   • Vitamin D deficiency 10/01/2020   • Dyslipidemia 10/01/2020   • Primary insomnia 10/01/2020   • Mild persistent asthma without complication 06/15/2020   • Family history of breast cancer gene mutation in first degree relative 06/15/2020   • White coat syndrome with diagnosis of hypertension 06/15/2020   • Migraine without aura and with status migrainosus, not intractable 06/15/2020       Family History   Problem Relation Age of Onset   • Diabetes Other    • Heart Disease Other    • Hypertension Other    • Cancer Other        She  has a past medical history of Anesthesia, ASTHMA, At high risk for breast cancer- prophylactic bilateral mastectomy TBD November 2020 (10/1/2020), Bronchitis (2015), Heart burn, Hiatus hernia syndrome, Hypertension, Other specified symptom associated with female genital organs, and Pneumonia (2010).    She has no past medical history of Allergy.  She  has a past surgical history that includes cholecystectomy (2009); laparoscopy (2000, 2008); pelviscopy (9/6/2013); hysterectomy robotic (N/A, 10/17/2016); cystoscopy (N/A, 10/17/2016); pr mastectomy, simple, complete (Bilateral, 11/5/2020); breast reconstruction (Bilateral, 11/5/2020); tissue expander place/remove (Bilateral, 11/5/2020); breast reconstruction (Bilateral, 2/11/2021); excision,fat graft (2/11/2021); tissue expander place/remove (Bilateral, 2/11/2021); breast implant revision (Bilateral, 2/11/2021); and liposuction (2/11/2021).       Objective:   /97 (BP Location: Left arm, Patient Position: Sitting, BP Cuff Size: Adult)   Pulse 98   Ht 1.702 m (5' 7\") Comment: per pt  Wt 65.8 kg (145 lb) Comment: per pt  LMP 10/08/2016   BMI 22.71 kg/m²     Physical Exam  Occasional cough  Speaking in full sentences not using accessory muscles  Able to speak without wheezing  Assessment and " Plan:   The following treatment plan was discussed:     1. Mild persistent asthma without complication  - PULMONARY FUNCTION TESTS -Test requested: Complete Pulmonary Function Test; Future    2. Chest pain, unspecified type    3. RSV (respiratory syncytial virus infection)    Pt has improved in symptoms  Continue budesonide nebs bid * 3 days as well as duonebs bid for three days then prn  No need to take oral steroids  Abx 3 days  Continue symbicort bid as helpful for the small airways    To note CT chest reviewed on admission with patient. Bronchial cuffing prominent and mild bronchiectasis  Upon recovery of this exacerbation recommend pft,, ige, allergy panel    Also noted on the CT left breast implant larger than right and that is where pt is sensing pain - plastic surgeon did order an US and pt would like to have it done at [facility] - will see if we can get it scheduled  If concern for muscle soreness - icycold balm is helpful    Pt will contact us if worse     For now follow up in 3 months     Follow-up: Return in about 3 months (around 7/1/2022) for with pfts.

## 2022-04-02 LAB
BACTERIA BLD CULT: NORMAL
SIGNIFICANT IND 70042: NORMAL
SITE SITE: NORMAL
SOURCE SOURCE: NORMAL

## 2022-04-03 ENCOUNTER — HOSPITAL ENCOUNTER (OUTPATIENT)
Dept: RADIOLOGY | Facility: MEDICAL CENTER | Age: 52
End: 2022-04-03
Attending: INTERNAL MEDICINE
Payer: COMMERCIAL

## 2022-04-03 DIAGNOSIS — R07.9 CHEST PAIN, UNSPECIFIED TYPE: ICD-10-CM

## 2022-04-03 PROCEDURE — 71111 X-RAY EXAM RIBS/CHEST4/> VWS: CPT

## 2022-04-03 RX ORDER — LIDOCAINE 50 MG/G
1 PATCH TOPICAL EVERY 24 HOURS
Qty: 30 PATCH | Refills: 1 | Status: SHIPPED | OUTPATIENT
Start: 2022-04-03 | End: 2022-05-03

## 2022-04-03 RX ORDER — LIDOCAINE 4 G/G
1 PATCH TOPICAL
Qty: 15 PATCH | Refills: 1 | Status: SHIPPED | OUTPATIENT
Start: 2022-04-03 | End: 2022-04-03

## 2022-04-03 NOTE — PROGRESS NOTES
D/w US must be breast US and done at [facility]  Rib xrays negative  CXR with no I or E  Needs to see plastic surgeon as skin is stretched at the implant has moved left breast bigger than right  D/w ER and next step would be plastics    Lidocaine patch to aide with pain till able to see plastic surgeon and attain breast US as requested by surgeon

## 2022-04-04 ENCOUNTER — PATIENT MESSAGE (OUTPATIENT)
Dept: SLEEP MEDICINE | Facility: MEDICAL CENTER | Age: 52
End: 2022-04-04
Payer: COMMERCIAL

## 2022-04-06 DIAGNOSIS — F51.01 PRIMARY INSOMNIA: ICD-10-CM

## 2022-04-06 RX ORDER — ESZOPICLONE 3 MG/1
3 TABLET, FILM COATED ORAL EVERY EVENING
Qty: 90 TABLET | Refills: 1 | Status: SHIPPED | OUTPATIENT
Start: 2022-04-06 | End: 2022-09-27

## 2022-04-09 ENCOUNTER — PATIENT MESSAGE (OUTPATIENT)
Dept: SLEEP MEDICINE | Facility: MEDICAL CENTER | Age: 52
End: 2022-04-09

## 2022-04-09 ENCOUNTER — HOSPITAL ENCOUNTER (OUTPATIENT)
Dept: LAB | Facility: MEDICAL CENTER | Age: 52
End: 2022-04-09
Attending: INTERNAL MEDICINE
Payer: COMMERCIAL

## 2022-04-09 DIAGNOSIS — R10.11 RIGHT UPPER QUADRANT ABDOMINAL PAIN: ICD-10-CM

## 2022-04-09 LAB
ALBUMIN SERPL BCP-MCNC: 4.9 G/DL (ref 3.2–4.9)
ALP SERPL-CCNC: 87 U/L (ref 30–99)
ALT SERPL-CCNC: 18 U/L (ref 2–50)
ANION GAP SERPL CALC-SCNC: 11 MMOL/L (ref 7–16)
AST SERPL-CCNC: 16 U/L (ref 12–45)
BASOPHILS # BLD AUTO: 0.8 % (ref 0–1.8)
BASOPHILS # BLD: 0.06 K/UL (ref 0–0.12)
BILIRUB CONJ SERPL-MCNC: <0.2 MG/DL (ref 0.1–0.5)
BILIRUB INDIRECT SERPL-MCNC: NORMAL MG/DL (ref 0–1)
BILIRUB SERPL-MCNC: 0.4 MG/DL (ref 0.1–1.5)
BUN SERPL-MCNC: 25 MG/DL (ref 8–22)
CALCIUM SERPL-MCNC: 9.9 MG/DL (ref 8.5–10.5)
CHLORIDE SERPL-SCNC: 103 MMOL/L (ref 96–112)
CO2 SERPL-SCNC: 26 MMOL/L (ref 20–33)
CREAT SERPL-MCNC: 0.85 MG/DL (ref 0.5–1.4)
CRP SERPL HS-MCNC: <0.3 MG/DL (ref 0–0.75)
EOSINOPHIL # BLD AUTO: 0.02 K/UL (ref 0–0.51)
EOSINOPHIL NFR BLD: 0.3 % (ref 0–6.9)
ERYTHROCYTE [DISTWIDTH] IN BLOOD BY AUTOMATED COUNT: 39.9 FL (ref 35.9–50)
GFR SERPLBLD CREATININE-BSD FMLA CKD-EPI: 84 ML/MIN/1.73 M 2
GLUCOSE SERPL-MCNC: 109 MG/DL (ref 65–99)
HCT VFR BLD AUTO: 43.6 % (ref 37–47)
HGB BLD-MCNC: 14.6 G/DL (ref 12–16)
IMM GRANULOCYTES # BLD AUTO: 0.06 K/UL (ref 0–0.11)
IMM GRANULOCYTES NFR BLD AUTO: 0.8 % (ref 0–0.9)
LYMPHOCYTES # BLD AUTO: 1.48 K/UL (ref 1–4.8)
LYMPHOCYTES NFR BLD: 18.9 % (ref 22–41)
MCH RBC QN AUTO: 31.7 PG (ref 27–33)
MCHC RBC AUTO-ENTMCNC: 33.5 G/DL (ref 33.6–35)
MCV RBC AUTO: 94.8 FL (ref 81.4–97.8)
MONOCYTES # BLD AUTO: 0.47 K/UL (ref 0–0.85)
MONOCYTES NFR BLD AUTO: 6 % (ref 0–13.4)
NEUTROPHILS # BLD AUTO: 5.74 K/UL (ref 2–7.15)
NEUTROPHILS NFR BLD: 73.2 % (ref 44–72)
NRBC # BLD AUTO: 0 K/UL
NRBC BLD-RTO: 0 /100 WBC
PLATELET # BLD AUTO: 335 K/UL (ref 164–446)
PMV BLD AUTO: 10.4 FL (ref 9–12.9)
POTASSIUM SERPL-SCNC: 4.2 MMOL/L (ref 3.6–5.5)
PROT SERPL-MCNC: 7.3 G/DL (ref 6–8.2)
RBC # BLD AUTO: 4.6 M/UL (ref 4.2–5.4)
SODIUM SERPL-SCNC: 140 MMOL/L (ref 135–145)
WBC # BLD AUTO: 7.8 K/UL (ref 4.8–10.8)

## 2022-04-09 PROCEDURE — 80076 HEPATIC FUNCTION PANEL: CPT

## 2022-04-09 PROCEDURE — 84244 ASSAY OF RENIN: CPT

## 2022-04-09 PROCEDURE — 36415 COLL VENOUS BLD VENIPUNCTURE: CPT

## 2022-04-09 PROCEDURE — 85025 COMPLETE CBC W/AUTO DIFF WBC: CPT

## 2022-04-09 PROCEDURE — 86140 C-REACTIVE PROTEIN: CPT

## 2022-04-09 PROCEDURE — 80048 BASIC METABOLIC PNL TOTAL CA: CPT

## 2022-04-14 ENCOUNTER — HOSPITAL ENCOUNTER (OUTPATIENT)
Dept: RADIOLOGY | Facility: MEDICAL CENTER | Age: 52
End: 2022-04-14
Attending: NURSE PRACTITIONER
Payer: COMMERCIAL

## 2022-04-14 ENCOUNTER — HOSPITAL ENCOUNTER (OUTPATIENT)
Dept: LAB | Facility: MEDICAL CENTER | Age: 52
End: 2022-04-14
Attending: NURSE PRACTITIONER
Payer: COMMERCIAL

## 2022-04-14 DIAGNOSIS — R10.9 STOMACH ACHE: ICD-10-CM

## 2022-04-14 DIAGNOSIS — R10.11 ABDOMINAL PAIN, RIGHT UPPER QUADRANT: ICD-10-CM

## 2022-04-14 DIAGNOSIS — R10.31 ABDOMINAL PAIN, RIGHT LOWER QUADRANT: ICD-10-CM

## 2022-04-14 LAB
APPEARANCE UR: CLEAR
BILIRUB UR QL STRIP.AUTO: NEGATIVE
COLOR UR: YELLOW
GLUCOSE UR STRIP.AUTO-MCNC: NEGATIVE MG/DL
KETONES UR STRIP.AUTO-MCNC: NEGATIVE MG/DL
LEUKOCYTE ESTERASE UR QL STRIP.AUTO: NEGATIVE
MICRO URNS: NORMAL
NITRITE UR QL STRIP.AUTO: NEGATIVE
PH UR STRIP.AUTO: 5 [PH] (ref 5–8)
PROT UR QL STRIP: NEGATIVE MG/DL
RBC UR QL AUTO: NEGATIVE
RENIN PLAS-CCNC: 1 NG/ML/HR
SP GR UR STRIP.AUTO: 1.03
UROBILINOGEN UR STRIP.AUTO-MCNC: 0.2 MG/DL

## 2022-04-14 PROCEDURE — 81003 URINALYSIS AUTO W/O SCOPE: CPT

## 2022-04-14 PROCEDURE — 74177 CT ABD & PELVIS W/CONTRAST: CPT

## 2022-04-14 PROCEDURE — 71046 X-RAY EXAM CHEST 2 VIEWS: CPT

## 2022-04-14 PROCEDURE — 700117 HCHG RX CONTRAST REV CODE 255: Performed by: NURSE PRACTITIONER

## 2022-04-14 RX ADMIN — IOHEXOL 100 ML: 350 INJECTION, SOLUTION INTRAVENOUS at 18:15

## 2022-04-18 ENCOUNTER — OFFICE VISIT (OUTPATIENT)
Dept: MEDICAL GROUP | Age: 52
End: 2022-04-18
Payer: COMMERCIAL

## 2022-04-18 VITALS
OXYGEN SATURATION: 96 % | DIASTOLIC BLOOD PRESSURE: 98 MMHG | RESPIRATION RATE: 16 BRPM | HEART RATE: 95 BPM | BODY MASS INDEX: 23.45 KG/M2 | SYSTOLIC BLOOD PRESSURE: 126 MMHG | WEIGHT: 149.4 LBS | HEIGHT: 67 IN | TEMPERATURE: 98.6 F

## 2022-04-18 DIAGNOSIS — I10 ESSENTIAL HYPERTENSION: ICD-10-CM

## 2022-04-18 DIAGNOSIS — G58.8 INTERCOSTAL NEURITIS: ICD-10-CM

## 2022-04-18 DIAGNOSIS — B33.8 RSV (RESPIRATORY SYNCYTIAL VIRUS INFECTION): ICD-10-CM

## 2022-04-18 PROCEDURE — 99214 OFFICE O/P EST MOD 30 MIN: CPT | Performed by: INTERNAL MEDICINE

## 2022-04-18 RX ORDER — HYDROCHLOROTHIAZIDE 25 MG/1
25 TABLET ORAL DAILY
Qty: 90 TABLET | Refills: 3 | Status: SHIPPED | OUTPATIENT
Start: 2022-04-18 | End: 2022-06-01 | Stop reason: SDUPTHER

## 2022-04-18 RX ORDER — AMLODIPINE BESYLATE 2.5 MG/1
2.5 TABLET ORAL DAILY
Qty: 30 TABLET | Refills: 3 | Status: SHIPPED | OUTPATIENT
Start: 2022-04-18 | End: 2022-08-31

## 2022-04-18 RX ORDER — PREDNISONE 10 MG/1
10 TABLET ORAL DAILY
Qty: 30 TABLET | Refills: 0 | Status: SHIPPED | OUTPATIENT
Start: 2022-04-18 | End: 2022-05-05

## 2022-04-18 ASSESSMENT — ENCOUNTER SYMPTOMS
EYES NEGATIVE: 1
CONSTITUTIONAL NEGATIVE: 1
NEUROLOGICAL NEGATIVE: 1
MUSCULOSKELETAL NEGATIVE: 1
RESPIRATORY NEGATIVE: 1
GASTROINTESTINAL NEGATIVE: 1
PSYCHIATRIC NEGATIVE: 1
CARDIOVASCULAR NEGATIVE: 1

## 2022-04-18 ASSESSMENT — FIBROSIS 4 INDEX: FIB4 SCORE: 0.55

## 2022-04-18 NOTE — PROGRESS NOTES
Subjective     Odalys Ballard is a 51 y.o. female who presents with Follow-Up (BP check / had RSV )    The patient is here for followup of chronic medical problems listed below. The patient is compliant with medications and having no side effects from them. Denies chest pain, abdominal pain, dyspnea, myalgias, or cough.   Patient Active Problem List    Diagnosis Date Noted   • Chest pain 03/27/2022   • Shortness of breath 03/27/2022   • RSV (respiratory syncytial virus infection) 03/27/2022   • Abnormal EKG 03/27/2022   • Hypertensive urgency 03/27/2022   • Dissection of vertebral artery (HCC)- left on CTA 6/2021- Dr. Saldana 03/03/2022   • S/P bilateral mastectomy 05/04/2021   • Excessive sodium intake 10/01/2020   • Cervical radiculopathy at C6 10/01/2020   • Essential hypertension 10/01/2020   • IFG (impaired fasting glucose) 10/01/2020   • Vitamin D deficiency 10/01/2020   • Dyslipidemia 10/01/2020   • Primary insomnia 10/01/2020   • Mild persistent asthma without complication 06/15/2020   • Family history of breast cancer gene mutation in first degree relative 06/15/2020   • White coat syndrome with diagnosis of hypertension 06/15/2020   • Migraine without aura and with status migrainosus, not intractable 06/15/2020     Allergies   Allergen Reactions   • Lisinopril Cough   • Losartan Cough     Per pt reports that she had bad congestion      Outpatient Medications Prior to Visit   Medication Sig Dispense Refill   • Eszopiclone 3 MG Tab Take 1 Tablet by mouth every evening for 90 days. 90 Tablet 1   • lidocaine (LIDODERM) 5 % Patch Place 1 Patch on the skin every 24 hours for 30 days. 30 Patch 1   • budesonide (PULMICORT) 0.5 MG/2ML Suspension Take 2 mL by nebulization 2 times a day. Rinse mouth after each use. 60 mL 11   • ipratropium-albuterol (DUONEB) 0.5-2.5 (3) MG/3ML nebulizer solution Take 3 mL by nebulization every 6 hours as needed for Shortness of Breath (wheezing) for up to 30 days. 120 Each 3   •  "budesonide-formoterol (SYMBICORT) 160-4.5 MCG/ACT Aerosol Inhale 2 Puffs 2 times a day for 30 days. 10.2 g 0   • rizatriptan (MAXALT) 10 MG tablet TAKE 1 TABLET BY MOUTH ONCE DAILY AS NEEDED FOR MIGRAINE HEADACHE (Patient taking differently: Take 10 mg by mouth one time as needed for Migraine.) 12 Tablet 5   • predniSONE (DELTASONE) 10 MG Tab Take 1 Tablet by mouth every day for 20 doses. Take 30mg x 3 days, then take 20mg x 3 days, then take 10mg x 3 days, with food, then discontinue. 18 Tablet 2   • hydroCHLOROthiazide (HYDRODIURIL) 25 MG Tab Take 1 Tablet by mouth every day for 30 days. 30 Tablet 0   • benzonatate (TESSALON) 100 MG Cap Take 1 Capsule by mouth 3 times a day as needed for Cough. 60 Capsule 0   • Pseudoephedrine-DM-GG (ROBITUSSIN CF PO) Take 20 mL by mouth 2 times a day as needed (For cough).     • aspirin EC (ECOTRIN) 81 MG Tablet Delayed Response Take 81 mg by mouth every evening.     • baclofen (LIORESAL) 10 MG Tab TAKE 1 TABLET BY MOUTH ONCE DAILY AS NEEDED (Patient taking differently: Take 10 mg by mouth 2 times a day as needed. Indications: Muscle Spasm, Muscle Spasticity) 90 tablet 3   • albuterol 108 (90 Base) MCG/ACT Aero Soln inhalation aerosol Inhale 2 Puffs by mouth every 6 hours as needed for Shortness of Breath. 8.5 g 11     No facility-administered medications prior to visit.             HPI    Review of Systems   Constitutional: Negative.    HENT: Negative.    Eyes: Negative.    Respiratory: Negative.    Cardiovascular: Negative.    Gastrointestinal: Negative.    Genitourinary: Negative.    Musculoskeletal: Negative.    Skin: Negative.    Neurological: Negative.    Endo/Heme/Allergies: Negative.    Psychiatric/Behavioral: Negative.               Objective     /98 (BP Location: Left arm, Patient Position: Sitting, BP Cuff Size: Adult)   Pulse 95   Temp 37 °C (98.6 °F) (Temporal)   Resp 16   Ht 1.702 m (5' 7\")   Wt 67.8 kg (149 lb 6.4 oz)   LMP 10/08/2016   SpO2 96%   BMI " 23.40 kg/m²      Physical Exam  Vitals reviewed.   Constitutional:       General: She is not in acute distress.     Appearance: She is well-developed. She is not diaphoretic.   HENT:      Head: Normocephalic and atraumatic.      Right Ear: External ear normal.      Left Ear: External ear normal.      Nose: Nose normal.      Mouth/Throat:      Pharynx: No oropharyngeal exudate.   Eyes:      General: No scleral icterus.        Right eye: No discharge.         Left eye: No discharge.      Conjunctiva/sclera: Conjunctivae normal.      Pupils: Pupils are equal, round, and reactive to light.   Neck:      Thyroid: No thyromegaly.      Vascular: No JVD.      Trachea: No tracheal deviation.   Cardiovascular:      Rate and Rhythm: Normal rate and regular rhythm.      Heart sounds: Normal heart sounds. No murmur heard.    No friction rub. No gallop.   Pulmonary:      Effort: Pulmonary effort is normal. No respiratory distress.      Breath sounds: Normal breath sounds. No stridor. No wheezing or rales.   Chest:      Chest wall: No tenderness.   Abdominal:      General: Bowel sounds are normal. There is no distension.      Palpations: Abdomen is soft. There is no mass.      Tenderness: There is no abdominal tenderness. There is no guarding or rebound.   Musculoskeletal:         General: No tenderness. Normal range of motion.      Cervical back: Normal range of motion and neck supple.   Lymphadenopathy:      Cervical: No cervical adenopathy.   Skin:     General: Skin is warm and dry.      Coloration: Skin is not pale.      Findings: No erythema or rash.   Neurological:      Mental Status: She is alert and oriented to person, place, and time.      Cranial Nerves: No cranial nerve deficit.      Motor: No abnormal muscle tone.      Coordination: Coordination normal.      Deep Tendon Reflexes: Reflexes are normal and symmetric. Reflexes normal.   Psychiatric:         Behavior: Behavior normal.         Thought Content: Thought content  normal.         Judgment: Judgment normal.                  Hospital Outpatient Visit on 04/14/2022   Component Date Value   • Color 04/14/2022 Yellow    • Character 04/14/2022 Clear    • Specific Gravity 04/14/2022 1.029    • Ph 04/14/2022 5.0    • Glucose 04/14/2022 Negative    • Ketones 04/14/2022 Negative    • Protein 04/14/2022 Negative    • Bilirubin 04/14/2022 Negative    • Urobilinogen, Urine 04/14/2022 0.2    • Nitrite 04/14/2022 Negative    • Leukocyte Esterase 04/14/2022 Negative    • Occult Blood 04/14/2022 Negative    • Micro Urine Req 04/14/2022 see below    Hospital Outpatient Visit on 04/09/2022   Component Date Value   • Renin Activity 04/09/2022 1.0    Hospital Outpatient Visit on 04/09/2022   Component Date Value   • WBC 04/09/2022 7.8    • RBC 04/09/2022 4.60    • Hemoglobin 04/09/2022 14.6    • Hematocrit 04/09/2022 43.6    • MCV 04/09/2022 94.8    • MCH 04/09/2022 31.7    • MCHC 04/09/2022 33.5 (A)   • RDW 04/09/2022 39.9    • Platelet Count 04/09/2022 335    • MPV 04/09/2022 10.4    • Neutrophils-Polys 04/09/2022 73.20 (A)   • Lymphocytes 04/09/2022 18.90 (A)   • Monocytes 04/09/2022 6.00    • Eosinophils 04/09/2022 0.30    • Basophils 04/09/2022 0.80    • Immature Granulocytes 04/09/2022 0.80    • Nucleated RBC 04/09/2022 0.00    • Neutrophils (Absolute) 04/09/2022 5.74    • Lymphs (Absolute) 04/09/2022 1.48    • Monos (Absolute) 04/09/2022 0.47    • Eos (Absolute) 04/09/2022 0.02    • Baso (Absolute) 04/09/2022 0.06    • Immature Granulocytes (a* 04/09/2022 0.06    • NRBC (Absolute) 04/09/2022 0.00    • Stat C-Reactive Protein 04/09/2022 <0.30    • Sodium 04/09/2022 140    • Potassium 04/09/2022 4.2    • Chloride 04/09/2022 103    • Co2 04/09/2022 26    • Glucose 04/09/2022 109 (A)   • Bun 04/09/2022 25 (A)   • Creatinine 04/09/2022 0.85    • Calcium 04/09/2022 9.9    • Anion Gap 04/09/2022 11.0    • Alkaline Phosphatase 04/09/2022 87    • AST(SGOT) 04/09/2022 16    • ALT(SGPT) 04/09/2022 18     • Total Bilirubin 04/09/2022 0.4    • Direct Bilirubin 04/09/2022 <0.2    • Indirect Bilirubin 04/09/2022 see below    • Albumin 04/09/2022 4.9    • Total Protein 04/09/2022 7.3    • GFR (CKD-EPI) 04/09/2022 84    Admission on 03/27/2022, Discharged on 03/28/2022   Component Date Value   • WBC 03/27/2022 9.4    • RBC 03/27/2022 4.65    • Hemoglobin 03/27/2022 14.7    • Hematocrit 03/27/2022 43.5    • MCV 03/27/2022 93.5    • MCH 03/27/2022 31.6    • MCHC 03/27/2022 33.8    • RDW 03/27/2022 39.3    • Platelet Count 03/27/2022 292    • MPV 03/27/2022 9.5    • Neutrophils-Polys 03/27/2022 60.00    • Lymphocytes 03/27/2022 28.80    • Monocytes 03/27/2022 8.30    • Eosinophils 03/27/2022 0.50    • Basophils 03/27/2022 0.70    • Immature Granulocytes 03/27/2022 1.70 (A)   • Nucleated RBC 03/27/2022 0.00    • Neutrophils (Absolute) 03/27/2022 5.61    • Lymphs (Absolute) 03/27/2022 2.70    • Monos (Absolute) 03/27/2022 0.78    • Eos (Absolute) 03/27/2022 0.05    • Baso (Absolute) 03/27/2022 0.07    • Immature Granulocytes (a* 03/27/2022 0.16 (A)   • NRBC (Absolute) 03/27/2022 0.00    • Sodium 03/27/2022 138    • Potassium 03/27/2022 3.4 (A)   • Chloride 03/27/2022 99    • Co2 03/27/2022 24    • Anion Gap 03/27/2022 15.0    • Glucose 03/27/2022 118 (A)   • Bun 03/27/2022 23 (A)   • Creatinine 03/27/2022 0.76    • Calcium 03/27/2022 10.0    • AST(SGOT) 03/27/2022 22    • ALT(SGPT) 03/27/2022 24    • Alkaline Phosphatase 03/27/2022 74    • Total Bilirubin 03/27/2022 0.3    • Albumin 03/27/2022 4.8    • Total Protein 03/27/2022 7.2    • Globulin 03/27/2022 2.4    • A-G Ratio 03/27/2022 2.0    • Troponin T 03/27/2022 <6    • Influenza virus A RNA 03/27/2022 Negative    • Influenza virus B, PCR 03/27/2022 Negative    • RSV, PCR 03/27/2022 POSITIVE (A)   • SARS-CoV-2 by PCR 03/27/2022 NotDetected    • SARS-CoV-2 Source 03/27/2022 Nasal Swab    • Lactic Acid 03/27/2022 3.3 (A)   • GFR (CKD-EPI) 03/27/2022 96    • Significant  Indicator 2022 NEG    • Source 2022 BLD    • Site 2022 PERIPHERAL    • Culture Result 2022 No growth after 5 days of incubation.    • Troponin T 2022 <6    • Troponin T 2022 <6    • Report 2022                      Value:[facility]    Test Date:  2022  Pt Name:    RONDA GARCIA               Department: [facility]  MRN:        [id number]                      [facility]  Gender:     Female                       Technician:   :        1970                   Requested By:CANDELARIA PABLO  Order #:    [id number]                    Reading MD: Francesca Churchill MD    Measurements  Intervals                                Axis  Rate:       95                           P:          78  NJ:         144                          QRS:        63  QRSD:       103                          T:          -24  QT:         334  QTc:        420    Interpretive Statements  Sinus rhythm  Probable anteroseptal infarct, old  Nonspecific repol abnormality, diffuse leads  Compared to ECG 10/10/2016 15:44:35  Myocardial infarct finding now present  Early repolarization now present  Electronically Signed On 3- 13:12:04 PDT by Francesca Churchill MD     • WBC 2022 10.0    • RBC 2022 4.64    • Hemoglobin 2022 14.7    • Hematocrit 2022 42.8    • MCV 2022 92.2    • MCH 2022 31.7    • MCHC 2022 34.3    • RDW 2022 39.7    • Platelet Count 2022 303    • MPV 2022 9.9    • Neutrophils-Polys 2022 74.30 (A)   • Lymphocytes 2022 15.90 (A)   • Monocytes 2022 8.80    • Eosinophils 2022 0.00    • Basophils 2022 0.30    • Immature Granulocytes 2022 0.70    • Nucleated RBC 2022 0.00    • Neutrophils (Absolute) 2022 7.41 (A)   • Lymphs (Absolute) 2022 1.59    • Monos (Absolute) 2022 0.88 (A)   • Eos (Absolute) 2022 0.00    • Analiliao  (Absolute) 2022 0.03    • Immature Granulocytes (a* 2022 0.07    • NRBC (Absolute) 2022 0.00    • Sodium 2022 140    • Potassium 2022 4.3    • Chloride 2022 102    • Co2 2022 23    • Anion Gap 2022 15.0    • Glucose 2022 115 (A)   • Bun 2022 17    • Creatinine 2022 0.85    • Calcium 2022 9.7    • AST(SGOT) 2022 29    • ALT(SGPT) 2022 25    • Alkaline Phosphatase 2022 69    • Total Bilirubin 2022 0.4    • Albumin 2022 4.6    • Total Protein 2022 7.3    • Globulin 2022 2.7    • A-G Ratio 2022 1.7    • Magnesium 2022 2.3    • GFR (CKD-EPI) 2022 84    • Eject.Frac. MOD BP 2022 60.08    • Eject.Frac. MOD 4C 2022 61.12    • Eject.Frac. MOD 2C 2022 57.62    • Left Ventrical Ejection * 2022 60    • Report 2022                      [facility]    Test Date:  2022  Pt Name:    RONDA GARCIA               Department: MED  MRN:        [id number]                      Room:       [facility]  Gender:     Female                       Technician: [id number]  :        1970                   Requested By:CATHERINE MEAD  Order #:    [id number]                    Reading MD: Damion Benedict MD    Measurements  Intervals                                Axis  Rate:       76                           P:          77  [place]:         151                          QRS:        64  QRSD:       122                          T:          -10  QT:         363  QTc:        409    Interpretive Statements  Sinus rhythm  INCOMPLETE RIGHT BUNDLE BRANCH BLOCK  Compared to ECG 2022 10:17:51  NO SIGNIFICANT CHANGES  Electronically Signed On 3- 18:21:57 PDT by Damion Benedict MD        Lab Results   Component Value Date/Time    HBA1C 5.6 2022 09:37 AM    HBA1C 5.3 03/15/2021 08:08 AM     Lab Results   Component Value Date/Time    SODIUM 140 2022 11:22 AM     POTASSIUM 4.2 04/09/2022 11:22 AM    CHLORIDE 103 04/09/2022 11:22 AM    CO2 26 04/09/2022 11:22 AM    GLUCOSE 109 (H) 04/09/2022 11:22 AM    BUN 25 (H) 04/09/2022 11:22 AM    CREATININE 0.85 04/09/2022 11:22 AM    BUNCREATRAT 26 (H) 09/14/2020 04:46 AM    ALKPHOSPHAT 87 04/09/2022 11:22 AM    ASTSGOT 16 04/09/2022 11:22 AM    ALTSGPT 18 04/09/2022 11:22 AM    TBILIRUBIN 0.4 04/09/2022 11:22 AM     No results found for: INR  Lab Results   Component Value Date/Time    CHOLSTRLTOT 207 (H) 01/24/2022 09:37 AM     (H) 01/24/2022 09:37 AM    HDL 83 01/24/2022 09:37 AM    TRIGLYCERIDE 47 01/24/2022 09:37 AM       No results found for: TESTOSTERONE  Lab Results   Component Value Date/Time    TSH 1.490 06/19/2020 06:34 AM     No results found for: FREET4  No results found for: URICACID  No components found for: VITB12  Lab Results   Component Value Date/Time    25HYDROXY 38 01/24/2022 09:37 AM    25HYDROXY 50 03/15/2021 08:08 AM   '           Assessment & Plan        1. Intercostal neuritis- rt  Ant lower lat ribs- ? D/t post reactive process      - predniSONE (DELTASONE) 10 MG Tab; Take 1 Tablet by mouth every day.  Dispense: 30 Tablet; Refill: 0    2. Essential hypertension   not at goal; add amlodipine  - hydroCHLOROthiazide (HYDRODIURIL) 25 MG Tab; Take 1 Tablet by mouth every day for 30 days.  Dispense: 90 Tablet; Refill: 3  - amLODIPine (NORVASC) 2.5 MG Tab; Take 1 Tablet by mouth every day.  Dispense: 30 Tablet; Refill: 3    3. RSV (respiratory syncytial virus infection)     resolving

## 2022-05-04 ENCOUNTER — HOSPITAL ENCOUNTER (OUTPATIENT)
Facility: MEDICAL CENTER | Age: 52
End: 2022-05-04
Attending: NURSE PRACTITIONER
Payer: COMMERCIAL

## 2022-05-04 LAB
G LAMBLIA+C PARVUM AG STL QL RAPID: NORMAL
H PYLORI AG STL QL IA: NOT DETECTED
SIGNIFICANT IND 70042: NORMAL
SITE SITE: NORMAL
SOURCE SOURCE: NORMAL

## 2022-05-04 PROCEDURE — 87328 CRYPTOSPORIDIUM AG IA: CPT

## 2022-05-04 PROCEDURE — 87338 HPYLORI STOOL AG IA: CPT

## 2022-05-04 PROCEDURE — 87329 GIARDIA AG IA: CPT

## 2022-05-05 ENCOUNTER — TELEPHONE (OUTPATIENT)
Dept: MEDICAL GROUP | Age: 52
End: 2022-05-05

## 2022-05-05 ENCOUNTER — OFFICE VISIT (OUTPATIENT)
Dept: URGENT CARE | Facility: CLINIC | Age: 52
End: 2022-05-05
Payer: COMMERCIAL

## 2022-05-05 ENCOUNTER — HOSPITAL ENCOUNTER (OUTPATIENT)
Dept: RADIOLOGY | Facility: MEDICAL CENTER | Age: 52
End: 2022-05-05
Attending: NURSE PRACTITIONER
Payer: COMMERCIAL

## 2022-05-05 VITALS
HEIGHT: 67 IN | DIASTOLIC BLOOD PRESSURE: 70 MMHG | BODY MASS INDEX: 22.76 KG/M2 | HEART RATE: 94 BPM | TEMPERATURE: 97.8 F | WEIGHT: 145 LBS | OXYGEN SATURATION: 97 % | SYSTOLIC BLOOD PRESSURE: 144 MMHG | RESPIRATION RATE: 16 BRPM

## 2022-05-05 DIAGNOSIS — M79.662 PAIN OF LEFT CALF: ICD-10-CM

## 2022-05-05 DIAGNOSIS — M79.605 LEFT LEG PAIN: ICD-10-CM

## 2022-05-05 DIAGNOSIS — M54.10 RADICULAR PAIN OF LEFT LOWER EXTREMITY: ICD-10-CM

## 2022-05-05 PROCEDURE — 99213 OFFICE O/P EST LOW 20 MIN: CPT | Performed by: NURSE PRACTITIONER

## 2022-05-05 PROCEDURE — 93971 EXTREMITY STUDY: CPT | Mod: LT

## 2022-05-05 RX ORDER — METHYLPREDNISOLONE 4 MG/1
TABLET ORAL
Qty: 21 TABLET | Refills: 0 | Status: SHIPPED | OUTPATIENT
Start: 2022-05-05 | End: 2022-06-01

## 2022-05-05 RX ORDER — PANTOPRAZOLE SODIUM 40 MG/1
TABLET, DELAYED RELEASE ORAL
COMMUNITY
End: 2022-08-20

## 2022-05-05 RX ORDER — FLUTICASONE PROPIONATE 50 MCG
SPRAY, SUSPENSION (ML) NASAL
COMMUNITY
End: 2023-05-18

## 2022-05-05 RX ORDER — BUDESONIDE AND FORMOTEROL FUMARATE DIHYDRATE 160; 4.5 UG/1; UG/1
AEROSOL RESPIRATORY (INHALATION)
COMMUNITY
End: 2022-08-20

## 2022-05-05 ASSESSMENT — ENCOUNTER SYMPTOMS: LEG PAIN: 1

## 2022-05-05 ASSESSMENT — FIBROSIS 4 INDEX: FIB4 SCORE: 0.55

## 2022-05-05 NOTE — TELEPHONE ENCOUNTER
VOICEMAIL  1. Caller Name: Odalys Stratton                        Call Back Number: [phone] (home)       2. Message:  Patient left message stating she wants to get a CT done of her left leg , she states from leg to calf and foot are throbbing and hurting . She would like to rule out a blood clott . She also wants to send it to Dr Saldana to see has well since she sees him soon . Please advise     3. Patient approves office to leave a detailed voicemail/MyChart message: yes

## 2022-05-05 NOTE — PROGRESS NOTES
Subjective     Odalys Ballard is a 51 y.o. female who presents with Leg Pain (X 5 weeks,calf pain, pulsating, has an order for an ultrasound, for blood clots due to pain on LT calf and back of leg.)            Leg Pain  This is a new problem. Episode onset: pt reports new onset of left leg pain that started about 5 weeks ago. She states the pain and pressure in her left leg has gradually been getting worse. reports the pain is to the back of her left knee and into her left hamstring. Associated symptoms comments: She denies any hx of trauma to LLE. Admits she was sick with a URI right before this leg pain happened, states she is usually very active and when she was sick she was not active. After this is when the pain started. No hx of clotting disorders or DVT. Exacerbated by: she has hx of dissected vertebral artery and has been trying to take a daily baby aspirin for this for a few months. admits she recently stopped this because of GI sxs it was causing. Concerned about DVT secondary to stopping ASA. She has tried nothing for the symptoms.       Review of Systems   Musculoskeletal:        LLE pain and pressure   All other systems reviewed and are negative.         Past Medical History:   Diagnosis Date   • Anesthesia     PONV    • ASTHMA     prn inhaler   • At high risk for breast cancer- prophylactic bilateral mastectomy TBD November 2020 10/1/2020   • Bronchitis 2015   • Heart burn    • Hiatus hernia syndrome    • Hypertension    • Other specified symptom associated with female genital organs     endometriosis, fibroids   • Pneumonia 2010      Past Surgical History:   Procedure Laterality Date   • BREAST RECONSTRUCTION Bilateral 2/11/2021    Procedure: RECONSTRUCTION, BREAST - AND REVISION WITH DERMAL GLANDULAR FLAPS, CAPSULECTOMY;  Surgeon: Amos Dang M.D.;  Location: SURGERY SAME DAY [facility];  Service: Plastics   • EXCISION,FAT GRAFT  2/11/2021    Procedure: EXCISION, FAT GRAFT- FOR FAT GRAFTING;   Surgeon: Amos Dang M.D.;  Location: SURGERY SAME DAY [facility];  Service: Plastics   • TISSUE EXPANDER PLACE/REMOVE Bilateral 2/11/2021    Procedure: INSERTION OR REMOVAL, TISSUE EXPANDER;  Surgeon: Amos Dang M.D.;  Location: SURGERY SAME DAY [facility];  Service: Plastics   • BREAST IMPLANT REVISION Bilateral 2/11/2021    Procedure: INSERTION, IMPLANT, BREAST;  Surgeon: Amos Dang M.D.;  Location: SURGERY SAME DAY [facility];  Service: Plastics   • LIPOSUCTION  2/11/2021    Procedure: LIPOSUCTION-ABDOMEN, FLANKS, HIPS OUTTER THIGHS, AND UPPER BACK;  Surgeon: Amos Dang M.D.;  Location: SURGERY SAME DAY [facility];  Service: Plastics   • PB MASTECTOMY, SIMPLE, COMPLETE Bilateral 11/5/2020    Procedure: MASTECTOMY;  Surgeon: Franny Felix M.D.;  Location: SURGERY [facility];  Service: General   • BREAST RECONSTRUCTION Bilateral 11/5/2020    Procedure: RECONSTRUCTION, BREAST;  Surgeon: Amos Dang M.D.;  Location: [facility];  Service: Plastics   • TISSUE EXPANDER PLACE/REMOVE Bilateral 11/5/2020    Procedure: TISSUE EXPANDER- FOR PLACEMENT  AND USE OF ACELLULAR DERMAL MATRIX;  Surgeon: Amos Dang M.D.;  Location: [facility];  Service: Plastics   • HYSTERECTOMY ROBOTIC N/A 10/17/2016    Procedure: HYSTERECTOMY ROBOTIC, BILATERAL SALPINGECTOMY;  Surgeon: Yamilet Trammell M.D.;  Location: [facility];  Service:    • CYSTOSCOPY N/A 10/17/2016    Procedure: CYSTOSCOPY;  Surgeon: Yamilet Trammell M.D.;  Location: [facility];  Service:    • PELVISCOPY  9/6/2013    Performed by Spencer Matthews M.D. at [facility]   • CHOLECYSTECTOMY  2009    laparoscopic   • LAPAROSCOPY  2000, 2008      Social History     Socioeconomic History   • Marital status:      Spouse name: Not on file   • Number of children: Not on file   • Years of education: Not on file   • Highest education level: Not on file   Occupational History  "  • Not on file   Tobacco Use   • Smoking status: Never Smoker   • Smokeless tobacco: Never Used   Vaping Use   • Vaping Use: Never used   Substance and Sexual Activity   • Alcohol use: Not Currently     Comment: 2 per week   • Drug use: No   • Sexual activity: Not on file   Other Topics Concern   • Not on file   Social History Narrative   • Not on file     Social Determinants of Health     Financial Resource Strain: Not on file   Food Insecurity: Not on file   Transportation Needs: Not on file   Physical Activity: Not on file   Stress: Not on file   Social Connections: Not on file   Intimate Partner Violence: Not on file   Housing Stability: Not on file         Objective     /70   Pulse 94   Temp 36.6 °C (97.8 °F) (Temporal)   Resp 16   Ht 1.702 m (5' 7\")   Wt 65.8 kg (145 lb)   LMP 10/08/2016   SpO2 97%   Breastfeeding No   BMI 22.71 kg/m²      Physical Exam  Vitals and nursing note reviewed.   Constitutional:       Appearance: Normal appearance. She is normal weight.   HENT:      Head: Normocephalic and atraumatic.      Nose: Nose normal.      Mouth/Throat:      Mouth: Mucous membranes are moist.      Pharynx: Oropharynx is clear.   Eyes:      Extraocular Movements: Extraocular movements intact.      Pupils: Pupils are equal, round, and reactive to light.   Cardiovascular:      Rate and Rhythm: Normal rate and regular rhythm.   Pulmonary:      Effort: Pulmonary effort is normal.   Musculoskeletal:         General: Normal range of motion.      Cervical back: Normal range of motion.        Legs:    Skin:     General: Skin is warm and dry.      Capillary Refill: Capillary refill takes less than 2 seconds.   Neurological:      General: No focal deficit present.      Mental Status: She is alert and oriented to person, place, and time. Mental status is at baseline.   Psychiatric:         Mood and Affect: Mood normal.         Speech: Speech normal.         Thought Content: Thought content normal.         " Judgment: Judgment normal.                5/5/2022 4:59 PM     HISTORY/REASON FOR EXAM:  Left lower extremity pain        TECHNIQUE/EXAM DESCRIPTION: Left lower extremity Doppler venous ultrasound was performed.     COMPARISON:  None.     FINDINGS:     REAL-TIME GRAY-SCALE IMAGING:  Real-time gray-scale imaging reveals no evidence of focal wall thickening.     COLOR AND DUPLEX DOPPLER IMAGING:  Using both color and pulse-wave Doppler imaging, multiple images were obtained from the common femoral vein distally to a level below the knee. Graded compression was used to demonstrate patent lumens. There is no evidence of luminal thrombus in the   extremity. There is normal augmentation to flow and respiratory variation.     IMPRESSION:     No evidence of left lower extremity deep venous thrombosis.             Assessment & Plan        1. Left leg pain  - US-EXTREMITY VENOUS LOWER UNILAT LEFT; Future    2. Radicular pain of left lower extremity  - methylPREDNISolone (MEDROL DOSEPAK) 4 MG Tablet Therapy Pack; Follow schedule on package instructions.  Dispense: 21 Tablet; Refill: 0    Discussed US results  Since there is no evidence of DVT, I have discussed with patient the presentation of nerve pain can feel similar  Possible impinged nerve from a few weeks ago when she was down with a URI  She understands and agrees  Will trial steroids and monitor for improvement of pain  She can also use tylenol and ibuprofen as needed  Encouraged light exercise to help circulation and improve pain  Please follow up with PCP next week  Supportive care, differential diagnoses, and indications for immediate follow-up discussed with patient.    Pathogenesis of diagnosis discussed including typical length and natural progression.      Instructed to return to  or nearest emergency department if symptoms fail to improve, for any change in condition, further concerns, or new concerning symptoms.  Patient states understanding of the plan of  care and discharge instructions.

## 2022-05-06 NOTE — TELEPHONE ENCOUNTER
Called and spoke to pt. She was able to get US of LLE on 5/5 which was negative for DVT. Pt has f/u with her vascular provider for leg pain. Pt also provided with a steroid taper to help reduce inflammation in her leg.

## 2022-05-22 ENCOUNTER — TELEPHONE (OUTPATIENT)
Dept: SLEEP MEDICINE | Facility: MEDICAL CENTER | Age: 52
End: 2022-05-22
Payer: COMMERCIAL

## 2022-05-22 NOTE — TELEPHONE ENCOUNTER
Pt called re: covid + with symptoms with last 24 hrs  + asthma hx      Dosage: 300 mg nirmatrelvir (two 150 mg tablets) with 100 mg ritonavir (one 100 mg tablet), with all three tablets taken together twice daily for 5 days.     Pts kidney function is intact and also not a a statin

## 2022-05-22 NOTE — TELEPHONE ENCOUNTER
Covid +    Pt called re: positive symptoms within 24 hrs  + Covid   also positive  Requesting Paxlovid

## 2022-05-26 ENCOUNTER — PATIENT MESSAGE (OUTPATIENT)
Dept: SLEEP MEDICINE | Facility: MEDICAL CENTER | Age: 52
End: 2022-05-26
Payer: COMMERCIAL

## 2022-06-01 ENCOUNTER — OFFICE VISIT (OUTPATIENT)
Dept: MEDICAL GROUP | Age: 52
End: 2022-06-01
Payer: COMMERCIAL

## 2022-06-01 ENCOUNTER — HOSPITAL ENCOUNTER (OUTPATIENT)
Dept: RADIOLOGY | Facility: MEDICAL CENTER | Age: 52
End: 2022-06-01
Attending: PLASTIC SURGERY
Payer: COMMERCIAL

## 2022-06-01 VITALS
OXYGEN SATURATION: 94 % | HEIGHT: 67 IN | DIASTOLIC BLOOD PRESSURE: 86 MMHG | BODY MASS INDEX: 23.54 KG/M2 | HEART RATE: 99 BPM | RESPIRATION RATE: 16 BRPM | SYSTOLIC BLOOD PRESSURE: 120 MMHG | WEIGHT: 150 LBS | TEMPERATURE: 98.6 F

## 2022-06-01 DIAGNOSIS — E78.5 DYSLIPIDEMIA: ICD-10-CM

## 2022-06-01 DIAGNOSIS — B33.8 RSV (RESPIRATORY SYNCYTIAL VIRUS INFECTION): ICD-10-CM

## 2022-06-01 DIAGNOSIS — I10 ESSENTIAL HYPERTENSION: ICD-10-CM

## 2022-06-01 DIAGNOSIS — R07.89 COSTOCHONDRAL CHEST PAIN: ICD-10-CM

## 2022-06-01 DIAGNOSIS — R73.01 IFG (IMPAIRED FASTING GLUCOSE): ICD-10-CM

## 2022-06-01 DIAGNOSIS — Z80.51 FAMILY HISTORY OF MALIGNANT NEOPLASM OF KIDNEY: ICD-10-CM

## 2022-06-01 DIAGNOSIS — N64.4 MASTODYNIA: ICD-10-CM

## 2022-06-01 DIAGNOSIS — I88.9 CERVICAL LYMPHADENITIS: ICD-10-CM

## 2022-06-01 DIAGNOSIS — Z98.82 STATUS POST BILATERAL BREAST IMPLANTS: ICD-10-CM

## 2022-06-01 PROBLEM — R68.89: Status: RESOLVED | Noted: 2020-10-01 | Resolved: 2022-06-01

## 2022-06-01 PROCEDURE — C8908 MRI W/O FOL W/CONT, BREAST,: HCPCS

## 2022-06-01 PROCEDURE — 99214 OFFICE O/P EST MOD 30 MIN: CPT | Performed by: INTERNAL MEDICINE

## 2022-06-01 PROCEDURE — A9576 INJ PROHANCE MULTIPACK: HCPCS | Performed by: SURGERY

## 2022-06-01 PROCEDURE — 700117 HCHG RX CONTRAST REV CODE 255: Performed by: SURGERY

## 2022-06-01 RX ORDER — HYDROCHLOROTHIAZIDE 25 MG/1
25 TABLET ORAL DAILY
Qty: 90 TABLET | Refills: 3 | Status: SHIPPED | OUTPATIENT
Start: 2022-06-01 | End: 2022-08-31 | Stop reason: SDUPTHER

## 2022-06-01 RX ORDER — AMOXICILLIN 500 MG/1
500 CAPSULE ORAL 3 TIMES DAILY
Qty: 30 CAPSULE | Refills: 0 | Status: SHIPPED | DISCHARGE
Start: 2022-06-01 | End: 2022-08-20

## 2022-06-01 RX ADMIN — GADOTERIDOL 20 ML: 279.3 INJECTION, SOLUTION INTRAVENOUS at 16:41

## 2022-06-01 ASSESSMENT — FIBROSIS 4 INDEX: FIB4 SCORE: 0.55

## 2022-06-01 ASSESSMENT — ENCOUNTER SYMPTOMS
RESPIRATORY NEGATIVE: 1
CONSTITUTIONAL NEGATIVE: 1
PSYCHIATRIC NEGATIVE: 1
MUSCULOSKELETAL NEGATIVE: 1
EYES NEGATIVE: 1
CARDIOVASCULAR NEGATIVE: 1
GASTROINTESTINAL NEGATIVE: 1
NEUROLOGICAL NEGATIVE: 1

## 2022-06-01 NOTE — PROGRESS NOTES
Subjective     Odalys Ballard is a 51 y.o. female who presents with Follow-Up (From having RSV )  And the patient is here for follow-up of her recent acute costochondritis, and hypertension.  She got over costochondritis quite well with a course of steroids.  She is slowly recovering from her RSV which was complicated by COVID-19 infection despite being vaccinated.  She responded well to Paxlovid, without any rebound infection.  Also the left breast implant which apparently had possibly ruptured is now felt that this the possible stitch that came loose and cause some swelling in that area and she will get an MRI of the breast and follow-up with her plastic surgeon for this in future.    Today she seems to be slowly getting back to normal is feeling back to her usual self.  Her home BPs have been well controlled less than 130 systolic on her current regimen of  amlodipine 2.5 mg daily, and hydrochlorothiazide 25 mg daily.  Combination of these 2 are working well for her.  She had briefly stopped the hydrochlorothiazide only to resume it in the last few weeks.   Outpatient Medications Prior to Visit   Medication Sig Dispense Refill   • baclofen (LIORESAL) 10 MG Tab TAKE 1 TABLET BY MOUTH ONCE DAILY AS NEEDED 90 Tablet 3   • fluticasone (FLONASE) 50 MCG/ACT nasal spray fluticasone propionate 50 mcg/actuation nasal spray,suspension   SHAKE LIQUID AND USE 2 SPRAYS IN EACH NOSTRIL EVERY DAY     • budesonide-formoterol (SYMBICORT) 160-4.5 MCG/ACT Aerosol Symbicort 80 mcg-4.5 mcg/actuation HFA aerosol inhaler     • amLODIPine (NORVASC) 2.5 MG Tab Take 1 Tablet by mouth every day. 30 Tablet 3   • Eszopiclone 3 MG Tab Take 1 Tablet by mouth every evening for 90 days. 90 Tablet 1   • rizatriptan (MAXALT) 10 MG tablet TAKE 1 TABLET BY MOUTH ONCE DAILY AS NEEDED FOR MIGRAINE HEADACHE (Patient taking differently: Take 10 mg by mouth one time as needed for Migraine.) 12 Tablet 5   • albuterol 108 (90 Base) MCG/ACT Aero Soln  "inhalation aerosol Inhale 2 Puffs by mouth every 6 hours as needed for Shortness of Breath. 8.5 g 11   • pantoprazole (PROTONIX) 40 MG Tablet Delayed Response pantoprazole 40 mg tablet,delayed release     • methylPREDNISolone (MEDROL DOSEPAK) 4 MG Tablet Therapy Pack Follow schedule on package instructions. 21 Tablet 0   • budesonide (PULMICORT) 0.5 MG/2ML Suspension Take 2 mL by nebulization 2 times a day. Rinse mouth after each use. (Patient not taking: No sig reported) 60 mL 11     No facility-administered medications prior to visit.     Problem   Cervical Lymphadenitis   Costochondral Chest Pain   Excessive Sodium Intake (Resolved)               HPI    Review of Systems   Constitutional: Negative.    HENT: Negative.    Eyes: Negative.    Respiratory: Negative.    Cardiovascular: Negative.    Gastrointestinal: Negative.    Genitourinary: Negative.    Musculoskeletal: Negative.    Skin: Negative.    Neurological: Negative.    Endo/Heme/Allergies: Negative.    Psychiatric/Behavioral: Negative.               Objective     /86 (BP Location: Left arm, Patient Position: Sitting, BP Cuff Size: Adult)   Pulse 99   Temp 37 °C (98.6 °F) (Temporal)   Resp 16   Ht 1.702 m (5' 7\")   Wt 68 kg (150 lb)   LMP 10/08/2016   SpO2 94%   BMI 23.49 kg/m²      Physical Exam  Vitals reviewed.   Constitutional:       General: She is not in acute distress.     Appearance: She is well-developed. She is not diaphoretic.   HENT:      Head: Normocephalic and atraumatic.      Right Ear: External ear normal.      Left Ear: External ear normal.      Nose: Nose normal.      Mouth/Throat:      Pharynx: No oropharyngeal exudate.   Eyes:      General: No scleral icterus.        Right eye: No discharge.         Left eye: No discharge.      Conjunctiva/sclera: Conjunctivae normal.      Pupils: Pupils are equal, round, and reactive to light.   Neck:      Thyroid: No thyromegaly.      Vascular: No JVD.      Trachea: No tracheal deviation. "   Cardiovascular:      Rate and Rhythm: Normal rate and regular rhythm.      Heart sounds: Normal heart sounds. No murmur heard.    No friction rub. No gallop.   Pulmonary:      Effort: Pulmonary effort is normal. No respiratory distress.      Breath sounds: Normal breath sounds. No stridor. No wheezing or rales.   Chest:      Chest wall: No tenderness.   Abdominal:      General: Bowel sounds are normal. There is no distension.      Palpations: Abdomen is soft. There is no mass.      Tenderness: There is no abdominal tenderness. There is no guarding or rebound.   Musculoskeletal:         General: No tenderness. Normal range of motion.      Cervical back: Normal range of motion and neck supple.   Lymphadenopathy:      Cervical: No cervical adenopathy.   Skin:     General: Skin is warm and dry.      Coloration: Skin is not pale.      Findings: No erythema or rash.   Neurological:      Mental Status: She is alert and oriented to person, place, and time.      Cranial Nerves: No cranial nerve deficit.      Motor: No abnormal muscle tone.      Coordination: Coordination normal.      Deep Tendon Reflexes: Reflexes are normal and symmetric. Reflexes normal.   Psychiatric:         Behavior: Behavior normal.         Thought Content: Thought content normal.         Judgment: Judgment normal.                  Hospital Outpatient Visit on 05/04/2022   Component Date Value   • H Pylori Ag Stool E 05/04/2022 Not detected    • Significant Indicator 05/04/2022 NEG    • Source 05/04/2022 STL    • Site 05/04/2022 -    • Ova And Parasites Antige* 05/04/2022                      Value:Negative for Giardia lamblia antigen.  Negative for Cryptosporidium parvum antigen.  NOTE:  The Cryptosporidium/Giardia assay is a rapid test for the  presence or absence of these specific antigens.  In special  circumstances, a physician may need to request a complete  ova and parasite procedure when the patient meets certain  criteria. For example,  recent travel abroad,immunosupression,  recent immigration, persistent undiagnosed diarrhea, or  persistent unexplained eosinophilia may be conditions to  warrant a complete ova and parasite examination.  In these  special cases, or if the physician suspects another specific  gastrointestinal parasite,the Microbiology Department can  perform a complete ova and parasite microscopic examination.  The request for a complete ova and parasite examination must  come directly from the physician (or designee) within the  seven days of the original stool specimen being received in  the Microbiology Department.  Stool specimens are discarded  after                           seven days of storage.      '  Lab Results   Component Value Date/Time    HBA1C 5.6 01/24/2022 09:37 AM    HBA1C 5.3 03/15/2021 08:08 AM     Lab Results   Component Value Date/Time    SODIUM 140 04/09/2022 11:22 AM    POTASSIUM 4.2 04/09/2022 11:22 AM    CHLORIDE 103 04/09/2022 11:22 AM    CO2 26 04/09/2022 11:22 AM    GLUCOSE 109 (H) 04/09/2022 11:22 AM    BUN 25 (H) 04/09/2022 11:22 AM    CREATININE 0.85 04/09/2022 11:22 AM    BUNCREATRAT 26 (H) 09/14/2020 04:46 AM    ALKPHOSPHAT 87 04/09/2022 11:22 AM    ASTSGOT 16 04/09/2022 11:22 AM    ALTSGPT 18 04/09/2022 11:22 AM    TBILIRUBIN 0.4 04/09/2022 11:22 AM     No results found for: INR  Lab Results   Component Value Date/Time    CHOLSTRLTOT 207 (H) 01/24/2022 09:37 AM     (H) 01/24/2022 09:37 AM    HDL 83 01/24/2022 09:37 AM    TRIGLYCERIDE 47 01/24/2022 09:37 AM       No results found for: TESTOSTERONE  Lab Results   Component Value Date/Time    TSH 1.490 06/19/2020 06:34 AM     No results found for: FREET4  No results found for: URICACID  No components found for: VITB12  Lab Results   Component Value Date/Time    25HYDROXY 38 01/24/2022 09:37 AM    25HYDROXY 50 03/15/2021 08:08 AM   '               Assessment & Plan        1. Essential hypertension    Under good control. Continue same regimen.   Of amlodipine 2.5 mg daily plus:  - hydroCHLOROthiazide (HYDRODIURIL) 25 MG Tab; Take 1 Tablet by mouth every day for 30 days.  Dispense: 90 Tablet; Refill: 3    2. Cervical lymphadenitis  New problem.  Some slight swelling in the right side of her neck for which her ENT doctor evaluated her and placed her on antibiotics with amoxicillin for 7 to 10 days.  She had a recurrence of symptoms after she got better and is resuming a second course of this now.  Concerned she might have a canal infection we will follow-up with her antibiotics for this.  - amoxicillin (AMOXIL) 500 MG Cap; Take 1 Capsule by mouth 3 times a day.  Dispense: 30 Capsule; Refill: 0    3. RSV (respiratory syncytial virus infection)  This has resolved with no further cough fatigue fever chills or dyspnea.    4. IFG (impaired fasting glucose)  Under good control.  Continue high-protein low-carb diet.    5. Dyslipidemia  Good control continue high-protein low-carb, and now is in exercise.    6. Costochondral chest pain  This has completely resolved.  Steroids completed.  No further chest pain.

## 2022-06-15 ENCOUNTER — HOSPITAL ENCOUNTER (OUTPATIENT)
Dept: RADIOLOGY | Facility: MEDICAL CENTER | Age: 52
End: 2022-06-15
Attending: SURGERY
Payer: COMMERCIAL

## 2022-06-15 DIAGNOSIS — R92.8 ABNORMAL MAMMOGRAM: ICD-10-CM

## 2022-06-15 PROCEDURE — 76642 ULTRASOUND BREAST LIMITED: CPT | Mod: RT

## 2022-06-15 PROCEDURE — G0279 TOMOSYNTHESIS, MAMMO: HCPCS

## 2022-06-15 PROCEDURE — 77065 DX MAMMO INCL CAD UNI: CPT | Mod: RT

## 2022-07-06 DIAGNOSIS — K20.0 EOSINOPHILIC ESOPHAGITIS: ICD-10-CM

## 2022-07-25 ENCOUNTER — HOSPITAL ENCOUNTER (OUTPATIENT)
Dept: RADIOLOGY | Facility: MEDICAL CENTER | Age: 52
End: 2022-07-25
Attending: SURGERY
Payer: COMMERCIAL

## 2022-07-25 ENCOUNTER — HOSPITAL ENCOUNTER (OUTPATIENT)
Dept: LAB | Facility: MEDICAL CENTER | Age: 52
End: 2022-07-25
Attending: NURSE PRACTITIONER
Payer: COMMERCIAL

## 2022-07-25 ENCOUNTER — HOSPITAL ENCOUNTER (OUTPATIENT)
Dept: RADIOLOGY | Facility: MEDICAL CENTER | Age: 52
End: 2022-07-25
Attending: NURSE PRACTITIONER
Payer: COMMERCIAL

## 2022-07-25 DIAGNOSIS — I77.74 DISSECTION OF VERTEBRAL ARTERY (HCC): ICD-10-CM

## 2022-07-25 DIAGNOSIS — K76.89 COMPENSATORY LOBAR HYPERPLASIA OF LIVER: ICD-10-CM

## 2022-07-25 DIAGNOSIS — R10.9 STOMACH ACHE: ICD-10-CM

## 2022-07-25 LAB
ALBUMIN SERPL BCP-MCNC: 5 G/DL (ref 3.2–4.9)
ALP SERPL-CCNC: 69 U/L (ref 30–99)
ALT SERPL-CCNC: 17 U/L (ref 2–50)
AST SERPL-CCNC: 20 U/L (ref 12–45)
BILIRUB CONJ SERPL-MCNC: <0.2 MG/DL (ref 0.1–0.5)
BILIRUB INDIRECT SERPL-MCNC: ABNORMAL MG/DL (ref 0–1)
BILIRUB SERPL-MCNC: 0.4 MG/DL (ref 0.1–1.5)
PROT SERPL-MCNC: 7.4 G/DL (ref 6–8.2)

## 2022-07-25 PROCEDURE — 80076 HEPATIC FUNCTION PANEL: CPT

## 2022-07-25 PROCEDURE — 93922 UPR/L XTREMITY ART 2 LEVELS: CPT | Mod: 26 | Performed by: INTERNAL MEDICINE

## 2022-07-25 PROCEDURE — 93880 EXTRACRANIAL BILAT STUDY: CPT | Mod: 26 | Performed by: INTERNAL MEDICINE

## 2022-07-25 PROCEDURE — 36415 COLL VENOUS BLD VENIPUNCTURE: CPT

## 2022-07-25 PROCEDURE — 93922 UPR/L XTREMITY ART 2 LEVELS: CPT

## 2022-07-25 PROCEDURE — 93880 EXTRACRANIAL BILAT STUDY: CPT

## 2022-07-25 PROCEDURE — 76700 US EXAM ABDOM COMPLETE: CPT

## 2022-08-19 ENCOUNTER — OFFICE VISIT (OUTPATIENT)
Dept: SLEEP MEDICINE | Facility: MEDICAL CENTER | Age: 52
End: 2022-08-19
Payer: COMMERCIAL

## 2022-08-19 ENCOUNTER — HOSPITAL ENCOUNTER (OUTPATIENT)
Dept: PULMONOLOGY | Facility: MEDICAL CENTER | Age: 52
End: 2022-08-19
Attending: INTERNAL MEDICINE
Payer: COMMERCIAL

## 2022-08-19 VITALS
HEART RATE: 87 BPM | HEIGHT: 67 IN | BODY MASS INDEX: 24.39 KG/M2 | WEIGHT: 155.38 LBS | OXYGEN SATURATION: 95 % | SYSTOLIC BLOOD PRESSURE: 136 MMHG | DIASTOLIC BLOOD PRESSURE: 82 MMHG

## 2022-08-19 DIAGNOSIS — J45.30 MILD PERSISTENT ASTHMA WITHOUT COMPLICATION: ICD-10-CM

## 2022-08-19 DIAGNOSIS — S22.42XD CLOSED FRACTURE OF MULTIPLE RIBS OF LEFT SIDE WITH ROUTINE HEALING, SUBSEQUENT ENCOUNTER: ICD-10-CM

## 2022-08-19 PROCEDURE — 94060 EVALUATION OF WHEEZING: CPT | Mod: 26 | Performed by: INTERNAL MEDICINE

## 2022-08-19 PROCEDURE — 94729 DIFFUSING CAPACITY: CPT

## 2022-08-19 PROCEDURE — 99213 OFFICE O/P EST LOW 20 MIN: CPT | Performed by: INTERNAL MEDICINE

## 2022-08-19 PROCEDURE — 94729 DIFFUSING CAPACITY: CPT | Mod: 26 | Performed by: INTERNAL MEDICINE

## 2022-08-19 PROCEDURE — 94726 PLETHYSMOGRAPHY LUNG VOLUMES: CPT

## 2022-08-19 PROCEDURE — 94060 EVALUATION OF WHEEZING: CPT

## 2022-08-19 PROCEDURE — 94726 PLETHYSMOGRAPHY LUNG VOLUMES: CPT | Mod: 26 | Performed by: INTERNAL MEDICINE

## 2022-08-19 RX ORDER — ALBUTEROL SULFATE 2.5 MG/3ML
2.5 SOLUTION RESPIRATORY (INHALATION)
Status: DISCONTINUED | OUTPATIENT
Start: 2022-08-19 | End: 2022-08-20 | Stop reason: HOSPADM

## 2022-08-19 RX ADMIN — ALBUTEROL SULFATE 2.5 MG: 2.5 SOLUTION RESPIRATORY (INHALATION) at 13:40

## 2022-08-19 ASSESSMENT — FIBROSIS 4 INDEX: FIB4 SCORE: 0.71

## 2022-08-19 ASSESSMENT — PULMONARY FUNCTION TESTS
FVC_PREDICTED: 3.86
FEV1_LLN: 2.57
FEV1/FVC_PERCENT_PREDICTED: 80
FEV1/FVC_PERCENT_LLN: 67
FVC_PERCENT_PREDICTED: 93
FEV1_PERCENT_PREDICTED: 91
FEV1/FVC_PERCENT_PREDICTED: 97
FEV1/FVC: 78
FEV1_PERCENT_CHANGE: 1
FVC_LLN: 3.22
FEV1_LLN: 2.57
FEV1/FVC_PERCENT_CHANGE: 50
FEV1/FVC_PREDICTED: 80
FEV1/FVC: 78
FEV1/FVC_PERCENT_PREDICTED: 97
FEV1_PERCENT_PREDICTED: 93
FVC_PERCENT_PREDICTED: 96
FEV1_PERCENT_CHANGE: 2
FEV1/FVC_PERCENT_LLN: 67
FEV1_PREDICTED: 3.08
FEV1/FVC_PERCENT_CHANGE: 0
FEV1/FVC: 78
FEV1: 2.88
FEV1/FVC: 77.63
FVC_LLN: 3.22
FEV1: 2.83
FEV1/FVC_PERCENT_PREDICTED: 96
FVC: 3.61
FEV1/FVC_PERCENT_PREDICTED: 98
FVC: 3.71

## 2022-08-19 NOTE — PROGRESS NOTES
Pulmonary Clinic follow up    Date of Service: 8/19/2022    Reason for follow up:  Asthma (Last seen 04/01/22) and Results (PFT 08/19/22, Labs 04/09/22)      Problem List Items Addressed This Visit       Mild persistent asthma without complication     Markedly worsened with RSV  Now improved but based on symptoms do think she does need maintenance therapy   Will trial Anoro as no steroids and she how she fares  Continue antihistamine and Sinus protocol  Follow up with Allergist  Follow GERD behavioral modification diet as GERD will worsen asthma           Relevant Medications    umeclidinium-vilanterol (ANORO ELLIPTA) 62.5-25 MCG/INH AEROSOL POWDER, BREATH ACTIVATED inhaler    Fracture of multiple ribs of left side with routine healing     Post RSV infection  With the coughing pt broke two ribs  Pt will d/w PCP if she needs a DEXA scan to evaluate for osteroporosis          Pt will let us know how she is faring with Anoro and will arrange follow up    History of Present Illness: Odalys Ballard is a 51 y.o. female with a past medical history of moderate persistent asthma that markedly worsened after RSV in 3/2022 where she required hospitalization.  Her cough was makayla severe that she had two rib fractures  Her wheezing and cough lasted almost 8 weeks  She then developed dysphagia and saw GI  Found to have small hiatal hernia and bx of the esophagus was concerning for eosinophilic esophagitis  She does have extensive allergies to the environment and with severe allergic rhinitis.  Referred her to Dr. Mills who she will see in November.  She is taking claritin and has a strict sinus regiment  She stopped symbicort as she she is having worsening pain on the side of her throat  Shehas noticed her breathing is worse when she is at home than when she is out and about   She also is having dysphagia with large PPI pills  She did notice the cough got better with PPI  She is not on a GERD diet  She has also put on 20 pounds      PFTs done today look great and are absolutely normal         Review of Systems   Constitutional: Negative.    HENT:  Positive for congestion.    Eyes: Negative.    Respiratory: Negative.     Cardiovascular: Negative.    Gastrointestinal:  Positive for heartburn.   Genitourinary: Negative.    Musculoskeletal: Negative.    Skin: Negative.    Neurological: Negative.    Endo/Heme/Allergies: Negative.    Psychiatric/Behavioral: Negative.       Current Outpatient Medications on File Prior to Visit   Medication Sig Dispense Refill    baclofen (LIORESAL) 10 MG Tab TAKE 1 TABLET BY MOUTH ONCE DAILY AS NEEDED 90 Tablet 3    fluticasone (FLONASE) 50 MCG/ACT nasal spray fluticasone propionate 50 mcg/actuation nasal spray,suspension   SHAKE LIQUID AND USE 2 SPRAYS IN EACH NOSTRIL EVERY DAY      amLODIPine (NORVASC) 2.5 MG Tab Take 1 Tablet by mouth every day. 30 Tablet 3    rizatriptan (MAXALT) 10 MG tablet TAKE 1 TABLET BY MOUTH ONCE DAILY AS NEEDED FOR MIGRAINE HEADACHE (Patient taking differently: Take 10 mg by mouth one time as needed for Migraine.) 12 Tablet 5    albuterol 108 (90 Base) MCG/ACT Aero Soln inhalation aerosol Inhale 2 Puffs by mouth every 6 hours as needed for Shortness of Breath. 8.5 g 11     No current facility-administered medications on file prior to visit.       Social History     Tobacco Use    Smoking status: Never    Smokeless tobacco: Never   Vaping Use    Vaping Use: Never used   Substance Use Topics    Alcohol use: Not Currently     Comment: 2 per week    Drug use: No        Past Medical History:   Diagnosis Date    Anesthesia     PONV     ASTHMA     prn inhaler    At high risk for breast cancer- prophylactic bilateral mastectomy TBD November 2020 10/1/2020    Bronchitis 2015    Excessive sodium intake 10/1/2020    Heart burn     Hiatus hernia syndrome     Hypertension     Other specified symptom associated with female genital organs     endometriosis, fibroids    Pneumonia 2010       Past  Surgical History:   Procedure Laterality Date    BREAST RECONSTRUCTION Bilateral 2/11/2021    Procedure: RECONSTRUCTION, BREAST - AND REVISION WITH DERMAL GLANDULAR FLAPS, CAPSULECTOMY;  Surgeon: Amos Dang M.D.;  Location: SURGERY SAME DAY [facility];  Service: Plastics    EXCISION,FAT GRAFT  2/11/2021    Procedure: EXCISION, FAT GRAFT- FOR FAT GRAFTING;  Surgeon: Amos Dang M.D.;  Location: SURGERY SAME DAY [facility];  Service: Plastics    TISSUE EXPANDER PLACE/REMOVE Bilateral 2/11/2021    Procedure: INSERTION OR REMOVAL, TISSUE EXPANDER;  Surgeon: Amos Dang M.D.;  Location: SURGERY SAME DAY [facility];  Service: Plastics    BREAST IMPLANT REVISION Bilateral 2/11/2021    Procedure: INSERTION, IMPLANT, BREAST;  Surgeon: Amos Dang M.D.;  Location: SURGERY SAME DAY [facility];  Service: Plastics    LIPOSUCTION  2/11/2021    Procedure: LIPOSUCTION-ABDOMEN, FLANKS, HIPS OUTTER THIGHS, AND UPPER BACK;  Surgeon: Amos Dang M.D.;  Location: SURGERY SAME DAY [facility];  Service: Plastics    PB MASTECTOMY, SIMPLE, COMPLETE Bilateral 11/5/2020    Procedure: MASTECTOMY;  Surgeon: Franny Felix M.D.;  Location: [facility];  Service: General    BREAST RECONSTRUCTION Bilateral 11/5/2020    Procedure: RECONSTRUCTION, BREAST;  Surgeon: Amos Dang M.D.;  Location: [facility];  Service: Plastics    TISSUE EXPANDER PLACE/REMOVE Bilateral 11/5/2020    Procedure: TISSUE EXPANDER- FOR PLACEMENT  AND USE OF ACELLULAR DERMAL MATRIX;  Surgeon: Amos Dang M.D.;  Location: [facility];  Service: Plastics    HYSTERECTOMY ROBOTIC N/A 10/17/2016    Procedure: HYSTERECTOMY ROBOTIC, BILATERAL SALPINGECTOMY;  Surgeon: Yamilet Trammell M.D.;  Location: [facility];  Service:     CYSTOSCOPY N/A 10/17/2016    Procedure: CYSTOSCOPY;  Surgeon: Yamilet Trammell M.D.;  Location: [facility];  Service:     PELVISCOPY  9/6/2013    Performed by  "Spencer Matthews M.D. at SURGERY [facility]S    CHOLECYSTECTOMY  2009    laparoscopic    LAPAROSCOPY  2000, 2008       Allergies: Lisinopril and Losartan    Family History   Problem Relation Age of Onset    Diabetes Other     Heart Disease Other     Hypertension Other     Cancer Other        Vitals:    08/19/22 1423   Height: 1.702 m (5' 7\")   Weight: 70.5 kg (155 lb 6 oz)   Weight % change since last entry.: 0 %   BP: 136/82   Pulse: 87   BMI (Calculated): 24.34       Physical Examination  Physical Exam  Constitutional:       Appearance: Normal appearance.   HENT:      Head: Normocephalic and atraumatic.   Cardiovascular:      Rate and Rhythm: Normal rate and regular rhythm.   Pulmonary:      Effort: Pulmonary effort is normal.      Breath sounds: Normal breath sounds.   Musculoskeletal:         General: Normal range of motion.      Cervical back: Normal range of motion.   Skin:     General: Skin is warm and dry.   Neurological:      General: No focal deficit present.      Mental Status: She is alert and oriented to person, place, and time.   Psychiatric:         Mood and Affect: Mood normal.         Behavior: Behavior normal.         Thought Content: Thought content normal.         Judgment: Judgment normal.            Tyler Henriquez M.D., MD MPH [facility]  [facility]  "

## 2022-08-20 PROBLEM — S22.42XD FRACTURE OF MULTIPLE RIBS OF LEFT SIDE WITH ROUTINE HEALING: Status: ACTIVE | Noted: 2022-08-20

## 2022-08-20 ASSESSMENT — ENCOUNTER SYMPTOMS
NEUROLOGICAL NEGATIVE: 1
MUSCULOSKELETAL NEGATIVE: 1
PSYCHIATRIC NEGATIVE: 1
RESPIRATORY NEGATIVE: 1
HEARTBURN: 1
CARDIOVASCULAR NEGATIVE: 1
CONSTITUTIONAL NEGATIVE: 1
EYES NEGATIVE: 1

## 2022-08-21 NOTE — ASSESSMENT & PLAN NOTE
Markedly worsened with RSV  Now improved but based on symptoms do think she does need maintenance therapy   Will trial Anoro as no steroids and she how she fares  Continue antihistamine and Sinus protocol  Follow up with Allergist  Follow GERD behavioral modification diet as GERD will worsen asthma

## 2022-08-21 NOTE — ASSESSMENT & PLAN NOTE
Post RSV infection  With the coughing pt broke two ribs  Pt will d/w PCP if she needs a DEXA scan to evaluate for osteroporosis

## 2022-08-22 NOTE — PROGRESS NOTES
Pended RX, Sample for Anoro.     Called and spoke with pt. Pt will  at [facility] on Wednesday.

## 2022-08-31 ENCOUNTER — OFFICE VISIT (OUTPATIENT)
Dept: MEDICAL GROUP | Age: 52
End: 2022-08-31
Payer: COMMERCIAL

## 2022-08-31 VITALS
SYSTOLIC BLOOD PRESSURE: 112 MMHG | BODY MASS INDEX: 24.04 KG/M2 | HEART RATE: 102 BPM | TEMPERATURE: 98 F | WEIGHT: 153.2 LBS | OXYGEN SATURATION: 99 % | DIASTOLIC BLOOD PRESSURE: 76 MMHG | HEIGHT: 67 IN | RESPIRATION RATE: 16 BRPM

## 2022-08-31 DIAGNOSIS — I10 ESSENTIAL HYPERTENSION: ICD-10-CM

## 2022-08-31 DIAGNOSIS — M81.6 LOCALIZED OSTEOPOROSIS, UNSPECIFIED PATHOLOGICAL FRACTURE PRESENCE: ICD-10-CM

## 2022-08-31 DIAGNOSIS — M79.89 SWELLING OF THIGH: ICD-10-CM

## 2022-08-31 DIAGNOSIS — M79.652 PAIN IN LEFT THIGH: ICD-10-CM

## 2022-08-31 PROCEDURE — 99214 OFFICE O/P EST MOD 30 MIN: CPT | Performed by: INTERNAL MEDICINE

## 2022-08-31 RX ORDER — HYDROCHLOROTHIAZIDE 25 MG/1
25 TABLET ORAL DAILY
Qty: 90 TABLET | Refills: 3 | Status: SHIPPED | OUTPATIENT
Start: 2022-08-31 | End: 2023-11-07

## 2022-08-31 RX ORDER — HYDROCHLOROTHIAZIDE 25 MG/1
25 TABLET ORAL DAILY
Qty: 90 TABLET | Refills: 3 | Status: SHIPPED | OUTPATIENT
Start: 2022-08-31 | End: 2022-08-31 | Stop reason: SDUPTHER

## 2022-08-31 ASSESSMENT — FIBROSIS 4 INDEX: FIB4 SCORE: 0.71

## 2022-08-31 ASSESSMENT — ENCOUNTER SYMPTOMS
GASTROINTESTINAL NEGATIVE: 1
MUSCULOSKELETAL NEGATIVE: 1
CONSTITUTIONAL NEGATIVE: 1
PSYCHIATRIC NEGATIVE: 1
RESPIRATORY NEGATIVE: 1
EYES NEGATIVE: 1
NEUROLOGICAL NEGATIVE: 1

## 2022-09-01 NOTE — PROGRESS NOTES
"Subjective     Odalys Ballard is a 51 y.o. female who presents with Follow-Up (Discuss MRI )  Patient complains of swelling in the left thigh for the last few months which is not getting better and is slightly uncomfortable.  Patient questions about the fracture she had of her ribs following forceful coughing fits while recovering from RSV earlier this year.  I explained that this would be normal to develop callus formation which apparently she has and that this should heal up just fine.  She is followed by pulmonary who also reviewed the imaging.  This suggest possible DEXA scan to see if she is got osteoporosis making her more susceptible to fractures.    Her only other complaint was a swelling in her left thigh.        HPI    Review of Systems   Constitutional: Negative.    HENT: Negative.     Eyes: Negative.    Respiratory: Negative.     Cardiovascular:  Positive for leg swelling.   Gastrointestinal: Negative.    Genitourinary: Negative.    Musculoskeletal: Negative.    Skin: Negative.    Neurological: Negative.    Endo/Heme/Allergies: Negative.    Psychiatric/Behavioral: Negative.              Objective     /76 (BP Location: Left arm, Patient Position: Sitting, BP Cuff Size: Adult)   Pulse (!) 102   Temp 36.7 °C (98 °F) (Temporal)   Resp 16   Ht 1.702 m (5' 7\")   Wt 69.5 kg (153 lb 3.2 oz)   LMP 10/08/2016   SpO2 99%   BMI 23.99 kg/m²      Physical Exam  Vitals reviewed.   Constitutional:       General: She is not in acute distress.     Appearance: She is well-developed. She is not diaphoretic.   HENT:      Head: Normocephalic and atraumatic.      Right Ear: External ear normal.      Left Ear: External ear normal.      Nose: Nose normal.      Mouth/Throat:      Pharynx: No oropharyngeal exudate.   Eyes:      General: No scleral icterus.        Right eye: No discharge.         Left eye: No discharge.      Conjunctiva/sclera: Conjunctivae normal.      Pupils: Pupils are equal, round, and reactive to " light.   Neck:      Thyroid: No thyromegaly.      Vascular: No JVD.      Trachea: No tracheal deviation.   Cardiovascular:      Rate and Rhythm: Normal rate and regular rhythm.      Heart sounds: Normal heart sounds. No murmur heard.    No friction rub. No gallop.   Pulmonary:      Effort: Pulmonary effort is normal. No respiratory distress.      Breath sounds: Normal breath sounds. No stridor. No wheezing or rales.   Chest:      Chest wall: No tenderness.   Abdominal:      General: Bowel sounds are normal. There is no distension.      Palpations: Abdomen is soft. There is no mass.      Tenderness: There is no abdominal tenderness. There is no guarding or rebound.   Musculoskeletal:         General: No tenderness. Normal range of motion.      Cervical back: Normal range of motion and neck supple.   Lymphadenopathy:      Cervical: No cervical adenopathy.   Skin:     General: Skin is warm and dry.      Coloration: Skin is not pale.      Findings: No erythema or rash.   Neurological:      Mental Status: She is alert and oriented to person, place, and time.      Cranial Nerves: No cranial nerve deficit.      Motor: No abnormal muscle tone.      Coordination: Coordination normal.      Deep Tendon Reflexes: Reflexes are normal and symmetric. Reflexes normal.   Psychiatric:         Behavior: Behavior normal.         Thought Content: Thought content normal.         Judgment: Judgment normal.                           Assessment & Plan        1. Localized osteoporosis, unspecified pathological fracture presence  Check for osteoporosis and treat accordingly.  Good control continue current regimen  - DS-BONE DENSITY STUDY (DEXA); Future    2. Essential hypertension      Good control continue current regimen  - hydroCHLOROthiazide (HYDRODIURIL) 25 MG Tab; Take 1 Tablet by mouth every day.  Dispense: 90 Tablet; Refill: 3    3. Swelling of thigh  New problem.  There is a slight increase in circumference and size of the left  thigh.  Rule out mass.  Check MRI  - MR-FEMUR-W/O LEFT; Future    4. Pain in left thigh     As above recheck in 1 to 2 weeks to discuss results  - MR-FEMUR-W/O LEFT; Future

## 2022-09-19 ENCOUNTER — HOSPITAL ENCOUNTER (OUTPATIENT)
Dept: RADIOLOGY | Facility: MEDICAL CENTER | Age: 52
End: 2022-09-19
Attending: INTERNAL MEDICINE
Payer: COMMERCIAL

## 2022-09-19 DIAGNOSIS — M81.6 LOCALIZED OSTEOPOROSIS, UNSPECIFIED PATHOLOGICAL FRACTURE PRESENCE: ICD-10-CM

## 2022-09-19 PROCEDURE — 77080 DXA BONE DENSITY AXIAL: CPT

## 2022-09-21 DIAGNOSIS — R22.42 MASS OF THIGH, LEFT: ICD-10-CM

## 2022-12-05 ENCOUNTER — OFFICE VISIT (OUTPATIENT)
Dept: MEDICAL GROUP | Age: 52
End: 2022-12-05
Payer: COMMERCIAL

## 2022-12-05 VITALS
SYSTOLIC BLOOD PRESSURE: 142 MMHG | HEART RATE: 103 BPM | TEMPERATURE: 98 F | DIASTOLIC BLOOD PRESSURE: 86 MMHG | OXYGEN SATURATION: 95 % | HEIGHT: 67 IN | RESPIRATION RATE: 16 BRPM | WEIGHT: 160 LBS | BODY MASS INDEX: 25.11 KG/M2

## 2022-12-05 DIAGNOSIS — M25.562 CHRONIC PAIN OF LEFT KNEE: ICD-10-CM

## 2022-12-05 DIAGNOSIS — E78.5 DYSLIPIDEMIA: ICD-10-CM

## 2022-12-05 DIAGNOSIS — E55.9 VITAMIN D DEFICIENCY: ICD-10-CM

## 2022-12-05 DIAGNOSIS — G89.29 CHRONIC PAIN OF LEFT KNEE: ICD-10-CM

## 2022-12-05 DIAGNOSIS — M54.42 CHRONIC LEFT-SIDED LOW BACK PAIN WITH LEFT-SIDED SCIATICA: ICD-10-CM

## 2022-12-05 DIAGNOSIS — I10 ESSENTIAL HYPERTENSION: ICD-10-CM

## 2022-12-05 DIAGNOSIS — M25.552 CHRONIC HIP PAIN, LEFT: ICD-10-CM

## 2022-12-05 DIAGNOSIS — G89.29 CHRONIC HIP PAIN, LEFT: ICD-10-CM

## 2022-12-05 DIAGNOSIS — G89.29 CHRONIC LEFT-SIDED LOW BACK PAIN WITH LEFT-SIDED SCIATICA: ICD-10-CM

## 2022-12-05 DIAGNOSIS — R73.01 IFG (IMPAIRED FASTING GLUCOSE): ICD-10-CM

## 2022-12-05 DIAGNOSIS — I73.9 INTERMITTENT CLAUDICATION (HCC): ICD-10-CM

## 2022-12-05 DIAGNOSIS — J45.30 MILD PERSISTENT ASTHMA WITHOUT COMPLICATION: ICD-10-CM

## 2022-12-05 PROCEDURE — 99214 OFFICE O/P EST MOD 30 MIN: CPT | Performed by: INTERNAL MEDICINE

## 2022-12-05 RX ORDER — GABAPENTIN 100 MG/1
100 CAPSULE ORAL 3 TIMES DAILY
Qty: 90 CAPSULE | Refills: 3 | Status: SHIPPED | OUTPATIENT
Start: 2022-12-05 | End: 2022-12-06 | Stop reason: SDUPTHER

## 2022-12-05 ASSESSMENT — FIBROSIS 4 INDEX: FIB4 SCORE: 0.72

## 2022-12-05 ASSESSMENT — ENCOUNTER SYMPTOMS
CONSTITUTIONAL NEGATIVE: 1
CARDIOVASCULAR NEGATIVE: 1
PSYCHIATRIC NEGATIVE: 1
EYES NEGATIVE: 1
NEUROLOGICAL NEGATIVE: 1
GASTROINTESTINAL NEGATIVE: 1
MUSCULOSKELETAL NEGATIVE: 1
RESPIRATORY NEGATIVE: 1

## 2022-12-06 ENCOUNTER — TELEPHONE (OUTPATIENT)
Dept: MEDICAL GROUP | Age: 52
End: 2022-12-06
Payer: COMMERCIAL

## 2022-12-06 DIAGNOSIS — J01.10 ACUTE NON-RECURRENT FRONTAL SINUSITIS: ICD-10-CM

## 2022-12-06 DIAGNOSIS — M79.652 PAIN IN LEFT THIGH: ICD-10-CM

## 2022-12-06 DIAGNOSIS — M54.42 CHRONIC LEFT-SIDED LOW BACK PAIN WITH LEFT-SIDED SCIATICA: ICD-10-CM

## 2022-12-06 DIAGNOSIS — M25.552 CHRONIC HIP PAIN, LEFT: ICD-10-CM

## 2022-12-06 DIAGNOSIS — G89.29 CHRONIC HIP PAIN, LEFT: ICD-10-CM

## 2022-12-06 DIAGNOSIS — G89.29 CHRONIC LEFT-SIDED LOW BACK PAIN WITH LEFT-SIDED SCIATICA: ICD-10-CM

## 2022-12-06 RX ORDER — AMOXICILLIN 500 MG/1
500 CAPSULE ORAL 3 TIMES DAILY
Qty: 42 CAPSULE | Refills: 1 | Status: SHIPPED | OUTPATIENT
Start: 2022-12-06 | End: 2022-12-20

## 2022-12-06 RX ORDER — GABAPENTIN 100 MG/1
CAPSULE ORAL
Qty: 90 CAPSULE | Refills: 4 | Status: SHIPPED | OUTPATIENT
Start: 2022-12-06 | End: 2023-01-11

## 2022-12-06 NOTE — PROGRESS NOTES
Subjective     Odalys Ballard is a 52 y.o. female who presents with Follow-Up (Left leg swelling  leg is pulsating )  Patient is here for chronic ongoing problem for the last 6 to 8 months that is progressively getting worse involving left proximal thigh pain, buttocks pain and hip pain.  Is aggravated by certain positions and laying on it in certain positions as well.  She is undergone several weeks of physical therapy unsuccessfully with manipulation and massage and other modalities including heat ice rest and stretching and strengthening..  She rates her pain a 10 out of 10 from time to time and is there always at a slightly unquantifiable level.  It often wakes her from sleep.  She has had no numbness or tingling in her leg.  But she does feel a popping sensation in her posterior thigh whenever she tries to do leg curls.  She has been very athletic all her life and finds this very frustrating to get back to her usual fit self.  She began some relief with baclofen.  She has not tried Neurontin.    There is been no known history of direct trauma other than extensive exercise and horseback riding.  She does not recall 1 event that precipitated the symptoms.  But she does attribute a lot of this to her difficulties dealing with an RSV infection she sustained several months ago late last winter.    She was unable to get any MRI scanning of her thigh as conservative approach was recommended by insurance company, but she did seek consultation with neurosurgery and they have ordered an MRI of her lumbar spine as well as plain films and x-rays of her hip and pelvis.    In addition she is been recently complaining for last 1 month of pain in her left knee with a popping clicking and giving way from certain positions.  There is been some mild swelling in the knee but not disabling so.  No warmth or redness of the knee.  No fever chills.    Her asthma is been under good control on her current regimen with albuterol as  "needed.  Her blood pressure under good control with hydrochlorothiazide 25 mg daily but recently has noticed it to go up with weight gain slightly.  Usually her blood pressure is usually systolic of 110-120..          HPI    Review of Systems   Constitutional: Negative.    HENT: Negative.     Eyes: Negative.    Respiratory: Negative.     Cardiovascular: Negative.    Gastrointestinal: Negative.    Genitourinary: Negative.    Musculoskeletal: Negative.    Skin: Negative.    Neurological: Negative.    Endo/Heme/Allergies: Negative.    Psychiatric/Behavioral: Negative.          Allergies   Allergen Reactions    Lisinopril Cough    Losartan Cough     Per pt reports that she had bad congestion        Objective     BP (!) 142/86 (BP Location: Right arm, Patient Position: Sitting, BP Cuff Size: Adult)   Pulse (!) 103   Temp 36.7 °C (98 °F) (Temporal)   Resp 16   Ht 1.702 m (5' 7\")   Wt 72.6 kg (160 lb)   LMP 10/08/2016   SpO2 95%   BMI 25.06 kg/m²      Physical Exam  Vitals reviewed.   Constitutional:       General: She is not in acute distress.     Appearance: She is well-developed. She is not diaphoretic.   HENT:      Head: Normocephalic and atraumatic.      Right Ear: External ear normal.      Left Ear: External ear normal.      Nose: Nose normal.      Mouth/Throat:      Pharynx: No oropharyngeal exudate.   Eyes:      General: No scleral icterus.        Right eye: No discharge.         Left eye: No discharge.      Conjunctiva/sclera: Conjunctivae normal.      Pupils: Pupils are equal, round, and reactive to light.   Neck:      Thyroid: No thyromegaly.      Vascular: No JVD.      Trachea: No tracheal deviation.   Cardiovascular:      Rate and Rhythm: Normal rate and regular rhythm.      Heart sounds: Normal heart sounds. No murmur heard.    No friction rub. No gallop.   Pulmonary:      Effort: Pulmonary effort is normal. No respiratory distress.      Breath sounds: Normal breath sounds. No stridor. No wheezing or " rales.   Chest:      Chest wall: No tenderness.   Abdominal:      General: Bowel sounds are normal. There is no distension.      Palpations: Abdomen is soft. There is no mass.      Tenderness: There is no abdominal tenderness. There is no guarding or rebound.   Musculoskeletal:         General: No tenderness. Normal range of motion.      Cervical back: Normal range of motion and neck supple.   Lymphadenopathy:      Cervical: No cervical adenopathy.   Skin:     General: Skin is warm and dry.      Coloration: Skin is not pale.      Findings: No erythema or rash.   Neurological:      Mental Status: She is alert and oriented to person, place, and time.      Cranial Nerves: No cranial nerve deficit.      Motor: No abnormal muscle tone.      Coordination: Coordination normal.      Deep Tendon Reflexes: Reflexes are normal and symmetric. Reflexes normal.   Psychiatric:         Behavior: Behavior normal.         Thought Content: Thought content normal.         Judgment: Judgment normal.     No visits with results within 1 Month(s) from this visit.   Latest known visit with results is:   Hospital Outpatient Visit on 07/25/2022   Component Date Value    Alkaline Phosphatase 07/25/2022 69     AST(SGOT) 07/25/2022 20     ALT(SGPT) 07/25/2022 17     Total Bilirubin 07/25/2022 0.4     Direct Bilirubin 07/25/2022 <0.2     Indirect Bilirubin 07/25/2022 see below     Albumin 07/25/2022 5.0 (H)     Total Protein 07/25/2022 7.4       Lab Results   Component Value Date/Time    HBA1C 5.6 01/24/2022 09:37 AM    HBA1C 5.3 03/15/2021 08:08 AM     Lab Results   Component Value Date/Time    SODIUM 140 04/09/2022 11:22 AM    POTASSIUM 4.2 04/09/2022 11:22 AM    CHLORIDE 103 04/09/2022 11:22 AM    CO2 26 04/09/2022 11:22 AM    GLUCOSE 109 (H) 04/09/2022 11:22 AM    BUN 25 (H) 04/09/2022 11:22 AM    CREATININE 0.85 04/09/2022 11:22 AM    BUNCREATRAT 26 (H) 09/14/2020 04:46 AM    ALKPHOSPHAT 69 07/25/2022 09:05 AM    ASTSGOT 20 07/25/2022 09:05  AM    ALTSGPT 17 07/25/2022 09:05 AM    TBILIRUBIN 0.4 07/25/2022 09:05 AM     No results found for: INR  Lab Results   Component Value Date/Time    CHOLSTRLTOT 207 (H) 01/24/2022 09:37 AM     (H) 01/24/2022 09:37 AM    HDL 83 01/24/2022 09:37 AM    TRIGLYCERIDE 47 01/24/2022 09:37 AM       No results found for: TESTOSTERONE  Lab Results   Component Value Date/Time    TSH 1.490 06/19/2020 06:34 AM     No results found for: FREET4  No results found for: URICACID  No components found for: VITB12  Lab Results   Component Value Date/Time    25HYDROXY 38 01/24/2022 09:37 AM    25HYDROXY 50 03/15/2021 08:08 AM                           Assessment & Plan         1. Chronic left-sided low back pain with left-sided sciatica  Is a complicated problem but the more I listen to her symptoms it seems as though it is primarily musculoskeletal rather than  neurological.  However sciatica and nerve movement impingement or herniation or neuritis needs to be considered.  Follow-up with neurosurgery is suggested to be continued as well as with their pain management physician there.  She goes to [facility] neurosurgery.  No surgery has been felt to be indicated at this point.    My #1 suspicion is that she may have pulled her biceps femoris tendon or other posterior thigh musculature that is contributing to this ongoing problem, but further evaluation will be necessary with MRI scanning of the hip and thigh following plain films.  This will be arranged.    See below work-up.  Follow-up in 2 weeks to review results.  Trial of gabapentin.  Continue with stretching heat ice rest and gentle stretching and        - DX-LUMBAR SPINE-2 OR 3 VIEWS; Future  - DX-HIP-BILATERAL-WITH PELVIS-2 VIEWS; Future  - MR-LUMBAR SPINE-W/O; Future  - DX-HIP-COMPLETE - UNILATERAL 2+ LEFT; Future    2. Chronic hip pain, left     -As above.  Rule out hip impingement syndrome.  Rule out bony malignancy.  - DX-HIP-COMPLETE - UNILATERAL 2+ LEFT; Future  -  Referral to Orthopedics    3. Chronic pain of left knee  New problem.  For the last 1 month.  Possible meniscus tear.  Refer to orthopedics for further management which may involve getting MRI to further assess if no improvement with conservative management over the next 1 to 2 months.  - Referral to Orthopedics    4. Intermittent claudication (HCC)  She has good pulses distally I do not think that she has significant arterial vascular disease but this needs to be confirmed by noninvasive studies which have been ordered.  - US-EXTREMITY ARTERY LOWER BILAT W/RODRIGUEZ (COMBO); Future    5. Mild persistent asthma without complication     Under good control on current regimen.  Continue albuterol inhaler as needed.  Resume inhaled steroid if necessary.  6. Essential hypertension  This is been under good control until today when it was slightly elevated.  Continue hydrochlorothiazide 25 mg daily and monitor home BP readings daily and bring the log back in 2 weeks and if still elevated add amlodipine to her regimen since she is allergic to lisinopril and losartan.    7. IFG (impaired fasting glucose)  Mediterranean diet and exercise gently.  Stressed importance of high-protein low-carb.    8. Vitamin D deficiency  Good control continue 1000 units vitamin D3 daily.    9. Dyslipidemia  This is only minimal and mild and not at risk for cardiovascular disease since her [place] score is less than 2%.  Continue with monitoring diet and exercise.  No statins indicated.

## 2022-12-07 ENCOUNTER — APPOINTMENT (OUTPATIENT)
Dept: RADIOLOGY | Facility: MEDICAL CENTER | Age: 52
End: 2022-12-07
Attending: INTERNAL MEDICINE
Payer: COMMERCIAL

## 2022-12-07 DIAGNOSIS — M54.42 CHRONIC LEFT-SIDED LOW BACK PAIN WITH LEFT-SIDED SCIATICA: ICD-10-CM

## 2022-12-07 DIAGNOSIS — M25.552 CHRONIC HIP PAIN, LEFT: ICD-10-CM

## 2022-12-07 DIAGNOSIS — G89.29 CHRONIC LEFT-SIDED LOW BACK PAIN WITH LEFT-SIDED SCIATICA: ICD-10-CM

## 2022-12-07 DIAGNOSIS — G89.29 CHRONIC HIP PAIN, LEFT: ICD-10-CM

## 2022-12-07 PROCEDURE — 73522 X-RAY EXAM HIPS BI 3-4 VIEWS: CPT

## 2022-12-09 ENCOUNTER — TELEMEDICINE (OUTPATIENT)
Dept: URGENT CARE | Facility: CLINIC | Age: 52
End: 2022-12-09
Payer: COMMERCIAL

## 2022-12-09 DIAGNOSIS — Z20.828 EXPOSURE TO INFLUENZA: ICD-10-CM

## 2022-12-09 PROCEDURE — 99213 OFFICE O/P EST LOW 20 MIN: CPT | Mod: CS,95 | Performed by: NURSE PRACTITIONER

## 2022-12-09 RX ORDER — OSELTAMIVIR PHOSPHATE 75 MG/1
75 CAPSULE ORAL 2 TIMES DAILY
Qty: 10 CAPSULE | Refills: 0 | Status: SHIPPED | OUTPATIENT
Start: 2022-12-09 | End: 2022-12-28

## 2022-12-09 NOTE — PROGRESS NOTES
Virtual Visit: Established Patient   This visit was conducted via Zoom using secure and encrypted videoconferencing technology.   The patient was in their home in the state of [place].    The patient's identity was confirmed and verbal consent was obtained for this virtual visit.    Subjective:   CC: Flu exposure.    Odalys Ballard is a 52 y.o. female presenting for evaluation and management of:    Has had a sore throat and tiredness, had a recent sinus infection. New onset cough today. No fever. No body aches.  Daughter was dx today with influenza A, and her  has symptoms. Has concerns with a hx of being hospitalized last year with RSV.    ROS   Hx of asthma.  Albuterol.     Current medicines (including changes today)  Current Outpatient Medications   Medication Sig Dispense Refill    oseltamivir (TAMIFLU) 75 MG Cap Take 1 Capsule by mouth 2 times a day. 10 Capsule 0    azelastine (ASTELIN) 137 MCG/SPRAY nasal spray       budesonide (PULMICORT) 0.5 MG/2ML Suspension USE 1 VIAL IN NEBULIZER TWICE DAILY AS DIRECTED      pantoprazole (PROTONIX) 40 MG Tablet Delayed Response       PREVIDENT 5000 DRY MOUTH 1.1 % Gel       Amoxicillin 500 MG Tab       amoxicillin (AMOXIL) 500 MG Cap Take 1 Capsule by mouth 3 times a day for 14 days. 42 Capsule 1    gabapentin (NEURONTIN) 100 MG Cap 100 mg at bedtime for 2 weeks, then 200 mg at bedtime for 2 weeks, then 300 mg at bedtime thereafter. 90 Capsule 4    Eszopiclone 3 MG Tab Take 1 tablet by mouth in the evening 90 Tablet 1    hydroCHLOROthiazide (HYDRODIURIL) 25 MG Tab Take 1 Tablet by mouth every day. 90 Tablet 3    baclofen (LIORESAL) 10 MG Tab TAKE 1 TABLET BY MOUTH ONCE DAILY AS NEEDED 90 Tablet 3    fluticasone (FLONASE) 50 MCG/ACT nasal spray fluticasone propionate 50 mcg/actuation nasal spray,suspension   SHAKE LIQUID AND USE 2 SPRAYS IN EACH NOSTRIL EVERY DAY      rizatriptan (MAXALT) 10 MG tablet TAKE 1 TABLET BY MOUTH ONCE DAILY AS NEEDED FOR MIGRAINE HEADACHE  (Patient taking differently: Take 10 mg by mouth one time as needed for Migraine.) 12 Tablet 5    albuterol 108 (90 Base) MCG/ACT Aero Soln inhalation aerosol Inhale 2 Puffs by mouth every 6 hours as needed for Shortness of Breath. 8.5 g 11     No current facility-administered medications for this visit.       Patient Active Problem List    Diagnosis Date Noted    Mass of thigh, left 09/21/2022    Fracture of multiple ribs of left side with routine healing 08/20/2022    Cervical lymphadenitis 06/01/2022    Costochondral chest pain 03/27/2022    Shortness of breath 03/27/2022    RSV (respiratory syncytial virus infection) 03/27/2022    Abnormal EKG 03/27/2022    Hypertensive urgency 03/27/2022    Dissection of vertebral artery (HCC)- left on CTA 6/2021- Dr. Saldana 03/03/2022    S/P bilateral mastectomy 05/04/2021    Cervical radiculopathy at C6 10/01/2020    Essential hypertension 10/01/2020    IFG (impaired fasting glucose) 10/01/2020    Vitamin D deficiency 10/01/2020    Dyslipidemia 10/01/2020    Primary insomnia 10/01/2020    Mild persistent asthma without complication 06/15/2020    Family history of breast cancer gene mutation in first degree relative 06/15/2020    White coat syndrome with diagnosis of hypertension 06/15/2020    Migraine without aura and with status migrainosus, not intractable 06/15/2020        Objective:   LMP 10/08/2016   RR 18    Physical Exam:  Constitutional: Alert, no distress, well-groomed.  Skin: No rashes in visible areas.  Eye: Round. Conjunctiva clear, lids normal.  ENMT: Lips pink. Phonation normal.  Respiratory: Unlabored respiratory effort, no cough or audible wheeze  Psych: Alert and oriented x3, normal affect and mood.     Assessment and Plan:   The following treatment plan was discussed:     1. Exposure to influenza  - oseltamivir (TAMIFLU) 75 MG Cap; Take 1 Capsule by mouth 2 times a day.  Dispense: 10 Capsule; Refill: 0    Symptomatic care.  -Oral hydration and rest.   -Over  the counter cough suppressant as directed.  -Diphenhydramine as directed for rhinorrhea (runny nose) and sneezing.  -May take over the counter pseudoephedrine for nasal congestion. Can increase your blood pressure.  -Tylenol for pain and fever as directed.   -Saline nasal spray as a decongestant.  -Infection control measures at home. Hand washing, covering sneeze/cough.  -Remain home from work, school, and other populated environments until at least 24 hours after you no longer have a fever.     Follow up with primary care provider. Follow up urgently for worsening symptoms, ear pain or drainage, shortness of breath, abdominal pain, or any other concerns. Follow up emergently for trouble breathing, elevated heart rate, chest pain, signs of dehydration, dizziness, weakness, decreased urine output, confusion, persistent vomiting, severe headache, neck stiffness, persistent high grade fever.    -Discussed viral etiology of Influenza; and associated complications, including risk of pneumonia and ear infections. S&S of influenza. Acute cough today. Confirmed exposure to influenza A.     Follow-up: Return if symptoms worsen or fail to improve.

## 2022-12-14 RX ORDER — PANTOPRAZOLE SODIUM 40 MG/1
TABLET, DELAYED RELEASE ORAL
COMMUNITY
Start: 2022-12-07 | End: 2022-12-28

## 2022-12-14 RX ORDER — AMOXICILLIN 500 MG/1
TABLET, FILM COATED ORAL
COMMUNITY
Start: 2022-12-07 | End: 2022-12-28

## 2022-12-14 RX ORDER — AZELASTINE 1 MG/ML
SPRAY, METERED NASAL
COMMUNITY
Start: 2022-12-07 | End: 2023-02-09

## 2022-12-14 RX ORDER — SODIUM FLUORIDE 6.1 MG/ML
GEL, DENTIFRICE DENTAL
COMMUNITY
Start: 2022-12-07 | End: 2023-10-15

## 2022-12-14 RX ORDER — BUDESONIDE 0.5 MG/2ML
INHALANT ORAL
COMMUNITY
Start: 2022-12-05 | End: 2023-10-15

## 2022-12-15 NOTE — PATIENT INSTRUCTIONS
Symptomatic care.  -Oral hydration and rest.   -Over the counter cough suppressant as directed.  -Diphenhydramine as directed for rhinorrhea (runny nose) and sneezing.  -May take over the counter pseudoephedrine for nasal congestion. Can increase your blood pressure.  -Tylenol for pain and fever as directed.   -Saline nasal spray as a decongestant.  -Infection control measures at home. Hand washing, covering sneeze/cough.  -Remain home from work, school, and other populated environments until at least 24 hours after you no longer have a fever.     Follow up with primary care provider. Follow up urgently for worsening symptoms, ear pain or drainage, shortness of breath, abdominal pain, or any other concerns. Follow up emergently for trouble breathing, elevated heart rate, chest pain, signs of dehydration, dizziness, weakness, decreased urine output, confusion, persistent vomiting, severe headache, neck stiffness, persistent high grade fever.

## 2022-12-21 ENCOUNTER — OFFICE VISIT (OUTPATIENT)
Dept: URGENT CARE | Facility: CLINIC | Age: 52
End: 2022-12-21
Payer: COMMERCIAL

## 2022-12-21 VITALS
TEMPERATURE: 98.7 F | SYSTOLIC BLOOD PRESSURE: 118 MMHG | OXYGEN SATURATION: 98 % | RESPIRATION RATE: 16 BRPM | HEIGHT: 67 IN | WEIGHT: 161.4 LBS | BODY MASS INDEX: 25.33 KG/M2 | DIASTOLIC BLOOD PRESSURE: 78 MMHG | HEART RATE: 86 BPM

## 2022-12-21 DIAGNOSIS — J02.9 SORE THROAT: ICD-10-CM

## 2022-12-21 DIAGNOSIS — J02.0 ACUTE STREPTOCOCCAL PHARYNGITIS: ICD-10-CM

## 2022-12-21 DIAGNOSIS — Z20.828 EXPOSURE TO THE FLU: ICD-10-CM

## 2022-12-21 LAB
FLUAV+FLUBV AG SPEC QL IA: NEGATIVE
INT CON NEG: NEGATIVE
INT CON NEG: NEGATIVE
INT CON POS: POSITIVE
INT CON POS: POSITIVE
S PYO AG THROAT QL: POSITIVE

## 2022-12-21 PROCEDURE — 87804 INFLUENZA ASSAY W/OPTIC: CPT | Performed by: NURSE PRACTITIONER

## 2022-12-21 PROCEDURE — 87880 STREP A ASSAY W/OPTIC: CPT | Performed by: NURSE PRACTITIONER

## 2022-12-21 PROCEDURE — 99213 OFFICE O/P EST LOW 20 MIN: CPT | Mod: CS | Performed by: NURSE PRACTITIONER

## 2022-12-21 RX ORDER — CEPHALEXIN 500 MG/1
500 CAPSULE ORAL 2 TIMES DAILY
Qty: 20 CAPSULE | Refills: 0 | Status: SHIPPED | OUTPATIENT
Start: 2022-12-21 | End: 2022-12-31

## 2022-12-21 ASSESSMENT — ENCOUNTER SYMPTOMS
CHILLS: 1
SORE THROAT: 1
FEVER: 0
EYE REDNESS: 0
HEADACHES: 0
WEAKNESS: 0
DIARRHEA: 1
DIZZINESS: 0
SHORTNESS OF BREATH: 0
COUGH: 0
WHEEZING: 0
NECK PAIN: 0
ABDOMINAL PAIN: 0
CONSTIPATION: 0
VOMITING: 0
MYALGIAS: 0
NAUSEA: 0
SINUS PAIN: 0
EYE DISCHARGE: 0

## 2022-12-21 ASSESSMENT — FIBROSIS 4 INDEX: FIB4 SCORE: 0.72

## 2022-12-22 NOTE — PROGRESS NOTES
Subjective     Odalys Ballard is a 52 y.o. female who presents with Sore Throat (Roof of mouth feels like sandpaper R side hurts to swallow, diarrhea, chills, loss of appetite x yesterday; exposure to rsv and influenza)            HPI   has been experiencing sore throat since yesterday.   just finished antibiotics for sinus infection, amoxi x 10 day yesterday morning.  Painful to swallow on right side of throat.  Mild diarrhea.  Denies vomiting but does have mild nausea.   has had recent exposure to RSV and influenza from her daughter last week.  Denies cough, shortness of breath or fevers.  History of asthma and seasonal allergies.  Does take albuterol inhaler/nebulizer, allergy med, Flonase.  No salt water gargle.    PMH:  has a past medical history of Anesthesia, ASTHMA, At high risk for breast cancer- prophylactic bilateral mastectomy TBD November 2020 (10/1/2020), Bronchitis (2015), Excessive sodium intake (10/1/2020), Heart burn, Hiatus hernia syndrome, Hypertension, Other specified symptom associated with female genital organs, and Pneumonia (2010).    She has no past medical history of Allergy.  MEDS:   Current Outpatient Medications:     cephALEXin (KEFLEX) 500 MG Cap, Take 1 Capsule by mouth 2 times a day for 10 days., Disp: 20 Capsule, Rfl: 0    azelastine (ASTELIN) 137 MCG/SPRAY nasal spray, , Disp: , Rfl:     budesonide (PULMICORT) 0.5 MG/2ML Suspension, USE 1 VIAL IN NEBULIZER TWICE DAILY AS DIRECTED, Disp: , Rfl:     PREVIDENT 5000 DRY MOUTH 1.1 % Gel, , Disp: , Rfl:     gabapentin (NEURONTIN) 100 MG Cap, 100 mg at bedtime for 2 weeks, then 200 mg at bedtime for 2 weeks, then 300 mg at bedtime thereafter., Disp: 90 Capsule, Rfl: 4    Eszopiclone 3 MG Tab, Take 1 tablet by mouth in the evening, Disp: 90 Tablet, Rfl: 1    hydroCHLOROthiazide (HYDRODIURIL) 25 MG Tab, Take 1 Tablet by mouth every day., Disp: 90 Tablet, Rfl: 3    baclofen (LIORESAL) 10 MG Tab, TAKE 1 TABLET BY MOUTH ONCE  DAILY AS NEEDED, Disp: 90 Tablet, Rfl: 3    fluticasone (FLONASE) 50 MCG/ACT nasal spray, fluticasone propionate 50 mcg/actuation nasal spray,suspension  SHAKE LIQUID AND USE 2 SPRAYS IN EACH NOSTRIL EVERY DAY, Disp: , Rfl:     rizatriptan (MAXALT) 10 MG tablet, TAKE 1 TABLET BY MOUTH ONCE DAILY AS NEEDED FOR MIGRAINE HEADACHE (Patient taking differently: Take 10 mg by mouth one time as needed for Migraine.), Disp: 12 Tablet, Rfl: 5    albuterol 108 (90 Base) MCG/ACT Aero Soln inhalation aerosol, Inhale 2 Puffs by mouth every 6 hours as needed for Shortness of Breath., Disp: 8.5 g, Rfl: 11    pantoprazole (PROTONIX) 40 MG Tablet Delayed Response, , Disp: , Rfl:     Amoxicillin 500 MG Tab, , Disp: , Rfl:     oseltamivir (TAMIFLU) 75 MG Cap, Take 1 Capsule by mouth 2 times a day., Disp: 10 Capsule, Rfl: 0  ALLERGIES:   Allergies   Allergen Reactions    Lisinopril Cough    Losartan Cough     Per pt reports that she had bad congestion      SURGHX:   Past Surgical History:   Procedure Laterality Date    BREAST RECONSTRUCTION Bilateral 2/11/2021    Procedure: RECONSTRUCTION, BREAST - AND REVISION WITH DERMAL GLANDULAR FLAPS, CAPSULECTOMY;  Surgeon: Amos Dang M.D.;  Location: SURGERY SAME DAY [facility];  Service: Plastics    EXCISION,FAT GRAFT  2/11/2021    Procedure: EXCISION, FAT GRAFT- FOR FAT GRAFTING;  Surgeon: Amos Dang M.D.;  Location: SURGERY SAME DAY [facility];  Service: Plastics    TISSUE EXPANDER PLACE/REMOVE Bilateral 2/11/2021    Procedure: INSERTION OR REMOVAL, TISSUE EXPANDER;  Surgeon: Amos Dang M.D.;  Location: SURGERY SAME DAY [facility];  Service: Plastics    BREAST IMPLANT REVISION Bilateral 2/11/2021    Procedure: INSERTION, IMPLANT, BREAST;  Surgeon: Amos Dang M.D.;  Location: SURGERY SAME DAY [facility];  Service: Plastics    LIPOSUCTION  2/11/2021    Procedure: LIPOSUCTION-ABDOMEN, FLANKS, HIPS OUTTER THIGHS, AND UPPER BACK;  Surgeon: Amos Dang M.D.;  Location:  SURGERY SAME DAY [facility];  Service: Plastics    PB MASTECTOMY, SIMPLE, COMPLETE Bilateral 11/5/2020    Procedure: MASTECTOMY;  Surgeon: Franny Felix M.D.;  Location: SURGERY [facility];  Service: General    BREAST RECONSTRUCTION Bilateral 11/5/2020    Procedure: RECONSTRUCTION, BREAST;  Surgeon: Amos Dang M.D.;  Location: SURGERY [facility];  Service: Plastics    TISSUE EXPANDER PLACE/REMOVE Bilateral 11/5/2020    Procedure: TISSUE EXPANDER- FOR PLACEMENT  AND USE OF ACELLULAR DERMAL MATRIX;  Surgeon: Amos Dang M.D.;  Location: SURGERY [facility];  Service: Plastics    HYSTERECTOMY ROBOTIC N/A 10/17/2016    Procedure: HYSTERECTOMY ROBOTIC, BILATERAL SALPINGECTOMY;  Surgeon: Yamilet Trammell M.D.;  Location: SURGERY [facility];  Service:     CYSTOSCOPY N/A 10/17/2016    Procedure: CYSTOSCOPY;  Surgeon: Yamilet Trammell M.D.;  Location: SURGERY [facility];  Service:     PELVISCOPY  9/6/2013    Performed by Spencer Matthews M.D. at [facility]    CHOLECYSTECTOMY  2009    laparoscopic    LAPAROSCOPY  2000, 2008     SOCHX:  reports that she has never smoked. She has never used smokeless tobacco. She reports that she does not currently use alcohol. She reports that she does not use drugs.  FH: Family history was reviewed, no pertinent findings to report    Review of Systems   Constitutional:  Positive for chills. Negative for fever and malaise/fatigue.   HENT:  Positive for congestion and sore throat. Negative for ear pain and sinus pain.    Eyes:  Negative for discharge and redness.   Respiratory:  Negative for cough, shortness of breath and wheezing.    Gastrointestinal:  Positive for diarrhea. Negative for abdominal pain, constipation, nausea and vomiting.   Musculoskeletal:  Negative for myalgias and neck pain.   Skin:  Negative for itching and rash.   Neurological:  Negative for dizziness, weakness and headaches.   Endo/Heme/Allergies:  Negative for environmental  "allergies.   All other systems reviewed and are negative.             Objective     /78   Pulse 86   Temp 37.1 °C (98.7 °F) (Temporal)   Resp 16   Ht 1.702 m (5' 7\")   Wt 73.2 kg (161 lb 6.4 oz)   LMP 10/08/2016   SpO2 98%   BMI 25.28 kg/m²      Physical Exam  Vitals reviewed.   Constitutional:       General: She is awake. She is not in acute distress.     Appearance: Normal appearance. She is well-developed. She is not ill-appearing, toxic-appearing or diaphoretic.   HENT:      Head: Normocephalic.      Right Ear: Ear canal and external ear normal. A middle ear effusion is present.      Left Ear: Ear canal and external ear normal. A middle ear effusion is present.      Nose: Mucosal edema, congestion and rhinorrhea present.      Mouth/Throat:      Lips: Pink.      Mouth: Mucous membranes are dry.      Pharynx: Uvula midline. Posterior oropharyngeal erythema present. No pharyngeal swelling, oropharyngeal exudate or uvula swelling.      Tonsils: No tonsillar exudate or tonsillar abscesses.   Eyes:      Conjunctiva/sclera: Conjunctivae normal.      Pupils: Pupils are equal, round, and reactive to light.   Cardiovascular:      Rate and Rhythm: Normal rate.   Pulmonary:      Effort: Pulmonary effort is normal. No tachypnea, accessory muscle usage or respiratory distress.      Breath sounds: Normal breath sounds and air entry. No stridor, decreased air movement or transmitted upper airway sounds. No decreased breath sounds, wheezing, rhonchi or rales.   Musculoskeletal:         General: Normal range of motion.      Cervical back: Normal range of motion and neck supple.   Skin:     General: Skin is warm and dry.   Neurological:      Mental Status: She is alert and oriented to person, place, and time.   Psychiatric:         Attention and Perception: Attention normal.         Mood and Affect: Mood normal.         Speech: Speech normal.         Behavior: Behavior normal. Behavior is cooperative.              "              Assessment & Plan        1. Acute streptococcal pharyngitis    - cephALEXin (KEFLEX) 500 MG Cap; Take 1 Capsule by mouth 2 times a day for 10 days.  Dispense: 20 Capsule; Refill: 0       -Maintain hydration/water intake  -May use over the counter Ibuprofen/Tylenol as needed for any fever, body aches or throat pain  -May take long acting antihistamine for seasonal allergy symptoms as needed  -May use over the counter saline nasal spray for nasal congestion as needed  -May use over the counter Nasacort/Flonase for nasal congestion as needed   -May use throat lozenges for throat discomfort as needed   -Change toothbrush after 24 hrs of initiating antibiotics   -May gargle with salt water up to 4x/day as needed for throat discomfort (1 tsp salt dissolved in 1 cup warm water)  -Recommend oral probiotic with this antibiotic  -May drink smoothies for nutrition if too painful to swallow solid foods  -Monitor for any sinus pain/pressure with sinus congestion with thick mucus production, sinus headache, cough, shortness of breath, fever- need re-evaluation

## 2022-12-28 ENCOUNTER — OFFICE VISIT (OUTPATIENT)
Dept: MEDICAL GROUP | Age: 52
End: 2022-12-28
Payer: COMMERCIAL

## 2022-12-28 VITALS
HEART RATE: 100 BPM | WEIGHT: 161 LBS | HEIGHT: 67 IN | TEMPERATURE: 96.9 F | DIASTOLIC BLOOD PRESSURE: 84 MMHG | SYSTOLIC BLOOD PRESSURE: 140 MMHG | BODY MASS INDEX: 25.27 KG/M2 | OXYGEN SATURATION: 97 % | RESPIRATION RATE: 16 BRPM

## 2022-12-28 DIAGNOSIS — G89.29 CHRONIC HIP PAIN, LEFT: ICD-10-CM

## 2022-12-28 DIAGNOSIS — G89.29 CHRONIC LEFT-SIDED LOW BACK PAIN WITH LEFT-SIDED SCIATICA: ICD-10-CM

## 2022-12-28 DIAGNOSIS — M62.830 MUSCLE SPASM OF BACK: ICD-10-CM

## 2022-12-28 DIAGNOSIS — M25.552 CHRONIC HIP PAIN, LEFT: ICD-10-CM

## 2022-12-28 DIAGNOSIS — R22.42 MASS OF LEFT THIGH: ICD-10-CM

## 2022-12-28 DIAGNOSIS — R19.09 GROIN MASS IN FEMALE: ICD-10-CM

## 2022-12-28 DIAGNOSIS — M54.42 CHRONIC LEFT-SIDED LOW BACK PAIN WITH LEFT-SIDED SCIATICA: ICD-10-CM

## 2022-12-28 PROCEDURE — 99214 OFFICE O/P EST MOD 30 MIN: CPT | Performed by: INTERNAL MEDICINE

## 2022-12-28 RX ORDER — MELOXICAM 7.5 MG/1
7.5 TABLET ORAL 2 TIMES DAILY PRN
Qty: 60 TABLET | Refills: 3 | Status: SHIPPED | OUTPATIENT
Start: 2022-12-28 | End: 2023-01-11

## 2022-12-28 RX ORDER — BACLOFEN 10 MG/1
10 TABLET ORAL 3 TIMES DAILY PRN
Qty: 90 TABLET | Refills: 3 | Status: SHIPPED | OUTPATIENT
Start: 2022-12-28 | End: 2023-05-08

## 2022-12-28 ASSESSMENT — ENCOUNTER SYMPTOMS
PSYCHIATRIC NEGATIVE: 1
MUSCULOSKELETAL NEGATIVE: 1
NEUROLOGICAL NEGATIVE: 1
GASTROINTESTINAL NEGATIVE: 1
CARDIOVASCULAR NEGATIVE: 1
EYES NEGATIVE: 1
RESPIRATORY NEGATIVE: 1
CONSTITUTIONAL NEGATIVE: 1

## 2022-12-28 ASSESSMENT — FIBROSIS 4 INDEX: FIB4 SCORE: 0.72

## 2022-12-29 NOTE — PROGRESS NOTES
"Subjective     Odalys Ballard is a 52 y.o. female who presents with Follow-Up (Hip leg pain )  Patient continues to complain of pain in the left lateral thigh and left groin area and no improvement with physical therapy heat rest stretching and other modalities.  X-rays were 9 contributory of the LS spine and hip.  She has been seen at the neurosurgery office and has an appointment with orthopedic surgeon at [facility] next week.          HPI    Review of Systems   Constitutional: Negative.    HENT: Negative.     Eyes: Negative.    Respiratory: Negative.     Cardiovascular: Negative.    Gastrointestinal: Negative.    Genitourinary: Negative.    Musculoskeletal: Negative.    Skin: Negative.    Neurological: Negative.    Endo/Heme/Allergies: Negative.    Psychiatric/Behavioral: Negative.              Objective     BP (!) 140/84 (BP Location: Left arm, Patient Position: Sitting, BP Cuff Size: Adult)   Pulse 100   Temp 36.1 °C (96.9 °F) (Temporal)   Resp 16   Ht 1.702 m (5' 7\")   Wt 73 kg (161 lb)   LMP 10/08/2016   SpO2 97%   BMI 25.22 kg/m²      Physical Exam  Vitals reviewed.   Constitutional:       General: She is not in acute distress.     Appearance: She is well-developed. She is not diaphoretic.   HENT:      Head: Normocephalic and atraumatic.      Right Ear: External ear normal.      Left Ear: External ear normal.      Nose: Nose normal.      Mouth/Throat:      Pharynx: No oropharyngeal exudate.   Eyes:      General: No scleral icterus.        Right eye: No discharge.         Left eye: No discharge.      Conjunctiva/sclera: Conjunctivae normal.      Pupils: Pupils are equal, round, and reactive to light.   Neck:      Thyroid: No thyromegaly.      Vascular: No JVD.      Trachea: No tracheal deviation.   Cardiovascular:      Rate and Rhythm: Normal rate and regular rhythm.      Heart sounds: Normal heart sounds. No murmur heard.    No friction rub. No gallop.   Pulmonary:      Effort: Pulmonary effort is " normal. No respiratory distress.      Breath sounds: Normal breath sounds. No stridor. No wheezing or rales.   Chest:      Chest wall: No tenderness.   Abdominal:      General: Bowel sounds are normal. There is no distension.      Palpations: Abdomen is soft. There is no mass.      Tenderness: There is no abdominal tenderness. There is no guarding or rebound.   Musculoskeletal:         General: No tenderness. Normal range of motion.      Cervical back: Normal range of motion and neck supple.      Comments: There is some fullness in the left lateral thigh any significant tenderness.   Lymphadenopathy:      Cervical: No cervical adenopathy.   Skin:     General: Skin is warm and dry.      Coloration: Skin is not pale.      Findings: No erythema or rash.   Neurological:      Mental Status: She is alert and oriented to person, place, and time.      Cranial Nerves: No cranial nerve deficit.      Motor: No abnormal muscle tone.      Coordination: Coordination normal.      Deep Tendon Reflexes: Reflexes are normal and symmetric. Reflexes normal.   Psychiatric:         Behavior: Behavior normal.         Thought Content: Thought content normal.         Judgment: Judgment normal.                            Assessment & Plan        1. Muscle spasm of back     - baclofen (LIORESAL) 10 MG Tab; Take 1 Tablet by mouth 3 times a day as needed (muscle spasms).  Dispense: 90 Tablet; Refill: 3    2. Chronic left-sided low back pain with left-sided sciatica  Good control of meloxicam.  Continue unchanged  - meloxicam (MOBIC) 7.5 MG Tab; Take 1 Tablet by mouth 2 times a day as needed for Moderate Pain.  Dispense: 60 Tablet; Refill: 3    3. Chronic hip pain, left  Continue with meloxicam.  Rule out sarcoma of the left thigh.  Rule out osteosarcoma  - meloxicam (MOBIC) 7.5 MG Tab; Take 1 Tablet by mouth 2 times a day as needed for Moderate Pain.  Dispense: 60 Tablet; Refill: 3    4. Mass of left thigh     As above  - MR-FEMUR-W/O LEFT;  Future  - DX-FEMUR-2+ LEFT; Future    5. Groin mass in female  As above  - MR-FEMUR-W/O LEFT; Future

## 2023-01-04 ENCOUNTER — HOSPITAL ENCOUNTER (OUTPATIENT)
Dept: RADIOLOGY | Facility: MEDICAL CENTER | Age: 53
End: 2023-01-04
Attending: INTERNAL MEDICINE
Payer: COMMERCIAL

## 2023-01-04 DIAGNOSIS — I73.9 INTERMITTENT CLAUDICATION (HCC): ICD-10-CM

## 2023-01-04 PROCEDURE — 93922 UPR/L XTREMITY ART 2 LEVELS: CPT

## 2023-01-11 DIAGNOSIS — M25.552 CHRONIC LEFT HIP PAIN: ICD-10-CM

## 2023-01-11 DIAGNOSIS — G89.29 CHRONIC LEFT HIP PAIN: ICD-10-CM

## 2023-01-26 DIAGNOSIS — G43.001 MIGRAINE WITHOUT AURA AND WITH STATUS MIGRAINOSUS, NOT INTRACTABLE: ICD-10-CM

## 2023-01-26 RX ORDER — RIZATRIPTAN BENZOATE 10 MG/1
TABLET ORAL
Qty: 12 TABLET | Refills: 0 | Status: SHIPPED | OUTPATIENT
Start: 2023-01-26 | End: 2023-03-21

## 2023-02-09 ENCOUNTER — OFFICE VISIT (OUTPATIENT)
Dept: MEDICAL GROUP | Age: 53
End: 2023-02-09
Payer: COMMERCIAL

## 2023-02-09 VITALS
HEIGHT: 67 IN | TEMPERATURE: 97.6 F | DIASTOLIC BLOOD PRESSURE: 82 MMHG | BODY MASS INDEX: 24.01 KG/M2 | SYSTOLIC BLOOD PRESSURE: 130 MMHG | WEIGHT: 153 LBS | RESPIRATION RATE: 16 BRPM | HEART RATE: 110 BPM | OXYGEN SATURATION: 98 %

## 2023-02-09 DIAGNOSIS — E55.9 VITAMIN D DEFICIENCY: ICD-10-CM

## 2023-02-09 DIAGNOSIS — G89.29 CHRONIC LOW BACK PAIN WITH SCIATICA, SCIATICA LATERALITY UNSPECIFIED, UNSPECIFIED BACK PAIN LATERALITY: ICD-10-CM

## 2023-02-09 DIAGNOSIS — F51.01 PRIMARY INSOMNIA: ICD-10-CM

## 2023-02-09 DIAGNOSIS — G89.29 CHRONIC LEFT HIP PAIN: ICD-10-CM

## 2023-02-09 DIAGNOSIS — R73.01 IFG (IMPAIRED FASTING GLUCOSE): ICD-10-CM

## 2023-02-09 DIAGNOSIS — Z11.59 NEED FOR HEPATITIS C SCREENING TEST: ICD-10-CM

## 2023-02-09 DIAGNOSIS — M54.40 CHRONIC LOW BACK PAIN WITH SCIATICA, SCIATICA LATERALITY UNSPECIFIED, UNSPECIFIED BACK PAIN LATERALITY: ICD-10-CM

## 2023-02-09 DIAGNOSIS — M25.552 CHRONIC LEFT HIP PAIN: ICD-10-CM

## 2023-02-09 DIAGNOSIS — Z23 NEED FOR VACCINATION: ICD-10-CM

## 2023-02-09 DIAGNOSIS — G57.02 PYRIFORMIS SYNDROME, LEFT: ICD-10-CM

## 2023-02-09 DIAGNOSIS — E78.5 DYSLIPIDEMIA: ICD-10-CM

## 2023-02-09 PROCEDURE — 90746 HEPB VACCINE 3 DOSE ADULT IM: CPT | Performed by: INTERNAL MEDICINE

## 2023-02-09 PROCEDURE — 90472 IMMUNIZATION ADMIN EACH ADD: CPT | Performed by: INTERNAL MEDICINE

## 2023-02-09 PROCEDURE — 90677 PCV20 VACCINE IM: CPT | Performed by: INTERNAL MEDICINE

## 2023-02-09 PROCEDURE — 90471 IMMUNIZATION ADMIN: CPT | Performed by: INTERNAL MEDICINE

## 2023-02-09 PROCEDURE — 99214 OFFICE O/P EST MOD 30 MIN: CPT | Mod: 25 | Performed by: INTERNAL MEDICINE

## 2023-02-09 RX ORDER — GABAPENTIN 100 MG/1
100 CAPSULE ORAL 3 TIMES DAILY
Qty: 90 CAPSULE | Refills: 3 | Status: SHIPPED | OUTPATIENT
Start: 2023-02-09 | End: 2023-12-14 | Stop reason: SDUPTHER

## 2023-02-09 RX ORDER — CELECOXIB 200 MG/1
200 CAPSULE ORAL 2 TIMES DAILY
Qty: 60 CAPSULE | Refills: 5 | Status: SHIPPED | OUTPATIENT
Start: 2023-02-09 | End: 2023-10-15

## 2023-02-09 ASSESSMENT — ENCOUNTER SYMPTOMS
NEUROLOGICAL NEGATIVE: 1
RESPIRATORY NEGATIVE: 1
EYES NEGATIVE: 1
GASTROINTESTINAL NEGATIVE: 1
CARDIOVASCULAR NEGATIVE: 1
CONSTITUTIONAL NEGATIVE: 1
PSYCHIATRIC NEGATIVE: 1
MYALGIAS: 1
BACK PAIN: 1

## 2023-02-09 ASSESSMENT — FIBROSIS 4 INDEX: FIB4 SCORE: 0.72

## 2023-02-10 NOTE — PROGRESS NOTES
Subjective     Odalys Balladr is a 52 y.o. female who presents with Follow-Up (She saw orthor Dr thinks it the Hip and he will order more more MRI )  The patient is here for followup of chronic medical problems listed below. The patient is compliant with medications and having no side effects from them. Denies chest pain, abdominal pain, dyspnea, myalgias, or cough.   Patient Active Problem List   Diagnosis    Mild persistent asthma without complication    Family history of breast cancer gene mutation in first degree relative    White coat syndrome with diagnosis of hypertension    Migraine without aura and with status migrainosus, not intractable    Cervical radiculopathy at C6    Essential hypertension    IFG (impaired fasting glucose)    Vitamin D deficiency    Dyslipidemia    Primary insomnia    S/P bilateral mastectomy    Dissection of vertebral artery (HCC)- left on CTA 6/2021- Dr. Saldana    Costochondral chest pain    Shortness of breath    RSV (respiratory syncytial virus infection)    Abnormal EKG    Hypertensive urgency    Cervical lymphadenitis    Fracture of multiple ribs of left side with routine healing    Mass of thigh, left    Pyriformis syndrome, left    Chronic low back pain with sciatica     Outpatient Medications Prior to Visit   Medication Sig Dispense Refill    Eszopiclone 3 MG Tab Take 3 mg by mouth every evening.      rizatriptan (MAXALT) 10 MG tablet TAKE 1 TABLET BY MOUTH ONCE DAILY AS NEEDED FOR MIGRAINE HEADACHE 12 Tablet 0    baclofen (LIORESAL) 10 MG Tab Take 1 Tablet by mouth 3 times a day as needed (muscle spasms). 90 Tablet 3    budesonide (PULMICORT) 0.5 MG/2ML Suspension USE 1 VIAL IN NEBULIZER TWICE DAILY AS DIRECTED      PREVIDENT 5000 DRY MOUTH 1.1 % Gel       hydroCHLOROthiazide (HYDRODIURIL) 25 MG Tab Take 1 Tablet by mouth every day. 90 Tablet 3    fluticasone (FLONASE) 50 MCG/ACT nasal spray fluticasone propionate 50 mcg/actuation nasal spray,suspension   SHAKE LIQUID AND USE  "2 SPRAYS IN EACH NOSTRIL EVERY DAY      albuterol 108 (90 Base) MCG/ACT Aero Soln inhalation aerosol Inhale 2 Puffs by mouth every 6 hours as needed for Shortness of Breath. 8.5 g 11    amoxicillin (AMOXIL) 500 MG Cap TAKE 1 CAPSULE BY MOUTH THREE TIMES DAILY FOR 14 DAYS      gabapentin (NEURONTIN) 100 MG Cap       pantoprazole (PROTONIX) 40 MG Tablet Delayed Response pantoprazole 40 mg tablet,delayed release   Take 1 tablet every day by oral route.      azelastine (ASTELIN) 137 MCG/SPRAY nasal spray        No facility-administered medications prior to visit.   Since last visit the patient has received cortisone injection in her left hip which helped immensely by orthopedic surgeon.  She will follow-up with the [facility] for this for repeat cortisone injections possibly.    She is seen Dr. Beasley from neurosurgery service we will further evaluate her from a neurosurgical standpoint in terms of her low back pain and hip pain.  Orders for MRI been done for both these areas by him.  She will follow-up with him for further management as well as orthopedics.            HPI    Review of Systems   Constitutional: Negative.    HENT: Negative.     Eyes: Negative.    Respiratory: Negative.     Cardiovascular: Negative.    Gastrointestinal: Negative.    Genitourinary: Negative.    Musculoskeletal:  Positive for back pain, joint pain and myalgias.   Skin: Negative.    Neurological: Negative.    Endo/Heme/Allergies: Negative.    Psychiatric/Behavioral: Negative.              Objective     /82 (BP Location: Right arm, Patient Position: Sitting, BP Cuff Size: Adult)   Pulse (!) 110   Temp 36.4 °C (97.6 °F) (Temporal)   Resp 16   Ht 1.702 m (5' 7\")   Wt 69.4 kg (153 lb)   LMP 10/08/2016   SpO2 98%   BMI 23.96 kg/m²      Physical Exam  Vitals reviewed.   Constitutional:       General: She is not in acute distress.     Appearance: She is well-developed. She is not diaphoretic.   HENT:      Head: " Normocephalic and atraumatic.      Right Ear: External ear normal.      Left Ear: External ear normal.      Nose: Nose normal.      Mouth/Throat:      Pharynx: No oropharyngeal exudate.   Eyes:      General: No scleral icterus.        Right eye: No discharge.         Left eye: No discharge.      Conjunctiva/sclera: Conjunctivae normal.      Pupils: Pupils are equal, round, and reactive to light.   Neck:      Thyroid: No thyromegaly.      Vascular: No JVD.      Trachea: No tracheal deviation.   Cardiovascular:      Rate and Rhythm: Normal rate and regular rhythm.      Heart sounds: Normal heart sounds. No murmur heard.    No friction rub. No gallop.   Pulmonary:      Effort: Pulmonary effort is normal. No respiratory distress.      Breath sounds: Normal breath sounds. No stridor. No wheezing or rales.   Chest:      Chest wall: No tenderness.   Abdominal:      General: Bowel sounds are normal. There is no distension.      Palpations: Abdomen is soft. There is no mass.      Tenderness: There is no abdominal tenderness. There is no guarding or rebound.   Musculoskeletal:         General: Tenderness present. Normal range of motion.      Cervical back: Normal range of motion and neck supple.      Comments: There is only slight tenderness in the left trochanteric bursa.   Lymphadenopathy:      Cervical: No cervical adenopathy.   Skin:     General: Skin is warm and dry.      Coloration: Skin is not pale.      Findings: No erythema or rash.   Neurological:      Mental Status: She is alert and oriented to person, place, and time.      Cranial Nerves: No cranial nerve deficit.      Motor: No abnormal muscle tone.      Coordination: Coordination normal.      Deep Tendon Reflexes: Reflexes are normal and symmetric. Reflexes normal.   Psychiatric:         Behavior: Behavior normal.         Thought Content: Thought content normal.         Judgment: Judgment normal.                       No visits with results within 1 Month(s)  from this visit.   Latest known visit with results is:   Office Visit on 12/21/2022   Component Date Value    Rapid Strep Screen 12/21/2022 Positive     Internal Control Positive 12/21/2022 Positive     Internal Control Negative 12/21/2022 Negative     Rapid Influenza A-B 12/21/2022 negative     Internal Control Positive 12/21/2022 Positive     Internal Control Negative 12/21/2022 Negative       .SLLL  Lab Results   Component Value Date/Time    HBA1C 5.6 01/24/2022 09:37 AM    HBA1C 5.3 03/15/2021 08:08 AM     Lab Results   Component Value Date/Time    SODIUM 140 04/09/2022 11:22 AM    POTASSIUM 4.2 04/09/2022 11:22 AM    CHLORIDE 103 04/09/2022 11:22 AM    CO2 26 04/09/2022 11:22 AM    GLUCOSE 109 (H) 04/09/2022 11:22 AM    BUN 25 (H) 04/09/2022 11:22 AM    CREATININE 0.85 04/09/2022 11:22 AM    BUNCREATRAT 26 (H) 09/14/2020 04:46 AM    ALKPHOSPHAT 69 07/25/2022 09:05 AM    ASTSGOT 20 07/25/2022 09:05 AM    ALTSGPT 17 07/25/2022 09:05 AM    TBILIRUBIN 0.4 07/25/2022 09:05 AM     No results found for: INR  Lab Results   Component Value Date/Time    CHOLSTRLTOT 207 (H) 01/24/2022 09:37 AM     (H) 01/24/2022 09:37 AM    HDL 83 01/24/2022 09:37 AM    TRIGLYCERIDE 47 01/24/2022 09:37 AM       No results found for: TESTOSTERONE  Lab Results   Component Value Date/Time    TSH 1.490 06/19/2020 06:34 AM     No results found for: FREET4  No results found for: URICACID  No components found for: VITB12  Lab Results   Component Value Date/Time    25HYDROXY 38 01/24/2022 09:37 AM    25HYDROXY 50 03/15/2021 08:08 AM   '       Assessment & Plan        1. Chronic left hip pain- ellie- dr jenikns  Patient wants to go back on gabapentin after stopping it.  She will take this to help her sleep at night and with also back and hip pain.  - gabapentin (NEURONTIN) 100 MG Cap; Take 1 Capsule by mouth 3 times a day.  Dispense: 90 Capsule; Refill: 3  - celecoxib (CELEBREX) 200 MG Cap; Take 1 Capsule by mouth 2 times a day.  Dispense:  60 Capsule; Refill: 5    2. Chronic low back pain with sciatica, sciatica laterality unspecified, unspecified back pain laterality- dr licea   As above.  Follow-up with Ortho and neurosurgery both.    3. Pyriformis syndrome, left  Possible diagnosis as determined by orthopedist and neurosurgeon.  Consider cortisone injections to this area.  Possible physical therapy.  Further management per them.    4. Vitamin D deficiency  Good control continue 1000 units vitamin D3 daily.  - VITAMIN D,25 HYDROXY (DEFICIENCY); Future    5. Dyslipidemia-good control without medication.  Continue measuring diet and exercise.   The 10-year ASCVD risk score (Sayra HOWARD, et al., 2019) is: 1.1%    Values used to calculate the score:      Age: 52 years      Sex: Female      Is Non- : No      Diabetic: No      Tobacco smoker: No      Systolic Blood Pressure: 130 mmHg      Is BP treated: Yes      HDL Cholesterol: 83 mg/dL      Total Cholesterol: 207 mg/dL    - TSH; Future  - Comp Metabolic Panel; Future  - Lipid Profile; Future  - CBC WITH DIFFERENTIAL; Future  - CRP HIGH SENSITIVE (CARDIAC); Future    6. IFG (impaired fasting glucose)  Measuring diet and exercise  - HEMOGLOBIN A1C; Future    7. Primary insomnia  Good control continue current regimen  - gabapentin (NEURONTIN) 100 MG Cap; Take 1 Capsule by mouth 3 times a day.  Dispense: 90 Capsule; Refill: 3  - celecoxib (CELEBREX) 200 MG Cap; Take 1 Capsule by mouth 2 times a day.  Dispense: 60 Capsule; Refill: 5    8. Need for vaccination     - Zoster Vac Recomb Adjuvanted (SHINGRIX) 50 MCG/0.5ML Recon Susp; Inject 0.5 mL into the shoulder, thigh, or buttocks one time for 1 dose.  Dispense: 0.5 mL; Refill: 0  - Hepatitis B Vaccine Adult 20+  - Pneumococcal Conjugate Vaccine 20-Valent (19 yrs+)    9. Need for hepatitis C screening test      - HCV Scrn ( [id number] 1xLife); Future

## 2023-04-03 ENCOUNTER — HOSPITAL ENCOUNTER (OUTPATIENT)
Dept: RADIOLOGY | Facility: MEDICAL CENTER | Age: 53
End: 2023-04-03
Attending: SURGERY
Payer: COMMERCIAL

## 2023-04-03 DIAGNOSIS — R92.8 MAMMOGRAM ABNORMAL: ICD-10-CM

## 2023-04-03 PROCEDURE — G0279 TOMOSYNTHESIS, MAMMO: HCPCS

## 2023-04-03 PROCEDURE — 76642 ULTRASOUND BREAST LIMITED: CPT | Mod: RT

## 2023-04-09 ENCOUNTER — OFFICE VISIT (OUTPATIENT)
Dept: URGENT CARE | Facility: CLINIC | Age: 53
End: 2023-04-09
Payer: COMMERCIAL

## 2023-04-09 VITALS
HEIGHT: 67 IN | OXYGEN SATURATION: 96 % | BODY MASS INDEX: 24.64 KG/M2 | TEMPERATURE: 97.8 F | WEIGHT: 157 LBS | RESPIRATION RATE: 16 BRPM | HEART RATE: 98 BPM

## 2023-04-09 DIAGNOSIS — B96.89 BACTERIAL RESPIRATORY INFECTION: ICD-10-CM

## 2023-04-09 DIAGNOSIS — J98.8 BACTERIAL RESPIRATORY INFECTION: ICD-10-CM

## 2023-04-09 PROCEDURE — 99213 OFFICE O/P EST LOW 20 MIN: CPT | Performed by: PHYSICIAN ASSISTANT

## 2023-04-09 RX ORDER — AZITHROMYCIN 250 MG/1
TABLET, FILM COATED ORAL
Qty: 6 TABLET | Refills: 0 | Status: SHIPPED | OUTPATIENT
Start: 2023-04-09 | End: 2023-05-18

## 2023-04-09 ASSESSMENT — ENCOUNTER SYMPTOMS
SINUS PAIN: 1
WHEEZING: 0
VOMITING: 0
SWOLLEN GLANDS: 0
SPUTUM PRODUCTION: 1
NAUSEA: 0
COUGH: 1
ABDOMINAL PAIN: 0
SINUS PRESSURE: 1
SORE THROAT: 1
CHILLS: 0
FEVER: 0
NECK PAIN: 0
DIARRHEA: 0
SHORTNESS OF BREATH: 0
MYALGIAS: 0
HEADACHES: 1
HOARSE VOICE: 0

## 2023-04-09 ASSESSMENT — FIBROSIS 4 INDEX: FIB4 SCORE: 0.72

## 2023-04-09 NOTE — PROGRESS NOTES
Subjective     Odalys Ballard is a 52 y.o. female who presents with Sinus Problem (Cough,  mucus green ,sore throat . Post nasal. Pressure in sinus pain left ear x 3 days)            Sinus Problem  This is a new problem. Episode onset: 2 weeks. Progression since onset: improved with Amoxicillin- symptoms have worsened since stopping the antibiotic and moving into chest. There has been no fever. The pain is moderate. Associated symptoms include congestion, coughing (productive of green mucous), ear pain (left ear, left sinus pain.), headaches, sinus pressure and a sore throat. Pertinent negatives include no chills, hoarse voice, neck pain, shortness of breath, sneezing or swollen glands. Past treatments include oral decongestants (Albuterol inhaler). The treatment provided mild relief.         Past Medical History:   Diagnosis Date    Anesthesia     PONV     ASTHMA     prn inhaler    At high risk for breast cancer- prophylactic bilateral mastectomy TBD November 2020 10/1/2020    Bronchitis 2015    Excessive sodium intake 10/1/2020    Heart burn     Hiatus hernia syndrome     Hypertension     Other specified symptom associated with female genital organs     endometriosis, fibroids    Pneumonia 2010     Past Surgical History:   Procedure Laterality Date    BREAST RECONSTRUCTION Bilateral 2/11/2021    Procedure: RECONSTRUCTION, BREAST - AND REVISION WITH DERMAL GLANDULAR FLAPS, CAPSULECTOMY;  Surgeon: Amos Dang M.D.;  Location: SURGERY SAME DAY [facility];  Service: Plastics    EXCISION,FAT GRAFT  2/11/2021    Procedure: EXCISION, FAT GRAFT- FOR FAT GRAFTING;  Surgeon: Amos Dang M.D.;  Location: SURGERY SAME DAY [facility];  Service: Plastics    TISSUE EXPANDER PLACE/REMOVE Bilateral 2/11/2021    Procedure: INSERTION OR REMOVAL, TISSUE EXPANDER;  Surgeon: Amos Dang M.D.;  Location: SURGERY SAME DAY [facility];  Service: Plastics    BREAST IMPLANT REVISION Bilateral 2/11/2021    Procedure: INSERTION,  IMPLANT, BREAST;  Surgeon: Amos Dang M.D.;  Location: SURGERY SAME DAY [facility];  Service: Plastics    LIPOSUCTION  2/11/2021    Procedure: LIPOSUCTION-ABDOMEN, FLANKS, HIPS OUTTER THIGHS, AND UPPER BACK;  Surgeon: Amos Dang M.D.;  Location: SURGERY SAME DAY [facility];  Service: Plastics    PB MASTECTOMY, SIMPLE, COMPLETE Bilateral 11/5/2020    Procedure: MASTECTOMY;  Surgeon: Franny Felix M.D.;  Location: SURGERY [facility];  Service: General    BREAST RECONSTRUCTION Bilateral 11/5/2020    Procedure: RECONSTRUCTION, BREAST;  Surgeon: Amos Dang M.D.;  Location: SURGERY [facility];  Service: Plastics    TISSUE EXPANDER PLACE/REMOVE Bilateral 11/5/2020    Procedure: TISSUE EXPANDER- FOR PLACEMENT  AND USE OF ACELLULAR DERMAL MATRIX;  Surgeon: Amos Dang M.D.;  Location: SURGERY [facility];  Service: Plastics    HYSTERECTOMY ROBOTIC N/A 10/17/2016    Procedure: HYSTERECTOMY ROBOTIC, BILATERAL SALPINGECTOMY;  Surgeon: Yamilet Trammell M.D.;  Location: SURGERY [facility];  Service:     CYSTOSCOPY N/A 10/17/2016    Procedure: CYSTOSCOPY;  Surgeon: Yamilet Trammell M.D.;  Location: [facility];  Service:     PELVISCOPY  9/6/2013    Performed by Spencer [facility] M.D. at [facility]    CHOLECYSTECTOMY  2009    laparoscopic    LAPAROSCOPY  2000, 2008           Family History   Problem Relation Age of Onset    Diabetes Other     Heart Disease Other     Hypertension Other     Cancer Other        Allergies:  Lisinopril and Losartan        Medications, Allergies, and current problem list reviewed today in Epic    Review of Systems   Constitutional:  Positive for malaise/fatigue. Negative for chills and fever.   HENT:  Positive for congestion, ear pain (left ear, left sinus pain.), sinus pressure, sinus pain and sore throat. Negative for ear discharge, hoarse voice and sneezing.    Respiratory:  Positive for cough (productive of green mucous) and sputum  "production. Negative for shortness of breath and wheezing.    Gastrointestinal:  Negative for abdominal pain, diarrhea, nausea and vomiting.   Musculoskeletal:  Negative for myalgias and neck pain.   Skin:  Negative for rash.   Neurological:  Positive for headaches.       All other systems reviewed and are negative.     Objective       BP: 132/96  Pulse 98   Temp 36.6 °C (97.8 °F) (Temporal)   Resp 16   Ht 1.702 m (5' 7\")   Wt 71.2 kg (157 lb)   LMP 10/08/2016   SpO2 96%   BMI 24.59 kg/m²      Physical Exam  Constitutional:       General: She is not in acute distress.     Appearance: She is not ill-appearing.   HENT:      Head: Normocephalic and atraumatic.      Right Ear: Tympanic membrane, ear canal and external ear normal.      Left Ear: Ear canal and external ear normal. A middle ear effusion is present. Tympanic membrane is not injected.      Nose: Mucosal edema, congestion and rhinorrhea present.      Left Sinus: Maxillary sinus tenderness present.   Cardiovascular:      Rate and Rhythm: Normal rate and regular rhythm.      Heart sounds: Normal heart sounds.   Pulmonary:      Effort: Pulmonary effort is normal. No respiratory distress.      Breath sounds: No stridor. No wheezing, rhonchi or rales.   Lymphadenopathy:      Cervical: No cervical adenopathy.   Skin:     General: Skin is warm and dry.   Neurological:      General: No focal deficit present.      Mental Status: She is alert and oriented to person, place, and time.   Psychiatric:         Mood and Affect: Mood normal.         Behavior: Behavior normal.         Thought Content: Thought content normal.         Judgment: Judgment normal.                           Assessment & Plan        1. Bacterial respiratory infection    - azithromycin (ZITHROMAX) 250 MG Tab; Take as directed on package. 1 pack.  Dispense: 6 Tablet; Refill: 0       Differential diagnoses, Supportive care, and indications for immediate follow-up discussed with patient. "   Pathogenesis of diagnosis discussed including typical length and natural progression.   Instructed to return to clinic or nearest emergency department for any change in condition, further concerns, or worsening of symptoms.      The patient demonstrated a good understanding and agreed with the treatment plan.      Cari Mcbride P.A.-C.

## 2023-05-07 DIAGNOSIS — M62.830 MUSCLE SPASM OF BACK: ICD-10-CM

## 2023-05-08 RX ORDER — BACLOFEN 10 MG/1
TABLET ORAL
Qty: 90 TABLET | Refills: 0 | Status: SHIPPED | OUTPATIENT
Start: 2023-05-08 | End: 2023-06-07

## 2023-05-18 ENCOUNTER — OFFICE VISIT (OUTPATIENT)
Dept: MEDICAL GROUP | Age: 53
End: 2023-05-18
Payer: COMMERCIAL

## 2023-05-18 VITALS
OXYGEN SATURATION: 98 % | DIASTOLIC BLOOD PRESSURE: 78 MMHG | SYSTOLIC BLOOD PRESSURE: 120 MMHG | HEART RATE: 88 BPM | HEIGHT: 67 IN | BODY MASS INDEX: 23.54 KG/M2 | WEIGHT: 150 LBS | TEMPERATURE: 96.9 F

## 2023-05-18 DIAGNOSIS — I10 WHITE COAT SYNDROME WITH DIAGNOSIS OF HYPERTENSION: ICD-10-CM

## 2023-05-18 DIAGNOSIS — G43.001 MIGRAINE WITHOUT AURA AND WITH STATUS MIGRAINOSUS, NOT INTRACTABLE: ICD-10-CM

## 2023-05-18 DIAGNOSIS — G89.29 CHRONIC LOW BACK PAIN WITH SCIATICA, SCIATICA LATERALITY UNSPECIFIED, UNSPECIFIED BACK PAIN LATERALITY: ICD-10-CM

## 2023-05-18 DIAGNOSIS — Z23 IMMUNIZATION DUE: ICD-10-CM

## 2023-05-18 DIAGNOSIS — F51.01 PRIMARY INSOMNIA: ICD-10-CM

## 2023-05-18 DIAGNOSIS — E78.5 DYSLIPIDEMIA: ICD-10-CM

## 2023-05-18 DIAGNOSIS — E55.9 VITAMIN D DEFICIENCY: ICD-10-CM

## 2023-05-18 DIAGNOSIS — J45.30 MILD PERSISTENT ASTHMA WITHOUT COMPLICATION: ICD-10-CM

## 2023-05-18 DIAGNOSIS — K20.0 EOSINOPHILIC ESOPHAGITIS: ICD-10-CM

## 2023-05-18 DIAGNOSIS — R73.01 IFG (IMPAIRED FASTING GLUCOSE): ICD-10-CM

## 2023-05-18 DIAGNOSIS — I10 ESSENTIAL HYPERTENSION: ICD-10-CM

## 2023-05-18 DIAGNOSIS — M54.40 CHRONIC LOW BACK PAIN WITH SCIATICA, SCIATICA LATERALITY UNSPECIFIED, UNSPECIFIED BACK PAIN LATERALITY: ICD-10-CM

## 2023-05-18 PROBLEM — B33.8 RSV (RESPIRATORY SYNCYTIAL VIRUS INFECTION): Status: RESOLVED | Noted: 2022-03-27 | Resolved: 2023-05-18

## 2023-05-18 PROBLEM — R22.42 MASS OF THIGH, LEFT: Status: RESOLVED | Noted: 2022-09-21 | Resolved: 2023-05-18

## 2023-05-18 PROBLEM — G57.02 PYRIFORMIS SYNDROME, LEFT: Status: RESOLVED | Noted: 2023-02-09 | Resolved: 2023-05-18

## 2023-05-18 PROBLEM — I88.9 CERVICAL LYMPHADENITIS: Status: RESOLVED | Noted: 2022-06-01 | Resolved: 2023-05-18

## 2023-05-18 PROBLEM — I16.0 HYPERTENSIVE URGENCY: Status: RESOLVED | Noted: 2022-03-27 | Resolved: 2023-05-18

## 2023-05-18 PROBLEM — R07.89 COSTOCHONDRAL CHEST PAIN: Status: RESOLVED | Noted: 2022-03-27 | Resolved: 2023-05-18

## 2023-05-18 PROBLEM — R06.02 SHORTNESS OF BREATH: Status: RESOLVED | Noted: 2022-03-27 | Resolved: 2023-05-18

## 2023-05-18 PROBLEM — R94.31 ABNORMAL EKG: Status: RESOLVED | Noted: 2022-03-27 | Resolved: 2023-05-18

## 2023-05-18 PROCEDURE — 3078F DIAST BP <80 MM HG: CPT | Performed by: INTERNAL MEDICINE

## 2023-05-18 PROCEDURE — 99214 OFFICE O/P EST MOD 30 MIN: CPT | Mod: 25 | Performed by: INTERNAL MEDICINE

## 2023-05-18 PROCEDURE — 90471 IMMUNIZATION ADMIN: CPT | Performed by: INTERNAL MEDICINE

## 2023-05-18 PROCEDURE — 90746 HEPB VACCINE 3 DOSE ADULT IM: CPT | Performed by: INTERNAL MEDICINE

## 2023-05-18 PROCEDURE — 3074F SYST BP LT 130 MM HG: CPT | Performed by: INTERNAL MEDICINE

## 2023-05-18 RX ORDER — MULTIVIT-MIN/IRON/FOLIC ACID/K 18-600-40
2000 CAPSULE ORAL DAILY
Qty: 100 CAPSULE | Refills: 3 | Status: SHIPPED
Start: 2023-05-18 | End: 2023-10-15

## 2023-05-18 ASSESSMENT — FIBROSIS 4 INDEX: FIB4 SCORE: 0.72

## 2023-05-18 ASSESSMENT — ENCOUNTER SYMPTOMS
RESPIRATORY NEGATIVE: 1
CARDIOVASCULAR NEGATIVE: 1
CONSTITUTIONAL NEGATIVE: 1
PSYCHIATRIC NEGATIVE: 1
EYES NEGATIVE: 1
GASTROINTESTINAL NEGATIVE: 1
NEUROLOGICAL NEGATIVE: 1

## 2023-05-18 ASSESSMENT — PATIENT HEALTH QUESTIONNAIRE - PHQ9: CLINICAL INTERPRETATION OF PHQ2 SCORE: 0

## 2023-05-18 NOTE — PROGRESS NOTES
Subjective     Odalys Ballard is a 52 y.o. female who presents with Follow-Up  The patient is here for followup of chronic medical problems listed below. The patient is compliant with medications and having no side effects from them. Denies chest pain, abdominal pain, dyspnea, myalgias, or cough.     Patient however continues to complain of mild left thigh pain of unclear etiology.  Possibly secondary to prior RSV infection she had she thinks.  Nevertheless she is getting better with physical therapy and stretching and strengthening exercises.  She again 20 pounds he says but since his last and seen back in a regular exercise program.            Patient Active Problem List   Diagnosis    Mild persistent asthma without complication    Family history of breast cancer gene mutation in first degree relative    Migraine without aura and with status migrainosus, not intractable    Cervical radiculopathy at C6    Essential hypertension    IFG (impaired fasting glucose)    Vitamin D deficiency    Dyslipidemia    Primary insomnia    S/P bilateral mastectomy    Dissection of vertebral artery (HCC)- left on CTA 6/2021- Dr. Saldana    Fracture of multiple ribs of left side with routine healing    Chronic low back pain with sciatica     Outpatient Medications Prior to Visit   Medication Sig Dispense Refill    baclofen (LIORESAL) 10 MG Tab Take 1 tablet by mouth three times daily as needed for muscle spasm 90 Tablet 0    rizatriptan (MAXALT) 10 MG tablet TAKE 1 TABLET BY MOUTH ONCE DAILY AS NEEDED FOR MIGRAINE HEADACHE 12 Tablet 5    Eszopiclone 3 MG Tab Take 1 tablet by mouth in the evening 90 Tablet 1    gabapentin (NEURONTIN) 100 MG Cap Take 1 Capsule by mouth 3 times a day. 90 Capsule 3    celecoxib (CELEBREX) 200 MG Cap Take 1 Capsule by mouth 2 times a day. 60 Capsule 5    budesonide (PULMICORT) 0.5 MG/2ML Suspension USE 1 VIAL IN NEBULIZER TWICE DAILY AS DIRECTED      PREVIDENT 5000 DRY MOUTH 1.1 % Gel        "hydroCHLOROthiazide (HYDRODIURIL) 25 MG Tab Take 1 Tablet by mouth every day. 90 Tablet 3    albuterol 108 (90 Base) MCG/ACT Aero Soln inhalation aerosol Inhale 2 Puffs by mouth every 6 hours as needed for Shortness of Breath. 8.5 g 11    azithromycin (ZITHROMAX) 250 MG Tab Take as directed on package. 1 pack. 6 Tablet 0    fluticasone (FLONASE) 50 MCG/ACT nasal spray fluticasone propionate 50 mcg/actuation nasal spray,suspension   SHAKE LIQUID AND USE 2 SPRAYS IN EACH NOSTRIL EVERY DAY       No facility-administered medications prior to visit.               HPI    Review of Systems   Constitutional: Negative.    HENT: Negative.     Eyes: Negative.    Respiratory: Negative.     Cardiovascular: Negative.    Gastrointestinal: Negative.    Genitourinary: Negative.    Musculoskeletal:  Positive for joint pain.   Skin: Negative.    Neurological: Negative.    Endo/Heme/Allergies: Negative.    Psychiatric/Behavioral: Negative.                Objective     /78 (BP Location: Left arm, Patient Position: Sitting, BP Cuff Size: Adult)   Pulse 88   Temp 36.1 °C (96.9 °F) (Temporal)   Ht 1.702 m (5' 7\")   Wt 68 kg (150 lb)   LMP 10/08/2016   SpO2 98%   BMI 23.49 kg/m²      Physical Exam  Vitals reviewed.   Constitutional:       General: She is not in acute distress.     Appearance: She is well-developed. She is not diaphoretic.   HENT:      Head: Normocephalic and atraumatic.      Right Ear: External ear normal.      Left Ear: External ear normal.      Nose: Nose normal.      Mouth/Throat:      Pharynx: No oropharyngeal exudate.   Eyes:      General: No scleral icterus.        Right eye: No discharge.         Left eye: No discharge.      Conjunctiva/sclera: Conjunctivae normal.      Pupils: Pupils are equal, round, and reactive to light.   Neck:      Thyroid: No thyromegaly.      Vascular: No JVD.      Trachea: No tracheal deviation.   Cardiovascular:      Rate and Rhythm: Normal rate and regular rhythm.      Heart " sounds: Normal heart sounds. No murmur heard.     No friction rub. No gallop.   Pulmonary:      Effort: Pulmonary effort is normal. No respiratory distress.      Breath sounds: Normal breath sounds. No stridor. No wheezing or rales.   Chest:      Chest wall: No tenderness.   Abdominal:      General: Bowel sounds are normal. There is no distension.      Palpations: Abdomen is soft. There is no mass.      Tenderness: There is no abdominal tenderness. There is no guarding or rebound.   Musculoskeletal:         General: No tenderness. Normal range of motion.      Cervical back: Normal range of motion and neck supple.   Lymphadenopathy:      Cervical: No cervical adenopathy.   Skin:     General: Skin is warm and dry.      Coloration: Skin is not pale.      Findings: No erythema or rash.   Neurological:      Mental Status: She is alert and oriented to person, place, and time.      Cranial Nerves: No cranial nerve deficit.      Motor: No abnormal muscle tone.      Coordination: Coordination normal.      Deep Tendon Reflexes: Reflexes are normal and symmetric. Reflexes normal.   Psychiatric:         Behavior: Behavior normal.         Thought Content: Thought content normal.         Judgment: Judgment normal.       No visits with results within 1 Month(s) from this visit.   Latest known visit with results is:   Office Visit on 12/21/2022   Component Date Value    Rapid Strep Screen 12/21/2022 Positive     Internal Control Positive 12/21/2022 Positive     Internal Control Negative 12/21/2022 Negative     Rapid Influenza A-B 12/21/2022 negative     Internal Control Positive 12/21/2022 Positive     Internal Control Negative 12/21/2022 Negative       Lab Results   Component Value Date/Time    HBA1C 5.6 01/24/2022 09:37 AM    HBA1C 5.3 03/15/2021 08:08 AM     Lab Results   Component Value Date/Time    SODIUM 140 04/09/2022 11:22 AM    POTASSIUM 4.2 04/09/2022 11:22 AM    CHLORIDE 103 04/09/2022 11:22 AM    CO2 26 04/09/2022 11:22  AM    GLUCOSE 109 (H) 04/09/2022 11:22 AM    BUN 25 (H) 04/09/2022 11:22 AM    CREATININE 0.85 04/09/2022 11:22 AM    BUNCREATRAT 26 (H) 09/14/2020 04:46 AM    ALKPHOSPHAT 69 07/25/2022 09:05 AM    ASTSGOT 20 07/25/2022 09:05 AM    ALTSGPT 17 07/25/2022 09:05 AM    TBILIRUBIN 0.4 07/25/2022 09:05 AM     No results found for: INR  Lab Results   Component Value Date/Time    CHOLSTRLTOT 207 (H) 01/24/2022 09:37 AM     (H) 01/24/2022 09:37 AM    HDL 83 01/24/2022 09:37 AM    TRIGLYCERIDE 47 01/24/2022 09:37 AM       No results found for: TESTOSTERONE  Lab Results   Component Value Date/Time    TSH 1.490 06/19/2020 06:34 AM     No results found for: FREET4  No results found for: URICACID  No components found for: VITB12  Lab Results   Component Value Date/Time    25HYDROXY 38 01/24/2022 09:37 AM    25HYDROXY 50 03/15/2021 08:08 AM                             Assessment & Plan        1. Immunization due     - Hepatitis B Vaccine Adult IM    2. White coat syndrome with diagnosis of hypertension  This seems better controlled now.  She will continue her hydrochlorothiazide 25 mg daily.    3. Mild persistent asthma without complication  Under good control with albuterol inhaler as needed..  No inhaled steroids.    4. Essential hypertension  Under good control with HCTZ 25 mg daily.  Continue unchanged as well as with low-sodium diet and exercise.    5. Migraine without aura and with status migrainosus, not intractable  Under good control with Maxalt as needed.  Consider Topamax in the future should she continue have headaches.    6. IFG (impaired fasting glucose)  Mediterranean diet and exercise.  Under good control with this measures.    7. Vitamin D deficiency  Under good control with vitamin D supplements 2000 units daily.  Continue unchanged.    8. Dyslipidemia  Mentoring diet and exercise.  Continue with this measures as it seems to be working quite well for medicine.    9. Primary insomnia  Continue with Lunesta  3 mg at bedtime.  Under good control with this    10. Chronic low back pain with sciatica, sciatica laterality unspecified, unspecified back pain laterality  Continue follow-up with physical medicine and physical therapy.

## 2023-05-19 LAB
25(OH)D3+25(OH)D2 SERPL-MCNC: 30 NG/ML (ref 30–100)
ALBUMIN SERPL-MCNC: 5.3 G/DL (ref 3.8–4.8)
ALBUMIN/GLOB SERPL: 2.5 {RATIO} (ref 1.2–2.2)
ALP SERPL-CCNC: 63 IU/L (ref 44–121)
ALT SERPL-CCNC: 21 IU/L (ref 0–32)
AST SERPL-CCNC: 20 IU/L (ref 0–40)
BASOPHILS # BLD AUTO: 0.1 X10E3/UL (ref 0–0.2)
BASOPHILS NFR BLD AUTO: 2 %
BILIRUB SERPL-MCNC: 0.5 MG/DL (ref 0–1.2)
BUN SERPL-MCNC: 22 MG/DL (ref 6–24)
BUN/CREAT SERPL: 24 (ref 9–23)
CALCIUM SERPL-MCNC: 10 MG/DL (ref 8.7–10.2)
CHLORIDE SERPL-SCNC: 101 MMOL/L (ref 96–106)
CHOLEST SERPL-MCNC: 255 MG/DL (ref 100–199)
CO2 SERPL-SCNC: 25 MMOL/L (ref 20–29)
CREAT SERPL-MCNC: 0.9 MG/DL (ref 0.57–1)
CRP SERPL HS-MCNC: 1.07 MG/L (ref 0–3)
EGFRCR SERPLBLD CKD-EPI 2021: 78 ML/MIN/1.73
EOSINOPHIL # BLD AUTO: 0.2 X10E3/UL (ref 0–0.4)
EOSINOPHIL NFR BLD AUTO: 4 %
ERYTHROCYTE [DISTWIDTH] IN BLOOD BY AUTOMATED COUNT: 11.4 % (ref 11.7–15.4)
GLOBULIN SER CALC-MCNC: 2.1 G/DL (ref 1.5–4.5)
GLUCOSE SERPL-MCNC: 98 MG/DL (ref 70–99)
HBA1C MFR BLD: 5.6 % (ref 4.8–5.6)
HCT VFR BLD AUTO: 44.3 % (ref 34–46.6)
HCV IGG SERPL QL IA: NON REACTIVE
HDLC SERPL-MCNC: 83 MG/DL
HGB BLD-MCNC: 15.3 G/DL (ref 11.1–15.9)
IMM GRANULOCYTES # BLD AUTO: 0 X10E3/UL (ref 0–0.1)
IMM GRANULOCYTES NFR BLD AUTO: 0 %
IMMATURE CELLS  115398: ABNORMAL
LABORATORY COMMENT REPORT: ABNORMAL
LDLC SERPL CALC-MCNC: 163 MG/DL (ref 0–99)
LYMPHOCYTES # BLD AUTO: 1.5 X10E3/UL (ref 0.7–3.1)
LYMPHOCYTES NFR BLD AUTO: 36 %
MCH RBC QN AUTO: 32.8 PG (ref 26.6–33)
MCHC RBC AUTO-ENTMCNC: 34.5 G/DL (ref 31.5–35.7)
MCV RBC AUTO: 95 FL (ref 79–97)
MONOCYTES # BLD AUTO: 0.4 X10E3/UL (ref 0.1–0.9)
MONOCYTES NFR BLD AUTO: 9 %
MORPHOLOGY BLD-IMP: ABNORMAL
NEUTROPHILS # BLD AUTO: 2 X10E3/UL (ref 1.4–7)
NEUTROPHILS NFR BLD AUTO: 49 %
NRBC BLD AUTO-RTO: ABNORMAL %
PLATELET # BLD AUTO: 278 X10E3/UL (ref 150–450)
POTASSIUM SERPL-SCNC: 3.8 MMOL/L (ref 3.5–5.2)
PROT SERPL-MCNC: 7.4 G/DL (ref 6–8.5)
RBC # BLD AUTO: 4.67 X10E6/UL (ref 3.77–5.28)
SODIUM SERPL-SCNC: 140 MMOL/L (ref 134–144)
TRIGL SERPL-MCNC: 56 MG/DL (ref 0–149)
TSH SERPL DL<=0.005 MIU/L-ACNC: 1.74 UIU/ML (ref 0.45–4.5)
VLDLC SERPL CALC-MCNC: 9 MG/DL (ref 5–40)
WBC # BLD AUTO: 4.1 X10E3/UL (ref 3.4–10.8)

## 2023-06-01 ENCOUNTER — HOSPITAL ENCOUNTER (OUTPATIENT)
Dept: RADIOLOGY | Facility: MEDICAL CENTER | Age: 53
End: 2023-06-01
Attending: SURGERY
Payer: COMMERCIAL

## 2023-06-01 DIAGNOSIS — Z80.3 FAMILY HISTORY OF MALIGNANT NEOPLASM OF BREAST: ICD-10-CM

## 2023-06-01 PROCEDURE — C8908 MRI W/O FOL W/CONT, BREAST,: HCPCS

## 2023-06-01 PROCEDURE — 700117 HCHG RX CONTRAST REV CODE 255: Performed by: SURGERY

## 2023-06-01 PROCEDURE — A9579 GAD-BASE MR CONTRAST NOS,1ML: HCPCS | Performed by: SURGERY

## 2023-06-01 RX ADMIN — GADOTERIDOL 15 ML: 279.3 INJECTION, SOLUTION INTRAVENOUS at 14:42

## 2023-06-07 DIAGNOSIS — M62.830 MUSCLE SPASM OF BACK: ICD-10-CM

## 2023-06-07 RX ORDER — BACLOFEN 10 MG/1
TABLET ORAL
Qty: 90 TABLET | Refills: 0 | Status: SHIPPED | OUTPATIENT
Start: 2023-06-07 | End: 2023-07-03

## 2023-06-08 ENCOUNTER — OFFICE VISIT (OUTPATIENT)
Dept: MEDICAL GROUP | Age: 53
End: 2023-06-08
Payer: COMMERCIAL

## 2023-06-08 VITALS
HEART RATE: 109 BPM | OXYGEN SATURATION: 98 % | WEIGHT: 147 LBS | SYSTOLIC BLOOD PRESSURE: 124 MMHG | BODY MASS INDEX: 23.07 KG/M2 | HEIGHT: 67 IN | TEMPERATURE: 97.6 F | DIASTOLIC BLOOD PRESSURE: 84 MMHG

## 2023-06-08 DIAGNOSIS — Z79.890 POST-MENOPAUSE ON HRT (HORMONE REPLACEMENT THERAPY): ICD-10-CM

## 2023-06-08 DIAGNOSIS — E78.5 DYSLIPIDEMIA: ICD-10-CM

## 2023-06-08 DIAGNOSIS — E55.9 VITAMIN D DEFICIENCY: ICD-10-CM

## 2023-06-08 DIAGNOSIS — R73.01 IFG (IMPAIRED FASTING GLUCOSE): ICD-10-CM

## 2023-06-08 DIAGNOSIS — B00.9 HSV-1 (HERPES SIMPLEX VIRUS 1) INFECTION: ICD-10-CM

## 2023-06-08 PROCEDURE — 99214 OFFICE O/P EST MOD 30 MIN: CPT | Performed by: INTERNAL MEDICINE

## 2023-06-08 PROCEDURE — 3079F DIAST BP 80-89 MM HG: CPT | Performed by: INTERNAL MEDICINE

## 2023-06-08 PROCEDURE — 3074F SYST BP LT 130 MM HG: CPT | Performed by: INTERNAL MEDICINE

## 2023-06-08 RX ORDER — ACYCLOVIR 400 MG/1
400 TABLET ORAL 3 TIMES DAILY
Qty: 30 TABLET | Refills: 3 | Status: SHIPPED | OUTPATIENT
Start: 2023-06-08 | End: 2023-10-13

## 2023-06-08 RX ORDER — ESTRADIOL 0.03 MG/D
1 FILM, EXTENDED RELEASE TRANSDERMAL
Qty: 8 PATCH | Refills: 3 | Status: SHIPPED | DISCHARGE
Start: 2023-06-08 | End: 2024-03-08

## 2023-06-08 ASSESSMENT — ENCOUNTER SYMPTOMS
GASTROINTESTINAL NEGATIVE: 1
RESPIRATORY NEGATIVE: 1
MUSCULOSKELETAL NEGATIVE: 1
EYES NEGATIVE: 1
CONSTITUTIONAL NEGATIVE: 1
PSYCHIATRIC NEGATIVE: 1
CARDIOVASCULAR NEGATIVE: 1
NEUROLOGICAL NEGATIVE: 1

## 2023-06-08 ASSESSMENT — FIBROSIS 4 INDEX: FIB4 SCORE: 0.78

## 2023-06-08 NOTE — PROGRESS NOTES
Subjective     Odalys Ballard is a 52 y.o. female who presents with Follow-Up (Lab review )  The patient is here for followup of chronic medical problems listed below. The patient is compliant with medications and having no side effects from them. Denies chest pain, abdominal pain, dyspnea, myalgias, or cough.   Patient Active Problem List   Diagnosis    Mild persistent asthma without complication    Family history of breast cancer gene mutation in first degree relative    Migraine without aura and with status migrainosus, not intractable    Cervical radiculopathy at C6    Essential hypertension    IFG (impaired fasting glucose)    Vitamin D deficiency    Dyslipidemia    Primary insomnia    S/P bilateral mastectomy    Dissection of vertebral artery (HCC)- left on CTA 6/2021- Dr. Saldana    Fracture of multiple ribs of left side with routine healing    Chronic low back pain with sciatica    Eosinophilic esophagitis     Outpatient Medications Prior to Visit   Medication Sig Dispense Refill    baclofen (LIORESAL) 10 MG Tab Take 1 tablet by mouth three times daily as needed for muscle spasm 90 Tablet 0    Cholecalciferol (VITAMIN D) 50 MCG (2000 UT) Cap Take 1 Capsule by mouth every day. 100 Capsule 3    rizatriptan (MAXALT) 10 MG tablet TAKE 1 TABLET BY MOUTH ONCE DAILY AS NEEDED FOR MIGRAINE HEADACHE 12 Tablet 5    gabapentin (NEURONTIN) 100 MG Cap Take 1 Capsule by mouth 3 times a day. 90 Capsule 3    celecoxib (CELEBREX) 200 MG Cap Take 1 Capsule by mouth 2 times a day. 60 Capsule 5    budesonide (PULMICORT) 0.5 MG/2ML Suspension USE 1 VIAL IN NEBULIZER TWICE DAILY AS DIRECTED      PREVIDENT 5000 DRY MOUTH 1.1 % Gel       hydroCHLOROthiazide (HYDRODIURIL) 25 MG Tab Take 1 Tablet by mouth every day. 90 Tablet 3    albuterol 108 (90 Base) MCG/ACT Aero Soln inhalation aerosol Inhale 2 Puffs by mouth every 6 hours as needed for Shortness of Breath. 8.5 g 11     No facility-administered medications prior to visit.      '  Orders Only on 05/17/2023   Component Date Value    WBC 05/17/2023 4.1     RBC 05/17/2023 4.67     Hemoglobin 05/17/2023 15.3     Hematocrit 05/17/2023 44.3     MCV 05/17/2023 95     MCH 05/17/2023 32.8     MCHC 05/17/2023 34.5     RDW 05/17/2023 11.4 (L)     Platelet Count 05/17/2023 278     Neutrophils-Polys 05/17/2023 49     Lymphocytes 05/17/2023 36     Monocytes 05/17/2023 9     Eosinophils 05/17/2023 4     Basophils 05/17/2023 2     Immature Cells 05/17/2023 CANCELED     Neutrophils (Absolute) 05/17/2023 2.0     Lymphs (Absolute) 05/17/2023 1.5     Monos (Absolute) 05/17/2023 0.4     Eos (Absolute) 05/17/2023 0.2     Baso (Absolute) 05/17/2023 0.1     Immature Granulocytes 05/17/2023 0     Immature Granulocytes (a* 05/17/2023 0.0     Nucleated RBC 05/17/2023 CANCELED     Comments-Diff 05/17/2023 CANCELED     Glucose 05/17/2023 98     Bun 05/17/2023 22     Creatinine 05/17/2023 0.90     eGFR 05/17/2023 78     Bun-Creatinine Ratio 05/17/2023 24 (H)     Sodium 05/17/2023 140     Potassium 05/17/2023 3.8     Chloride 05/17/2023 101     Co2 05/17/2023 25     Calcium 05/17/2023 10.0     Total Protein 05/17/2023 7.4     Albumin 05/17/2023 5.3 (H)     Globulin 05/17/2023 2.1     A-G Ratio 05/17/2023 2.5 (H)     Total Bilirubin 05/17/2023 0.5     Alkaline Phosphatase 05/17/2023 63     AST(SGOT) 05/17/2023 20     ALT(SGPT) 05/17/2023 21     Cholesterol,Tot 05/17/2023 255 (H)     Triglycerides 05/17/2023 56     HDL 05/17/2023 83     VLDL Cholesterol Calc 05/17/2023 9     LDL Chol Calc (NIH) 05/17/2023 163 (H)     Comment: 05/17/2023 CANCELED     Glycohemoglobin 05/17/2023 5.6     TSH 05/17/2023 1.740     25-Hydroxy   Vitamin D 25 05/17/2023 30.0     C-Reactive Protein Cardi* 05/17/2023 1.07     Hepatitis C Antibody 05/17/2023 Non Reactive       Lab Results   Component Value Date/Time    HBA1C 5.6 05/17/2023 05:59 AM    HBA1C 5.6 01/24/2022 09:37 AM    HBA1C 5.3 03/15/2021 08:08 AM     Lab Results   Component Value  Date/Time    SODIUM 140 05/17/2023 05:59 AM    SODIUM 140 04/09/2022 11:22 AM    POTASSIUM 3.8 05/17/2023 05:59 AM    POTASSIUM 4.2 04/09/2022 11:22 AM    CHLORIDE 101 05/17/2023 05:59 AM    CHLORIDE 103 04/09/2022 11:22 AM    CO2 25 05/17/2023 05:59 AM    CO2 26 04/09/2022 11:22 AM    GLUCOSE 98 05/17/2023 05:59 AM    GLUCOSE 109 (H) 04/09/2022 11:22 AM    BUN 22 05/17/2023 05:59 AM    BUN 25 (H) 04/09/2022 11:22 AM    CREATININE 0.90 05/17/2023 05:59 AM    CREATININE 0.85 04/09/2022 11:22 AM    BUNCREATRAT 24 (H) 05/17/2023 05:59 AM    ALKPHOSPHAT 63 05/17/2023 05:59 AM    ALKPHOSPHAT 69 07/25/2022 09:05 AM    ASTSGOT 20 05/17/2023 05:59 AM    ASTSGOT 20 07/25/2022 09:05 AM    ALTSGPT 21 05/17/2023 05:59 AM    ALTSGPT 17 07/25/2022 09:05 AM    TBILIRUBIN 0.5 05/17/2023 05:59 AM    TBILIRUBIN 0.4 07/25/2022 09:05 AM     No results found for: INR  Lab Results   Component Value Date/Time    CHOLSTRLTOT 255 (H) 05/17/2023 05:59 AM    CHOLSTRLTOT 207 (H) 01/24/2022 09:37 AM     (H) 01/24/2022 09:37 AM    HDL 83 05/17/2023 05:59 AM    HDL 83 01/24/2022 09:37 AM    TRIGLYCERIDE 56 05/17/2023 05:59 AM    TRIGLYCERIDE 47 01/24/2022 09:37 AM       No results found for: TESTOSTERONE  Lab Results   Component Value Date/Time    TSH 1.740 05/17/2023 05:59 AM    TSH 1.490 06/19/2020 06:34 AM     No results found for: FREET4  No results found for: URICACID  No components found for: VITB12  Lab Results   Component Value Date/Time    25HYDROXY 30.0 05/17/2023 05:59 AM    25HYDROXY 38 01/24/2022 09:37 AM    25HYDROXY 50 03/15/2021 08:08 AM   '          HPI    Review of Systems   Constitutional: Negative.    HENT: Negative.     Eyes: Negative.    Respiratory: Negative.     Cardiovascular: Negative.    Gastrointestinal: Negative.    Genitourinary: Negative.    Musculoskeletal: Negative.    Skin: Negative.    Neurological: Negative.    Endo/Heme/Allergies: Negative.    Psychiatric/Behavioral: Negative.                Objective  "    /84 (BP Location: Left arm, Patient Position: Sitting, BP Cuff Size: Adult)   Pulse (!) 109   Temp 36.4 °C (97.6 °F) (Temporal)   Ht 1.702 m (5' 7\")   Wt 66.7 kg (147 lb)   LMP 10/08/2016   SpO2 98%   BMI 23.02 kg/m²      Physical Exam  Vitals reviewed.   Constitutional:       General: She is not in acute distress.     Appearance: She is well-developed. She is not diaphoretic.   HENT:      Head: Normocephalic and atraumatic.      Right Ear: External ear normal.      Left Ear: External ear normal.      Nose: Nose normal.      Mouth/Throat:      Pharynx: No oropharyngeal exudate.   Eyes:      General: No scleral icterus.        Right eye: No discharge.         Left eye: No discharge.      Conjunctiva/sclera: Conjunctivae normal.      Pupils: Pupils are equal, round, and reactive to light.   Neck:      Thyroid: No thyromegaly.      Vascular: No JVD.      Trachea: No tracheal deviation.   Cardiovascular:      Rate and Rhythm: Normal rate and regular rhythm.      Heart sounds: Normal heart sounds. No murmur heard.     No friction rub. No gallop.   Pulmonary:      Effort: Pulmonary effort is normal. No respiratory distress.      Breath sounds: Normal breath sounds. No stridor. No wheezing or rales.   Chest:      Chest wall: No tenderness.   Abdominal:      General: Bowel sounds are normal. There is no distension.      Palpations: Abdomen is soft. There is no mass.      Tenderness: There is no abdominal tenderness. There is no guarding or rebound.   Musculoskeletal:         General: No tenderness. Normal range of motion.      Cervical back: Normal range of motion and neck supple.   Lymphadenopathy:      Cervical: No cervical adenopathy.   Skin:     General: Skin is warm and dry.      Coloration: Skin is not pale.      Findings: No erythema or rash.   Neurological:      Mental Status: She is alert and oriented to person, place, and time.      Cranial Nerves: No cranial nerve deficit.      Motor: No abnormal " muscle tone.      Coordination: Coordination normal.      Deep Tendon Reflexes: Reflexes are normal and symmetric. Reflexes normal.   Psychiatric:         Behavior: Behavior normal.         Thought Content: Thought content normal.         Judgment: Judgment normal.                              Assessment & Plan        1. Dyslipidemia   .  Discussed.    diet/exercise/lose 15 lbs.; patient counseled    Mediterranean diet.     - CBC WITH DIFFERENTIAL; Future  - Comp Metabolic Panel; Future  - TSH; Future  - Lipid Profile; Future    2. HSV-1 (herpes simplex virus 1) infection  Good control continue current regimen  - acyclovir (ZOVIRAX) 400 MG tablet; Take 1 Tablet by mouth 3 times a day.  Dispense: 30 Tablet; Refill: 3    3. Post-menopause on HRT (hormone replacement therapy)  Good control continue current regimen  - Estradiol 0.025 MG/24HR PATCH BIWEEKLY; Place 1 Patch on the skin every 3 days.  Dispense: 8 Patch; Refill: 3    4. IFG (impaired fasting glucose)  Measuring diet and exercise  - HEMOGLOBIN A1C; Future    5. Vitamin D deficiency      Good control continue vitamin D3 2000 units daily.  - VITAMIN D,25 HYDROXY (DEFICIENCY); Future

## 2023-07-01 DIAGNOSIS — M62.830 MUSCLE SPASM OF BACK: ICD-10-CM

## 2023-07-03 RX ORDER — BACLOFEN 10 MG/1
TABLET ORAL
Qty: 90 TABLET | Refills: 0 | Status: SHIPPED | OUTPATIENT
Start: 2023-07-03 | End: 2023-07-31

## 2023-07-08 ENCOUNTER — OFFICE VISIT (OUTPATIENT)
Dept: URGENT CARE | Facility: CLINIC | Age: 53
End: 2023-07-08
Payer: COMMERCIAL

## 2023-07-08 VITALS
HEART RATE: 96 BPM | RESPIRATION RATE: 16 BRPM | HEIGHT: 67 IN | BODY MASS INDEX: 24.17 KG/M2 | OXYGEN SATURATION: 98 % | DIASTOLIC BLOOD PRESSURE: 92 MMHG | WEIGHT: 154 LBS | TEMPERATURE: 98.5 F | SYSTOLIC BLOOD PRESSURE: 138 MMHG

## 2023-07-08 DIAGNOSIS — B96.89 ACUTE BACTERIAL SINUSITIS: ICD-10-CM

## 2023-07-08 DIAGNOSIS — J01.90 ACUTE BACTERIAL SINUSITIS: ICD-10-CM

## 2023-07-08 DIAGNOSIS — J02.9 SORE THROAT: ICD-10-CM

## 2023-07-08 LAB — S PYO DNA SPEC NAA+PROBE: NOT DETECTED

## 2023-07-08 PROCEDURE — 3075F SYST BP GE 130 - 139MM HG: CPT | Performed by: PHYSICIAN ASSISTANT

## 2023-07-08 PROCEDURE — 3080F DIAST BP >= 90 MM HG: CPT | Performed by: PHYSICIAN ASSISTANT

## 2023-07-08 PROCEDURE — 99213 OFFICE O/P EST LOW 20 MIN: CPT | Performed by: PHYSICIAN ASSISTANT

## 2023-07-08 PROCEDURE — 87651 STREP A DNA AMP PROBE: CPT | Performed by: PHYSICIAN ASSISTANT

## 2023-07-08 RX ORDER — AMOXICILLIN AND CLAVULANATE POTASSIUM 875; 125 MG/1; MG/1
1 TABLET, FILM COATED ORAL 2 TIMES DAILY
Qty: 14 TABLET | Refills: 0 | Status: SHIPPED | OUTPATIENT
Start: 2023-07-08 | End: 2023-07-15

## 2023-07-08 ASSESSMENT — ENCOUNTER SYMPTOMS
EYE PAIN: 0
COUGH: 0
HEADACHES: 0
MYALGIAS: 0
CONSTIPATION: 0
DIARRHEA: 0
NAUSEA: 0
FEVER: 1
SINUS PAIN: 1
CHILLS: 0
SORE THROAT: 1
ABDOMINAL PAIN: 0
SHORTNESS OF BREATH: 0
VOMITING: 0

## 2023-07-08 ASSESSMENT — FIBROSIS 4 INDEX: FIB4 SCORE: 0.78

## 2023-07-08 NOTE — PROGRESS NOTES
"Subjective:   Odalys Ballard is a 52 y.o. female who presents for Pharyngitis (Sore throat. Fatigue. Slight fever, x days)      10 day history of runny nose cough, congestion. Has noted more sinus pressure and sore throat the last 2-3 days.  Has history of sinus infection in the past and this feels similar.    Review of Systems   Constitutional:  Positive for fever and malaise/fatigue. Negative for chills.   HENT:  Positive for congestion, sinus pain and sore throat. Negative for ear pain.    Eyes:  Negative for pain.   Respiratory:  Negative for cough and shortness of breath.    Cardiovascular:  Negative for chest pain.   Gastrointestinal:  Negative for abdominal pain, constipation, diarrhea, nausea and vomiting.   Genitourinary:  Negative for dysuria.   Musculoskeletal:  Negative for myalgias.   Skin:  Negative for rash.   Neurological:  Negative for headaches.       Medications, Allergies, and current problem list reviewed today in Epic.     Objective:     BP (!) 138/92 (BP Location: Right arm, Patient Position: Sitting, BP Cuff Size: Adult)   Pulse 96   Temp 36.9 °C (98.5 °F) (Temporal)   Resp 16   Ht 1.702 m (5' 7\")   Wt 69.9 kg (154 lb)   SpO2 98%     Physical Exam  Vitals reviewed.   Constitutional:       Appearance: Normal appearance.   HENT:      Head: Normocephalic and atraumatic.      Right Ear: Tympanic membrane, ear canal and external ear normal.      Left Ear: Tympanic membrane, ear canal and external ear normal.      Nose: Congestion and rhinorrhea present.      Mouth/Throat:      Mouth: Mucous membranes are moist.      Pharynx: No oropharyngeal exudate or posterior oropharyngeal erythema.      Comments: POST NASAL DRIP  Eyes:      Conjunctiva/sclera: Conjunctivae normal.   Cardiovascular:      Rate and Rhythm: Normal rate and regular rhythm.   Pulmonary:      Effort: Pulmonary effort is normal.      Breath sounds: Normal breath sounds.   Musculoskeletal:      Cervical back: Normal range of " motion.   Lymphadenopathy:      Cervical: No cervical adenopathy.   Skin:     General: Skin is warm and dry.      Capillary Refill: Capillary refill takes less than 2 seconds.   Neurological:      Mental Status: She is alert and oriented to person, place, and time.         Assessment/Plan:     Diagnosis and associated orders:     1. Sore throat  POCT Cepheid Group A Strep - PCR      2. Acute bacterial sinusitis  amoxicillin-clavulanate (AUGMENTIN) 875-125 MG Tab         Comments/MDM:     Strep testing negative  Patient meets IDSA guidelines for ABRS given duration of symptoms, worsening severity, clinical history and physical exam  Consider antihistamine, nasal steroid, anti-inflammatory, and saline rinses  No evidence of venous sinus thrombosis or more serious etiology  Typically these infections display a slow resolution of symptoms starting at 48-72 hours of treatment.  Mild pressure and pain may continue at end of week of antibiotics which is normal and continue the other supportive care until back to baseline.           Differential diagnosis, natural history, supportive care, and indications for immediate follow-up discussed.    Advised the patient to follow-up with the primary care physician for recheck, reevaluation, and consideration of further management.    Please note that this dictation was created using voice recognition software. I have made a reasonable attempt to correct obvious errors, but I expect that there are errors of grammar and possibly content that I did not discover before finalizing the note.    This note was electronically signed by Charlie Graham PA-C

## 2023-07-12 ENCOUNTER — OFFICE VISIT (OUTPATIENT)
Dept: URGENT CARE | Facility: CLINIC | Age: 53
End: 2023-07-12
Payer: COMMERCIAL

## 2023-07-12 VITALS
DIASTOLIC BLOOD PRESSURE: 88 MMHG | BODY MASS INDEX: 24.17 KG/M2 | WEIGHT: 154 LBS | HEIGHT: 67 IN | TEMPERATURE: 98.7 F | RESPIRATION RATE: 14 BRPM | HEART RATE: 88 BPM | SYSTOLIC BLOOD PRESSURE: 128 MMHG | OXYGEN SATURATION: 98 %

## 2023-07-12 DIAGNOSIS — J22 LOWER RESPIRATORY TRACT INFECTION: ICD-10-CM

## 2023-07-12 PROCEDURE — 99214 OFFICE O/P EST MOD 30 MIN: CPT | Performed by: REGISTERED NURSE

## 2023-07-12 PROCEDURE — 3079F DIAST BP 80-89 MM HG: CPT | Performed by: REGISTERED NURSE

## 2023-07-12 PROCEDURE — 3074F SYST BP LT 130 MM HG: CPT | Performed by: REGISTERED NURSE

## 2023-07-12 RX ORDER — AZITHROMYCIN 250 MG/1
TABLET, FILM COATED ORAL
Qty: 6 TABLET | Refills: 0 | Status: SHIPPED | OUTPATIENT
Start: 2023-07-12 | End: 2023-10-15

## 2023-07-12 ASSESSMENT — FIBROSIS 4 INDEX: FIB4 SCORE: 0.78

## 2023-07-12 ASSESSMENT — ENCOUNTER SYMPTOMS
TINGLING: 0
EYE DISCHARGE: 0
SHORTNESS OF BREATH: 0
WEAKNESS: 0
HEADACHES: 0
SPUTUM PRODUCTION: 1
SENSORY CHANGE: 0
SORE THROAT: 0
VOMITING: 0
DIZZINESS: 0
NECK PAIN: 0
DIARRHEA: 0
NAUSEA: 0
EYE PAIN: 0
PALPITATIONS: 0
COUGH: 1
FEVER: 0
ABDOMINAL PAIN: 0

## 2023-07-12 NOTE — PROGRESS NOTES
Subjective:   Odalys Ballard is a 52 y.o. female who presents for URI (Pt states was last seen 7/8/23, pt request switch rx, chest congestion, symptoms not improving )    HPI  Was diagnosed with sinusitis and started on Augmentin on 7/8/23. She is here for reevaluation and states her symptoms are not improving and would like to switch antibiotic because she is now coughing up copious amounts of mucous that is dark green and brown. Hx of asthma which she uses albuterol inhaler. No recent hospitalizations. Immunizations current. Using OTC cold and cough medication.    Review of Systems   Constitutional:  Positive for malaise/fatigue. Negative for fever.   HENT:  Positive for congestion. Negative for ear discharge, ear pain, hearing loss and sore throat.    Eyes:  Negative for pain and discharge.   Respiratory:  Positive for cough and sputum production. Negative for shortness of breath.    Cardiovascular:  Negative for chest pain, palpitations and leg swelling.   Gastrointestinal:  Negative for abdominal pain, diarrhea, nausea and vomiting.   Musculoskeletal:  Negative for neck pain.   Skin:  Negative for rash.   Neurological:  Negative for dizziness, tingling, sensory change, weakness and headaches.       Allergies   Allergen Reactions    Lisinopril Cough    Losartan Cough     Per pt reports that she had bad congestion        Patient Active Problem List    Diagnosis Date Noted    Eosinophilic esophagitis 05/18/2023    Chronic low back pain with sciatica 02/09/2023    Fracture of multiple ribs of left side with routine healing 08/20/2022    Dissection of vertebral artery (HCC)- left on CTA 6/2021- Dr. Saldana 03/03/2022    S/P bilateral mastectomy 05/04/2021    Cervical radiculopathy at C6 10/01/2020    Essential hypertension 10/01/2020    IFG (impaired fasting glucose) 10/01/2020    Vitamin D deficiency 10/01/2020    Dyslipidemia 10/01/2020    Primary insomnia 10/01/2020    Mild persistent asthma without complication  06/15/2020    Family history of breast cancer gene mutation in first degree relative 06/15/2020    Migraine without aura and with status migrainosus, not intractable 06/15/2020       Current Outpatient Medications Ordered in Epic   Medication Sig Dispense Refill    azithromycin (ZITHROMAX) 250 MG Tab Take 2 tabs PO on day 1, take 1 tab PO day 2-5 6 Tablet 0    amoxicillin-clavulanate (AUGMENTIN) 875-125 MG Tab Take 1 Tablet by mouth 2 times a day for 7 days. 14 Tablet 0    baclofen (LIORESAL) 10 MG Tab Take 1 tablet by mouth three times daily as needed for muscle spasm 90 Tablet 0    acyclovir (ZOVIRAX) 400 MG tablet Take 1 Tablet by mouth 3 times a day. 30 Tablet 3    Estradiol 0.025 MG/24HR PATCH BIWEEKLY Place 1 Patch on the skin every 3 days. 8 Patch 3    Cholecalciferol (VITAMIN D) 50 MCG (2000 UT) Cap Take 1 Capsule by mouth every day. 100 Capsule 3    rizatriptan (MAXALT) 10 MG tablet TAKE 1 TABLET BY MOUTH ONCE DAILY AS NEEDED FOR MIGRAINE HEADACHE 12 Tablet 5    gabapentin (NEURONTIN) 100 MG Cap Take 1 Capsule by mouth 3 times a day. 90 Capsule 3    celecoxib (CELEBREX) 200 MG Cap Take 1 Capsule by mouth 2 times a day. 60 Capsule 5    budesonide (PULMICORT) 0.5 MG/2ML Suspension USE 1 VIAL IN NEBULIZER TWICE DAILY AS DIRECTED      PREVIDENT 5000 DRY MOUTH 1.1 % Gel       hydroCHLOROthiazide (HYDRODIURIL) 25 MG Tab Take 1 Tablet by mouth every day. 90 Tablet 3    albuterol 108 (90 Base) MCG/ACT Aero Soln inhalation aerosol Inhale 2 Puffs by mouth every 6 hours as needed for Shortness of Breath. 8.5 g 11     No current [facility]-ordered facility-administered medications on file.       Past Surgical History:   Procedure Laterality Date    BREAST RECONSTRUCTION Bilateral 2/11/2021    Procedure: RECONSTRUCTION, BREAST - AND REVISION WITH DERMAL GLANDULAR FLAPS, CAPSULECTOMY;  Surgeon: Amos Dang M.D.;  Location: SURGERY SAME DAY [facility];  Service: Plastics    EXCISION,FAT GRAFT  2/11/2021    Procedure:  EXCISION, FAT GRAFT- FOR FAT GRAFTING;  Surgeon: Amos Dang M.D.;  Location: SURGERY SAME DAY [facility];  Service: Plastics    TISSUE EXPANDER PLACE/REMOVE Bilateral 2/11/2021    Procedure: INSERTION OR REMOVAL, TISSUE EXPANDER;  Surgeon: Amos Dang M.D.;  Location: SURGERY SAME DAY [facility];  Service: Plastics    BREAST IMPLANT REVISION Bilateral 2/11/2021    Procedure: INSERTION, IMPLANT, BREAST;  Surgeon: Amos Dang M.D.;  Location: SURGERY SAME DAY [facility];  Service: Plastics    LIPOSUCTION  2/11/2021    Procedure: LIPOSUCTION-ABDOMEN, FLANKS, HIPS OUTTER THIGHS, AND UPPER BACK;  Surgeon: Amos Dang M.D.;  Location: SURGERY SAME DAY [facility];  Service: Plastics    PB MASTECTOMY, SIMPLE, COMPLETE Bilateral 11/5/2020    Procedure: MASTECTOMY;  Surgeon: Franny Felix M.D.;  Location: [facility];  Service: General    BREAST RECONSTRUCTION Bilateral 11/5/2020    Procedure: RECONSTRUCTION, BREAST;  Surgeon: Amos Dang M.D.;  Location: [facility];  Service: Plastics    TISSUE EXPANDER PLACE/REMOVE Bilateral 11/5/2020    Procedure: TISSUE EXPANDER- FOR PLACEMENT  AND USE OF ACELLULAR DERMAL MATRIX;  Surgeon: Amos Dang M.D.;  Location: [facility];  Service: Plastics    HYSTERECTOMY ROBOTIC N/A 10/17/2016    Procedure: HYSTERECTOMY ROBOTIC, BILATERAL SALPINGECTOMY;  Surgeon: Yamilte Trammell M.D.;  Location: [facility];  Service:     CYSTOSCOPY N/A 10/17/2016    Procedure: CYSTOSCOPY;  Surgeon: Yamilet Trammell M.D.;  Location: [facility];  Service:     PELVISCOPY  9/6/2013    Performed by Spencer Matthews M.D. at [facility]    CHOLECYSTECTOMY  2009    laparoscopic    LAPAROSCOPY  2000, 2008       Social History     Tobacco Use    Smoking status: Never    Smokeless tobacco: Never   Vaping Use    Vaping Use: Never used   Substance Use Topics    Alcohol use: Not Currently     Comment: 2 per week     "Drug use: No       family history includes Cancer in an other family member; Diabetes in an other family member; Heart Disease in an other family member; Hypertension in an other family member.     Problem list, medications, and allergies reviewed by myself today in Epic.     Objective:   /88 (BP Location: Left arm, Patient Position: Sitting, BP Cuff Size: Adult)   Pulse 88   Temp 37.1 °C (98.7 °F) (Temporal)   Resp 14   Ht 1.702 m (5' 7\")   Wt 69.9 kg (154 lb)   LMP 10/08/2016   SpO2 98%   BMI 24.12 kg/m²     Physical Exam  Vitals and nursing note reviewed.   Constitutional:       General: She is not in acute distress.     Appearance: Normal appearance. She is well-developed. She is not ill-appearing, toxic-appearing or diaphoretic.   HENT:      Head: Normocephalic and atraumatic.      Right Ear: Tympanic membrane, ear canal and external ear normal.      Left Ear: Tympanic membrane, ear canal and external ear normal.      Nose: Nose normal. No congestion or rhinorrhea.      Mouth/Throat:      Mouth: Mucous membranes are moist.      Pharynx: No oropharyngeal exudate or posterior oropharyngeal erythema.   Eyes:      General:         Right eye: No discharge.         Left eye: No discharge.      Conjunctiva/sclera: Conjunctivae normal.   Cardiovascular:      Rate and Rhythm: Normal rate and regular rhythm.      Pulses: Normal pulses.      Heart sounds: Normal heart sounds.   Pulmonary:      Effort: Pulmonary effort is normal. No respiratory distress.      Breath sounds: Normal breath sounds. No wheezing, rhonchi or rales.   Musculoskeletal:      Cervical back: Normal range of motion and neck supple.      Right lower leg: No edema.      Left lower leg: No edema.   Lymphadenopathy:      Cervical: No cervical adenopathy.   Skin:     General: Skin is warm and dry.   Neurological:      General: No focal deficit present.      Mental Status: She is alert and oriented to person, place, and time. Mental status is " at baseline.   Psychiatric:         Mood and Affect: Mood normal.         Behavior: Behavior normal.         Thought Content: Thought content normal.         Judgment: Judgment normal.         Assessment/Plan:     Diagnosis and associated orders:   1. Lower respiratory tract infection  azithromycin (ZITHROMAX) 250 MG Tab         Comments/MDM:     Vital signs WNL, non toxic appearance, no red flag signs or symptoms  Overall benign exam, but she is having a harsh congested cough t/o exam. There iare no adventitious lung sounds or increased WOB.  I am concerned that her lung symptoms are worsening despite treatment with Augmentin, given her underlying hx of asthma.   I will provided azithromycin for bacterial lower respiratory tract infection. Discussed the importance of deep breathing and coughing, adequate hydration and frequent ambulation.   Start using albuterol every 4 hours  Follow up with primary care provider          Differential diagnosis, natural history, supportive care, and indications for immediate follow-up discussed.    Return to clinic or go to ED if symptoms worsen or persist. Indications for ED discussed at length. Patient/Parent/Guardian voices understanding. Follow-up with your primary care provider in 3-5 days. Red flag symptoms discussed. All side effects of medication discussed including allergic response, GI upset, tendon injury, rash, sedation etc.    I personally reviewed prior external notes and test results pertinent to today's visit as well as additional imaging and testing completed in clinic today.     Please note that this dictation was created using voice recognition software. I have made every reasonable attempt to correct obvious errors, but I expect that there are errors of grammar and possibly content that I did not discover before finalizing the note.    This note was electronically signed by SHONNA Cadena

## 2023-07-17 ENCOUNTER — PATIENT MESSAGE (OUTPATIENT)
Dept: SLEEP MEDICINE | Facility: MEDICAL CENTER | Age: 53
End: 2023-07-17
Payer: COMMERCIAL

## 2023-07-30 DIAGNOSIS — F51.01 PRIMARY INSOMNIA: ICD-10-CM

## 2023-07-30 DIAGNOSIS — M62.830 MUSCLE SPASM OF BACK: ICD-10-CM

## 2023-07-31 RX ORDER — BACLOFEN 10 MG/1
TABLET ORAL
Qty: 90 TABLET | Refills: 0 | Status: SHIPPED | OUTPATIENT
Start: 2023-07-31 | End: 2023-09-05

## 2023-07-31 RX ORDER — ESZOPICLONE 3 MG/1
TABLET, FILM COATED ORAL
Qty: 90 TABLET | Refills: 0 | Status: SHIPPED | OUTPATIENT
Start: 2023-07-31 | End: 2023-12-11

## 2023-08-28 ENCOUNTER — APPOINTMENT (OUTPATIENT)
Dept: RADIOLOGY | Facility: MEDICAL CENTER | Age: 53
End: 2023-08-28
Attending: SURGERY
Payer: COMMERCIAL

## 2023-08-28 DIAGNOSIS — I77.74 DISSECTION OF VERTEBRAL ARTERY (HCC): ICD-10-CM

## 2023-08-28 PROCEDURE — 93880 EXTRACRANIAL BILAT STUDY: CPT

## 2023-09-05 DIAGNOSIS — M62.830 MUSCLE SPASM OF BACK: ICD-10-CM

## 2023-09-05 RX ORDER — BACLOFEN 10 MG/1
TABLET ORAL
Qty: 100 TABLET | Refills: 0 | Status: SHIPPED | OUTPATIENT
Start: 2023-09-05 | End: 2023-10-16

## 2023-09-08 NOTE — PROGRESS NOTES
Pt requested RSV vaccine  Not available on electronic system to order  Will write written prescription  Informed pt

## 2023-10-11 ENCOUNTER — APPOINTMENT (OUTPATIENT)
Dept: SLEEP MEDICINE | Facility: MEDICAL CENTER | Age: 53
End: 2023-10-11
Attending: INTERNAL MEDICINE
Payer: COMMERCIAL

## 2023-10-13 ENCOUNTER — PHARMACY VISIT (OUTPATIENT)
Dept: PHARMACY | Facility: MEDICAL CENTER | Age: 53
End: 2023-10-13
Payer: COMMERCIAL

## 2023-10-13 ENCOUNTER — OFFICE VISIT (OUTPATIENT)
Dept: SLEEP MEDICINE | Facility: MEDICAL CENTER | Age: 53
End: 2023-10-13
Attending: INTERNAL MEDICINE
Payer: COMMERCIAL

## 2023-10-13 VITALS
BODY MASS INDEX: 24.8 KG/M2 | SYSTOLIC BLOOD PRESSURE: 126 MMHG | HEIGHT: 67 IN | OXYGEN SATURATION: 95 % | WEIGHT: 158 LBS | HEART RATE: 98 BPM | DIASTOLIC BLOOD PRESSURE: 84 MMHG

## 2023-10-13 DIAGNOSIS — T28.0XXA: ICD-10-CM

## 2023-10-13 DIAGNOSIS — B99.9 RECURRENT INFECTIONS: ICD-10-CM

## 2023-10-13 DIAGNOSIS — J45.30 MILD PERSISTENT ASTHMA WITHOUT COMPLICATION: ICD-10-CM

## 2023-10-13 PROCEDURE — RXMED WILLOW AMBULATORY MEDICATION CHARGE: Performed by: INTERNAL MEDICINE

## 2023-10-13 PROCEDURE — 99214 OFFICE O/P EST MOD 30 MIN: CPT | Performed by: INTERNAL MEDICINE

## 2023-10-13 PROCEDURE — 3079F DIAST BP 80-89 MM HG: CPT | Performed by: INTERNAL MEDICINE

## 2023-10-13 PROCEDURE — 3074F SYST BP LT 130 MM HG: CPT | Performed by: INTERNAL MEDICINE

## 2023-10-13 PROCEDURE — 99213 OFFICE O/P EST LOW 20 MIN: CPT | Performed by: INTERNAL MEDICINE

## 2023-10-13 RX ORDER — RESPIRATORY SYNCYTIAL VIRUS VACCINE 120MCG/0.5
0.5 KIT INTRAMUSCULAR
Qty: 0.5 ML | Refills: 0 | Status: SHIPPED | OUTPATIENT
Start: 2023-10-13 | End: 2023-12-14

## 2023-10-13 RX ORDER — INFLUENZA A VIRUS A/BRISBANE/02/2018 IVR-190 (H1N1) ANTIGEN (FORMALDEHYDE INACTIVATED), INFLUENZA A VIRUS A/KANSAS/14/2017 X-327 (H3N2) ANTIGEN (FORMALDEHYDE INACTIVATED), INFLUENZA B VIRUS B/PHUKET/3073/2013 ANTIGEN (FORMALDEHYDE INACTIVATED), AND INFLUENZA B VIRUS B/MARYLAND/15/2016 BX-69A ANTIGEN (FORMALDEHYDE INACTIVATED) 15; 15; 15; 15 UG/.5ML; UG/.5ML; UG/.5ML; UG/.5ML
0.5 INJECTION, SUSPENSION INTRAMUSCULAR
Qty: 0.5 ML | Refills: 0 | Status: SHIPPED | OUTPATIENT
Start: 2023-10-13 | End: 2023-12-14

## 2023-10-13 RX ORDER — ALBUTEROL SULFATE 90 UG/1
2 AEROSOL, METERED RESPIRATORY (INHALATION) EVERY 6 HOURS PRN
Qty: 8.5 G | Refills: 11 | Status: SHIPPED | OUTPATIENT
Start: 2023-10-13

## 2023-10-13 RX ORDER — ASPIRIN 81 MG/1
TABLET, CHEWABLE ORAL
COMMUNITY
Start: 2023-10-03 | End: 2024-03-08

## 2023-10-13 ASSESSMENT — FIBROSIS 4 INDEX: FIB4 SCORE: 0.8

## 2023-10-13 NOTE — PROGRESS NOTES
Pulmonary Clinic follow up    Date of Service: 10/13/2023    Reason for follow up:  Asthma (Last seen 08/19/22) and Medication Management (Trial : [facility])      Problem List Items Addressed This Visit       Mild persistent asthma without complication     This is likely the cause of her phlegm  Reviewed pathophysiology of asthma  Reviewed smooth muscle wall changes and that it can become irreversible if not treated  Reviewed inhaled corticosteroids per the SURI guidelines can be prn and not daily  Flutter valve tid  This will decrease phlegm and hopefully her nidus for infection         Relevant Medications    albuterol 108 (90 Base) MCG/ACT Aero Soln inhalation aerosol    Other Relevant Orders    IMMUNOGLOBULINS A/G/M SERUM    DME Other    Recurrent infections     Respiratory  Immunizations discussed  As pt had a very difficult experience with RSV - written for RSV Vaccine  Check immunoglobulins         Burn of pharynx     Unclear etiology  Referral to ENT         Relevant Orders    Referral to ENT         History of Present Illness: Odalys Ballard is a 53 y.o. female with a past medical history of moderate persistent asthma that markedly worsened after RSV in 3/2022 where she required hospitalization.  Her cough was makayla severe that she had two rib fractures  Her wheezing and cough lasted almost 8 weeks  She then developed dysphagia and saw GI  Found to have small hiatal hernia and bx of the esophagus was concerning for eosinophilic esophagitis\  I last saw patient on 8/19/2022    Since then she says her eosinophilic esophagitis is better with the immunotherapy for allergies  She has figured out some of the foods that exacerbate her symptoms which include strawberries, dairy and gluten  She is following up with Dr. Mills closely    She still has burning sensation in the right post pharynx that she is not clear what this is  She had a bx done of her ethmoid sinus which showed chronic inflammation  Unsure why she had  a bx of the sinus    Regarding her exercise she does use albuterol before exercising  She is concerned re: any inhaled steroids makes her put on weight  She struggles to cough up the phlegm and sometimes gags on the mucous    She has started estrogen supplements to help with menopausal symptoms    She has had 3-4 infections since we last saw each other        Review of Systems   Constitutional: Negative.    HENT: Negative.          Burn in pharynx   Eyes: Negative.    Respiratory:  Positive for cough and shortness of breath.    Cardiovascular:  Positive for leg swelling.   Gastrointestinal: Negative.    Genitourinary: Negative.    Musculoskeletal: Negative.    Skin: Negative.    Neurological: Negative.    Endo/Heme/Allergies: Negative.    Psychiatric/Behavioral: Negative.         Current Outpatient Medications on File Prior to Visit   Medication Sig Dispense Refill    aspirin (ASA) 81 MG Chew Tab chewable tablet       baclofen (LIORESAL) 10 MG Tab Take 1 tablet by mouth three times daily as needed for muscle spasm 100 Tablet 0    Eszopiclone 3 MG Tab Take 1 tablet by mouth in the evening 90 Tablet 0    Estradiol 0.025 MG/24HR PATCH BIWEEKLY Place 1 Patch on the skin every 3 days. 8 Patch 3    rizatriptan (MAXALT) 10 MG tablet TAKE 1 TABLET BY MOUTH ONCE DAILY AS NEEDED FOR MIGRAINE HEADACHE 12 Tablet 5    gabapentin (NEURONTIN) 100 MG Cap Take 1 Capsule by mouth 3 times a day. 90 Capsule 3    hydroCHLOROthiazide (HYDRODIURIL) 25 MG Tab Take 1 Tablet by mouth every day. 90 Tablet 3     No current facility-administered medications on file prior to visit.       Social History     Tobacco Use    Smoking status: Never    Smokeless tobacco: Never   Vaping Use    Vaping Use: Never used   Substance Use Topics    Alcohol use: Not Currently     Comment: 2 per week    Drug use: No        Past Medical History:   Diagnosis Date    Anesthesia     PONV     ASTHMA     prn inhaler    At high risk for breast cancer- prophylactic  bilateral mastectomy TBD November 2020 10/1/2020    Bronchitis 2015    Excessive sodium intake 10/1/2020    Heart burn     Hiatus hernia syndrome     Hypertension     Other specified symptom associated with female genital organs     endometriosis, fibroids    Pneumonia 2010    RSV (respiratory syncytial virus infection) 3/27/2022    Shortness of breath 3/27/2022    White coat syndrome with diagnosis of hypertension 6/15/2020       Past Surgical History:   Procedure Laterality Date    BREAST RECONSTRUCTION Bilateral 2/11/2021    Procedure: RECONSTRUCTION, BREAST - AND REVISION WITH DERMAL GLANDULAR FLAPS, CAPSULECTOMY;  Surgeon: Amos Dang M.D.;  Location: SURGERY SAME DAY [facility];  Service: Plastics    EXCISION,FAT GRAFT  2/11/2021    Procedure: EXCISION, FAT GRAFT- FOR FAT GRAFTING;  Surgeon: Amos Dang M.D.;  Location: SURGERY SAME DAY [facility];  Service: Plastics    TISSUE EXPANDER PLACE/REMOVE Bilateral 2/11/2021    Procedure: INSERTION OR REMOVAL, TISSUE EXPANDER;  Surgeon: Amos Dang M.D.;  Location: SURGERY SAME DAY [facility];  Service: Plastics    BREAST IMPLANT REVISION Bilateral 2/11/2021    Procedure: INSERTION, IMPLANT, BREAST;  Surgeon: Amos Dang M.D.;  Location: SURGERY SAME DAY [facility];  Service: Plastics    LIPOSUCTION  2/11/2021    Procedure: LIPOSUCTION-ABDOMEN, FLANKS, HIPS OUTTER THIGHS, AND UPPER BACK;  Surgeon: Amos Dang M.D.;  Location: SURGERY SAME DAY [facility];  Service: Plastics    PB MASTECTOMY, SIMPLE, COMPLETE Bilateral 11/5/2020    Procedure: MASTECTOMY;  Surgeon: Franny Felix M.D.;  [place];  Service: General    BREAST RECONSTRUCTION Bilateral 11/5/2020    Procedure: RECONSTRUCTION, BREAST;  Surgeon: Amos Dang M.D.;  [place];  Service: Plastics    TISSUE EXPANDER PLACE/REMOVE Bilateral 11/5/2020    Procedure: TISSUE EXPANDER- FOR PLACEMENT  AND USE OF ACELLULAR DERMAL MATRIX;  Surgeon:  "Amos Dang M.D.;  Location: SURGERY [facility];  Service: Plastics    HYSTERECTOMY ROBOTIC N/A 10/17/2016    Procedure: HYSTERECTOMY ROBOTIC, BILATERAL SALPINGECTOMY;  Surgeon: Yamilet Trammell M.D.;  Location: SURGERY [facility];  Service:     CYSTOSCOPY N/A 10/17/2016    Procedure: CYSTOSCOPY;  Surgeon: Yamilet Trammell M.D.;  Location: SURGERY [facility];  Service:     PELVISCOPY  9/6/2013    Performed by Spencer Matthews M.D. at SURGERY [facility]    CHOLECYSTECTOMY  2009    laparoscopic    LAPAROSCOPY  2000, 2008       Allergies: Lisinopril and Losartan    Family History   Problem Relation Age of Onset    Diabetes Other     Heart Disease Other     Hypertension Other     Cancer Other        Vitals:    10/13/23 1259   Height: 1.702 m (5' 7\")   Weight: 71.7 kg (158 lb)   Weight % change since last entry.: 0 %   BP: 126/84   Pulse: 98   BMI (Calculated): 24.75       Physical Examination  Physical Exam  Constitutional:       Appearance: Normal appearance.   HENT:      Head: Normocephalic and atraumatic.      Mouth/Throat:      Mouth: Mucous membranes are moist.   Eyes:      Extraocular Movements: Extraocular movements intact.      Pupils: Pupils are equal, round, and reactive to light.   Cardiovascular:      Rate and Rhythm: Normal rate.   Pulmonary:      Effort: Pulmonary effort is normal.      Comments: Left insp squeaks post lung  Musculoskeletal:      Right lower leg: No edema.      Left lower leg: No edema.      Comments: Saw pics of pt lateral thigh difference  L > R   Neurological:      General: No focal deficit present.      Mental Status: She is alert and oriented to person, place, and time.   Psychiatric:         Mood and Affect: Mood normal.         Behavior: Behavior normal.         Thought Content: Thought content normal.         Judgment: Judgment normal.              Tyler Henriquez M.D., MD MPH [facility]  [facility]  "

## 2023-10-15 PROBLEM — B99.9 RECURRENT INFECTIONS: Status: ACTIVE | Noted: 2023-10-15

## 2023-10-15 PROBLEM — T28.0XXA: Status: ACTIVE | Noted: 2023-10-15

## 2023-10-15 ASSESSMENT — ENCOUNTER SYMPTOMS
COUGH: 1
PSYCHIATRIC NEGATIVE: 1
EYES NEGATIVE: 1
CONSTITUTIONAL NEGATIVE: 1
NEUROLOGICAL NEGATIVE: 1
MUSCULOSKELETAL NEGATIVE: 1
GASTROINTESTINAL NEGATIVE: 1
SHORTNESS OF BREATH: 1

## 2023-10-15 NOTE — ASSESSMENT & PLAN NOTE
Respiratory  Immunizations discussed  As pt had a very difficult experience with RSV - written for RSV Vaccine  Check immunoglobulins

## 2023-10-15 NOTE — ASSESSMENT & PLAN NOTE
This is likely the cause of her phlegm  Reviewed pathophysiology of asthma  Reviewed smooth muscle wall changes and that it can become irreversible if not treated  Reviewed inhaled corticosteroids per the SURI guidelines can be prn and not daily  Flutter valve tid  This will decrease phlegm and hopefully her nidus for infection

## 2023-10-16 DIAGNOSIS — M62.830 MUSCLE SPASM OF BACK: ICD-10-CM

## 2023-10-16 RX ORDER — BACLOFEN 10 MG/1
TABLET ORAL
Qty: 100 TABLET | Refills: 0 | Status: SHIPPED | OUTPATIENT
Start: 2023-10-16 | End: 2023-11-27

## 2023-11-02 ENCOUNTER — HOSPITAL ENCOUNTER (OUTPATIENT)
Dept: LAB | Facility: MEDICAL CENTER | Age: 53
End: 2023-11-02
Attending: INTERNAL MEDICINE
Payer: COMMERCIAL

## 2023-11-02 DIAGNOSIS — E55.9 VITAMIN D DEFICIENCY: ICD-10-CM

## 2023-11-02 DIAGNOSIS — R73.01 IFG (IMPAIRED FASTING GLUCOSE): ICD-10-CM

## 2023-11-02 DIAGNOSIS — J45.30 MILD PERSISTENT ASTHMA WITHOUT COMPLICATION: ICD-10-CM

## 2023-11-02 DIAGNOSIS — E78.5 DYSLIPIDEMIA: ICD-10-CM

## 2023-11-02 LAB
25(OH)D3 SERPL-MCNC: 37 NG/ML (ref 30–100)
ALBUMIN SERPL BCP-MCNC: 5 G/DL (ref 3.2–4.9)
ALBUMIN/GLOB SERPL: 2.2 G/DL
ALP SERPL-CCNC: 61 U/L (ref 30–99)
ALT SERPL-CCNC: 20 U/L (ref 2–50)
ANION GAP SERPL CALC-SCNC: 11 MMOL/L (ref 7–16)
AST SERPL-CCNC: 18 U/L (ref 12–45)
BASOPHILS # BLD AUTO: 0.7 % (ref 0–1.8)
BASOPHILS # BLD: 0.04 K/UL (ref 0–0.12)
BILIRUB SERPL-MCNC: 0.7 MG/DL (ref 0.1–1.5)
BUN SERPL-MCNC: 24 MG/DL (ref 8–22)
CALCIUM ALBUM COR SERPL-MCNC: 8.7 MG/DL (ref 8.5–10.5)
CALCIUM SERPL-MCNC: 9.5 MG/DL (ref 8.5–10.5)
CHLORIDE SERPL-SCNC: 102 MMOL/L (ref 96–112)
CHOLEST SERPL-MCNC: 248 MG/DL (ref 100–199)
CO2 SERPL-SCNC: 28 MMOL/L (ref 20–33)
CREAT SERPL-MCNC: 0.77 MG/DL (ref 0.5–1.4)
EOSINOPHIL # BLD AUTO: 0.24 K/UL (ref 0–0.51)
EOSINOPHIL NFR BLD: 4.5 % (ref 0–6.9)
ERYTHROCYTE [DISTWIDTH] IN BLOOD BY AUTOMATED COUNT: 40.9 FL (ref 35.9–50)
EST. AVERAGE GLUCOSE BLD GHB EST-MCNC: 103 MG/DL
FASTING STATUS PATIENT QL REPORTED: NORMAL
GFR SERPLBLD CREATININE-BSD FMLA CKD-EPI: 93 ML/MIN/1.73 M 2
GLOBULIN SER CALC-MCNC: 2.3 G/DL (ref 1.9–3.5)
GLUCOSE SERPL-MCNC: 109 MG/DL (ref 65–99)
HBA1C MFR BLD: 5.2 % (ref 4–5.6)
HCT VFR BLD AUTO: 46.1 % (ref 37–47)
HDLC SERPL-MCNC: 91 MG/DL
HGB BLD-MCNC: 15.5 G/DL (ref 12–16)
IMM GRANULOCYTES # BLD AUTO: 0.02 K/UL (ref 0–0.11)
IMM GRANULOCYTES NFR BLD AUTO: 0.4 % (ref 0–0.9)
LDLC SERPL CALC-MCNC: 149 MG/DL
LYMPHOCYTES # BLD AUTO: 1.26 K/UL (ref 1–4.8)
LYMPHOCYTES NFR BLD: 23.6 % (ref 22–41)
MCH RBC QN AUTO: 32.5 PG (ref 27–33)
MCHC RBC AUTO-ENTMCNC: 33.6 G/DL (ref 32.2–35.5)
MCV RBC AUTO: 96.6 FL (ref 81.4–97.8)
MONOCYTES # BLD AUTO: 0.49 K/UL (ref 0–0.85)
MONOCYTES NFR BLD AUTO: 9.2 % (ref 0–13.4)
NEUTROPHILS # BLD AUTO: 3.29 K/UL (ref 1.82–7.42)
NEUTROPHILS NFR BLD: 61.6 % (ref 44–72)
NRBC # BLD AUTO: 0 K/UL
NRBC BLD-RTO: 0 /100 WBC (ref 0–0.2)
PLATELET # BLD AUTO: 267 K/UL (ref 164–446)
PMV BLD AUTO: 10.2 FL (ref 9–12.9)
POTASSIUM SERPL-SCNC: 4.1 MMOL/L (ref 3.6–5.5)
PROT SERPL-MCNC: 7.3 G/DL (ref 6–8.2)
RBC # BLD AUTO: 4.77 M/UL (ref 4.2–5.4)
SODIUM SERPL-SCNC: 141 MMOL/L (ref 135–145)
TRIGL SERPL-MCNC: 42 MG/DL (ref 0–149)
TSH SERPL DL<=0.005 MIU/L-ACNC: 1.31 UIU/ML (ref 0.38–5.33)
WBC # BLD AUTO: 5.3 K/UL (ref 4.8–10.8)

## 2023-11-02 PROCEDURE — 82306 VITAMIN D 25 HYDROXY: CPT

## 2023-11-02 PROCEDURE — 82784 ASSAY IGA/IGD/IGG/IGM EACH: CPT

## 2023-11-02 PROCEDURE — 36415 COLL VENOUS BLD VENIPUNCTURE: CPT

## 2023-11-02 PROCEDURE — 80053 COMPREHEN METABOLIC PANEL: CPT

## 2023-11-02 PROCEDURE — 84443 ASSAY THYROID STIM HORMONE: CPT

## 2023-11-02 PROCEDURE — 80061 LIPID PANEL: CPT

## 2023-11-02 PROCEDURE — 85025 COMPLETE CBC W/AUTO DIFF WBC: CPT

## 2023-11-02 PROCEDURE — 83036 HEMOGLOBIN GLYCOSYLATED A1C: CPT

## 2023-11-05 LAB
IGA SERPL-MCNC: 123 MG/DL (ref 68–408)
IGG SERPL-MCNC: 755 MG/DL (ref 768–1632)
IGM SERPL-MCNC: 114 MG/DL (ref 35–263)

## 2023-11-07 ENCOUNTER — APPOINTMENT (OUTPATIENT)
Dept: MEDICAL GROUP | Age: 53
End: 2023-11-07
Payer: COMMERCIAL

## 2023-11-07 DIAGNOSIS — I10 ESSENTIAL HYPERTENSION: ICD-10-CM

## 2023-11-07 RX ORDER — HYDROCHLOROTHIAZIDE 25 MG/1
25 TABLET ORAL DAILY
Qty: 100 TABLET | Refills: 0 | Status: SHIPPED | OUTPATIENT
Start: 2023-11-07 | End: 2023-12-14 | Stop reason: SDUPTHER

## 2023-11-24 DIAGNOSIS — M62.830 MUSCLE SPASM OF BACK: ICD-10-CM

## 2023-11-27 RX ORDER — BACLOFEN 10 MG/1
TABLET ORAL
Qty: 100 TABLET | Refills: 0 | Status: SHIPPED | OUTPATIENT
Start: 2023-11-27 | End: 2024-01-08

## 2023-12-11 DIAGNOSIS — F51.01 PRIMARY INSOMNIA: ICD-10-CM

## 2023-12-11 RX ORDER — ESZOPICLONE 3 MG/1
TABLET, FILM COATED ORAL
Qty: 90 TABLET | Refills: 0 | Status: SHIPPED | OUTPATIENT
Start: 2023-12-11 | End: 2024-03-08 | Stop reason: SDUPTHER

## 2023-12-14 ENCOUNTER — HOSPITAL ENCOUNTER (OUTPATIENT)
Dept: LAB | Facility: MEDICAL CENTER | Age: 53
End: 2023-12-14
Attending: INTERNAL MEDICINE
Payer: COMMERCIAL

## 2023-12-14 ENCOUNTER — OFFICE VISIT (OUTPATIENT)
Dept: MEDICAL GROUP | Age: 53
End: 2023-12-14
Payer: COMMERCIAL

## 2023-12-14 VITALS
HEIGHT: 67 IN | DIASTOLIC BLOOD PRESSURE: 80 MMHG | TEMPERATURE: 98.5 F | HEART RATE: 105 BPM | SYSTOLIC BLOOD PRESSURE: 114 MMHG | WEIGHT: 161 LBS | OXYGEN SATURATION: 96 % | BODY MASS INDEX: 25.27 KG/M2

## 2023-12-14 DIAGNOSIS — F51.01 PRIMARY INSOMNIA: ICD-10-CM

## 2023-12-14 DIAGNOSIS — R73.01 IFG (IMPAIRED FASTING GLUCOSE): ICD-10-CM

## 2023-12-14 DIAGNOSIS — G89.29 CHRONIC LEFT HIP PAIN: ICD-10-CM

## 2023-12-14 DIAGNOSIS — M25.552 CHRONIC LEFT HIP PAIN: ICD-10-CM

## 2023-12-14 DIAGNOSIS — Z11.4 SCREENING FOR HIV (HUMAN IMMUNODEFICIENCY VIRUS): ICD-10-CM

## 2023-12-14 DIAGNOSIS — J45.30 MILD PERSISTENT ASTHMA WITHOUT COMPLICATION: ICD-10-CM

## 2023-12-14 DIAGNOSIS — Z90.13 S/P BILATERAL MASTECTOMY: ICD-10-CM

## 2023-12-14 DIAGNOSIS — M54.42 CHRONIC LEFT-SIDED LOW BACK PAIN WITH LEFT-SIDED SCIATICA: ICD-10-CM

## 2023-12-14 DIAGNOSIS — E78.5 DYSLIPIDEMIA: ICD-10-CM

## 2023-12-14 DIAGNOSIS — I10 ESSENTIAL HYPERTENSION: ICD-10-CM

## 2023-12-14 DIAGNOSIS — G89.29 CHRONIC LEFT-SIDED LOW BACK PAIN WITH LEFT-SIDED SCIATICA: ICD-10-CM

## 2023-12-14 DIAGNOSIS — E55.9 VITAMIN D DEFICIENCY: ICD-10-CM

## 2023-12-14 PROBLEM — T28.0XXA: Status: RESOLVED | Noted: 2023-10-15 | Resolved: 2023-12-14

## 2023-12-14 PROBLEM — S22.42XD FRACTURE OF MULTIPLE RIBS OF LEFT SIDE WITH ROUTINE HEALING: Status: RESOLVED | Noted: 2022-08-20 | Resolved: 2023-12-14

## 2023-12-14 PROBLEM — B99.9 RECURRENT INFECTIONS: Status: RESOLVED | Noted: 2023-10-15 | Resolved: 2023-12-14

## 2023-12-14 LAB
RHEUMATOID FACT SER IA-ACNC: <10 IU/ML (ref 0–14)
URATE SERPL-MCNC: 4.2 MG/DL (ref 1.9–8.2)

## 2023-12-14 PROCEDURE — 99214 OFFICE O/P EST MOD 30 MIN: CPT | Performed by: INTERNAL MEDICINE

## 2023-12-14 PROCEDURE — 86038 ANTINUCLEAR ANTIBODIES: CPT

## 2023-12-14 PROCEDURE — 86200 CCP ANTIBODY: CPT

## 2023-12-14 PROCEDURE — 84550 ASSAY OF BLOOD/URIC ACID: CPT

## 2023-12-14 PROCEDURE — 36415 COLL VENOUS BLD VENIPUNCTURE: CPT

## 2023-12-14 PROCEDURE — 86431 RHEUMATOID FACTOR QUANT: CPT

## 2023-12-14 PROCEDURE — 3079F DIAST BP 80-89 MM HG: CPT | Performed by: INTERNAL MEDICINE

## 2023-12-14 PROCEDURE — 3074F SYST BP LT 130 MM HG: CPT | Performed by: INTERNAL MEDICINE

## 2023-12-14 RX ORDER — GABAPENTIN 100 MG/1
100 CAPSULE ORAL
Qty: 90 CAPSULE | Refills: 3 | Status: SHIPPED | OUTPATIENT
Start: 2023-12-14 | End: 2024-02-12

## 2023-12-14 RX ORDER — HYDROCHLOROTHIAZIDE 25 MG/1
25 TABLET ORAL DAILY
Qty: 100 TABLET | Refills: 3 | Status: SHIPPED | OUTPATIENT
Start: 2023-12-14

## 2023-12-14 ASSESSMENT — ENCOUNTER SYMPTOMS
PSYCHIATRIC NEGATIVE: 1
NEUROLOGICAL NEGATIVE: 1
EYES NEGATIVE: 1
RESPIRATORY NEGATIVE: 1
CONSTITUTIONAL NEGATIVE: 1
CARDIOVASCULAR NEGATIVE: 1
GASTROINTESTINAL NEGATIVE: 1

## 2023-12-14 ASSESSMENT — FIBROSIS 4 INDEX: FIB4 SCORE: 0.77

## 2023-12-14 NOTE — PROGRESS NOTES
Subjective     Odalys Ballard is a 53 y.o. female who presents with Follow-Up (Lab review  still having issues on her left side )  Patient is here for follow-up of her chronic left hip pain which has been playing for the last few years and worse in the last several months.  It is worse with weightbearing and range of motion activities.  She has been going to physical therapy with only minimal improvement.  She has had plain films that were negative of the hip last year but no MRI was done of the hip.  She did have an MRI of her LS spine at [facility] neurosurgery who recommended no surgical intervention, but the written report is not available.    Patient concerned she may have a vascular issue or an autoimmune disorder that might be causing the problem and would like some testing done to evaluate for this.    With regard to her asthma is under good control followed by PMA on albuterol inhaler as needed, and is wheeze free now on no inhaled steroids.    Her insomnia is well-controlled with Lunesta and gabapentin low-dose at bedtime.    Her hot flashes and perimenopausal symptoms are significantly improved and she feels so much better since starting on estrogen patches biweekly by her GYN.  She will continue on these.    She underwent bilateral prophylactic mastectomy 3 years ago because positive breast cancer gene (BRCA) and family and follow-up MRI 6 months ago of the breasts showed no tumor.  She is also status post GRADY/BSO for same reason.    No visits with results within 1 Month(s) from this visit.   Latest known visit with results is:   Hospital Outpatient Visit on 11/02/2023   Component Date Value    Immunoglobulin G 11/02/2023 755 (L)     Immunoglobulin A 11/02/2023 123     Immunoglobulin M 11/02/2023 114       Lab Results   Component Value Date/Time    HBA1C 5.2 11/02/2023 08:04 AM    HBA1C 5.6 05/17/2023 05:59 AM    HBA1C 5.6 01/24/2022 09:37 AM     Lab Results   Component Value Date/Time    SODIUM 141  "11/02/2023 08:04 AM    POTASSIUM 4.1 11/02/2023 08:04 AM    CHLORIDE 102 11/02/2023 08:04 AM    CO2 28 11/02/2023 08:04 AM    GLUCOSE 109 (H) 11/02/2023 08:04 AM    BUN 24 (H) 11/02/2023 08:04 AM    CREATININE 0.77 11/02/2023 08:04 AM    BUNCREATRAT 24 (H) 05/17/2023 05:59 AM    ALKPHOSPHAT 61 11/02/2023 08:04 AM    ASTSGOT 18 11/02/2023 08:04 AM    ALTSGPT 20 11/02/2023 08:04 AM    TBILIRUBIN 0.7 11/02/2023 08:04 AM     No results found for: \"INR\"  Lab Results   Component Value Date/Time    CHOLSTRLTOT 248 (H) 11/02/2023 08:04 AM     (H) 11/02/2023 08:04 AM    HDL 91 11/02/2023 08:04 AM    TRIGLYCERIDE 42 11/02/2023 08:04 AM       No results found for: \"TESTOSTERONE\"  Lab Results   Component Value Date/Time    TSH 1.740 05/17/2023 05:59 AM    TSH 1.490 06/19/2020 06:34 AM     No results found for: \"FREET4\"  No results found for: \"URICACID\"  No components found for: \"VITB12\"  Lab Results   Component Value Date/Time    25HYDROXY 37 11/02/2023 08:04 AM    25HYDROXY 30.0 05/17/2023 05:59 AM    25HYDROXY 38 01/24/2022 09:37 AM     '  Patient Active Problem List   Diagnosis    Mild persistent asthma without complication    Family history of breast cancer gene mutation in first degree relative    Migraine without aura and with status migrainosus, not intractable    Cervical radiculopathy at C6    Essential hypertension    IFG (impaired fasting glucose)    Vitamin D deficiency    Dyslipidemia    Primary insomnia    S/P bilateral mastectomy    Dissection of vertebral artery (HCC)- left on CTA 6/2021- Dr. Saldana    Chronic left-sided low back pain with left-sided sciatica    Eosinophilic esophagitis    Chronic left hip pain     I have discontinued Odalys [place]'s Abrysvo and Fluzone Quadrivalent. I have also changed her gabapentin and hydroCHLOROthiazide. Additionally, I am having her maintain her rizatriptan, Estradiol, aspirin, albuterol, baclofen, and Eszopiclone.            HPI    Review of Systems " "  Constitutional: Negative.    HENT: Negative.     Eyes: Negative.    Respiratory: Negative.     Cardiovascular: Negative.    Gastrointestinal: Negative.    Genitourinary: Negative.    Musculoskeletal:  Positive for joint pain.   Skin: Negative.    Neurological: Negative.    Endo/Heme/Allergies: Negative.    Psychiatric/Behavioral: Negative.                Objective     /80 (BP Location: Right arm, Patient Position: Sitting, BP Cuff Size: Adult)   Pulse (!) 105   Temp 36.9 °C (98.5 °F) (Temporal)   Ht 1.702 m (5' 7\")   Wt 73 kg (161 lb)   LMP 10/08/2016   SpO2 96%   BMI 25.22 kg/m²      Physical Exam  Vitals reviewed.   Constitutional:       General: She is not in acute distress.     Appearance: She is well-developed. She is not diaphoretic.   HENT:      Head: Normocephalic and atraumatic.      Right Ear: External ear normal.      Left Ear: External ear normal.      Nose: Nose normal.      Mouth/Throat:      Pharynx: No oropharyngeal exudate.   Eyes:      General: No scleral icterus.        Right eye: No discharge.         Left eye: No discharge.      Conjunctiva/sclera: Conjunctivae normal.      Pupils: Pupils are equal, round, and reactive to light.   Neck:      Thyroid: No thyromegaly.      Vascular: No JVD.      Trachea: No tracheal deviation.   Cardiovascular:      Rate and Rhythm: Normal rate and regular rhythm.      Heart sounds: Normal heart sounds. No murmur heard.     No friction rub. No gallop.   Pulmonary:      Effort: Pulmonary effort is normal. No respiratory distress.      Breath sounds: Normal breath sounds. No stridor. No wheezing or rales.   Chest:      Chest wall: No tenderness.   Abdominal:      General: Bowel sounds are normal. There is no distension.      Palpations: Abdomen is soft. There is no mass.      Tenderness: There is no abdominal tenderness. There is no guarding or rebound.   Musculoskeletal:         General: Tenderness present. Normal range of motion.      Cervical back: " Normal range of motion and neck supple.      Comments: Left hip shows some mild diffuse tenderness laterally and on the inner groin area as well with some mild pain elicited on passive range of motion of the left hip.   Lymphadenopathy:      Cervical: No cervical adenopathy.   Skin:     General: Skin is warm and dry.      Coloration: Skin is not pale.      Findings: No erythema or rash.   Neurological:      Mental Status: She is alert and oriented to person, place, and time.      Cranial Nerves: No cranial nerve deficit.      Motor: No abnormal muscle tone.      Coordination: Coordination normal.      Deep Tendon Reflexes: Reflexes are normal and symmetric. Reflexes normal.   Psychiatric:         Behavior: Behavior normal.         Thought Content: Thought content normal.         Judgment: Judgment normal.                             Assessment & Plan        1. Chronic left hip pain  I suspect patient probably has impingement syndrome of her left hip since x-ray a year ago showed no bony abnormalities.  MRI of the hip ordered and referral to orthopedic surgeon placed for follow-up.  She might benefit as she has not passed the cortisone injection.    Patient also will have autoimmune and other rheumatological issues evaluated with screening tests and vascular etiology ruled out with both arterial and venous noninvasive exams in the vascular lab.    Will call results.  Otherwise follow-up in 6 months.  But this probably primarily delegated to the orthopedic surgeon.    - Sed Rate; Future  - MR-HIP-W/O; Future  - VJ IGG CATHIE W/RFLX TO VJ IGG IFA; Future  - CCP ANTIBODIES, IGG/IGA; Future  - RHEUMATOID ARTHRITIS FACTOR; Future  - URIC ACID; Future  - US-EXTREMITY VENOUS LOWER BILAT; Future  - US-EXTREMITY ARTERY LOWER BILAT W/RODRIGUEZ (COMBO); Future  - Referral to Orthopedics  - gabapentin (NEURONTIN) 100 MG Cap; Take 1 Capsule by mouth at bedtime.  Dispense: 90 Capsule; Refill: 3    2. IFG (impaired fasting glucose)  Not  at goal.  Mediterranean diet and exercise.  - HEMOGLOBIN A1C; Future    3. Essential hypertension     Good control on current regimen.  Continue unchanged.  - hydroCHLOROthiazide 25 MG Tab; Take 1 Tablet by mouth every day.  Dispense: 100 Tablet; Refill: 3    4. Dyslipidemia  Good control Mediterranean diet and exercise although her LDL slightly elevated she has compensated by her good HDL and her Barton risk score is still quite low at only 2% for a 10-year cardiovascular risk..  - Comp Metabolic Panel; Future  - CBC WITH DIFFERENTIAL; Future  - TSH; Future  - Lipid Profile; Future    5. Screening for HIV (human immunodeficiency virus)     - HIV AG/AB COMBO ASSAY SCREENING; Future    6. Vitamin D deficiency  Good control continue vitamin D3 2000 units daily.  - VITAMIN D,25 HYDROXY (DEFICIENCY); Future    7. Primary insomnia  Good control continue gabapentin 100 mg at bedtime.  She may increase to 300 as needed and tolerated.  - gabapentin (NEURONTIN) 100 MG Cap; Take 1 Capsule by mouth at bedtime.  Dispense: 90 Capsule; Refill: 3    8. Chronic left hip pain- ellie- dr jenkins  As above  - Sed Rate; Future  - MR-HIP-W/O; Future  - VJ IGG CATHIE W/RFLX TO VJ IGG IFA; Future  - CCP ANTIBODIES, IGG/IGA; Future  - RHEUMATOID ARTHRITIS FACTOR; Future  - URIC ACID; Future  - US-EXTREMITY VENOUS LOWER BILAT; Future  - US-EXTREMITY ARTERY LOWER BILAT W/RODRIGUEZ (COMBO); Future  - Referral to Orthopedics  - gabapentin (NEURONTIN) 100 MG Cap; Take 1 Capsule by mouth at bedtime.  Dispense: 90 Capsule; Refill: 3    9. Mild persistent asthma without complication  As above good control with as needed albuterol inhaler.  No ICS.  Continue follow-up with [facility] Dr. Lepley    10. Chronic left-sided low back pain with left-sided sciatica  As above.  Does not appear to be a spinal disorder since she is already been evaluated by neurosurgery with the MRI of the LS spine.    11. S/P bilateral mastectomy-2020 for BRCA gene and positive  family history  Prophylactically done with no evidence of complications or tumor on breast MRI earlier this year..

## 2023-12-16 LAB — NUCLEAR IGG SER QL IA: NORMAL

## 2023-12-24 LAB — CCP IGA+IGG SERPL IA-ACNC: 5 UNITS (ref 0–19)

## 2023-12-27 ENCOUNTER — HOSPITAL ENCOUNTER (OUTPATIENT)
Dept: LAB | Facility: MEDICAL CENTER | Age: 53
End: 2023-12-27
Attending: FAMILY MEDICINE
Payer: COMMERCIAL

## 2023-12-27 LAB
25(OH)D3 SERPL-MCNC: 33 NG/ML (ref 30–100)
ALBUMIN SERPL BCP-MCNC: 4.9 G/DL (ref 3.2–4.9)
ALBUMIN/GLOB SERPL: 2 G/DL
ALP SERPL-CCNC: 58 U/L (ref 30–99)
ALT SERPL-CCNC: 22 U/L (ref 2–50)
ANION GAP SERPL CALC-SCNC: 11 MMOL/L (ref 7–16)
AST SERPL-CCNC: 24 U/L (ref 12–45)
BASOPHILS # BLD AUTO: 1.3 % (ref 0–1.8)
BASOPHILS # BLD: 0.07 K/UL (ref 0–0.12)
BILIRUB SERPL-MCNC: 0.4 MG/DL (ref 0.1–1.5)
BUN SERPL-MCNC: 29 MG/DL (ref 8–22)
CALCIUM ALBUM COR SERPL-MCNC: 8.7 MG/DL (ref 8.5–10.5)
CALCIUM SERPL-MCNC: 9.4 MG/DL (ref 8.5–10.5)
CHLORIDE SERPL-SCNC: 105 MMOL/L (ref 96–112)
CHOLEST SERPL-MCNC: 245 MG/DL (ref 100–199)
CO2 SERPL-SCNC: 25 MMOL/L (ref 20–33)
CREAT SERPL-MCNC: 0.77 MG/DL (ref 0.5–1.4)
CRP SERPL HS-MCNC: <0.3 MG/DL (ref 0–0.75)
DHEA-S SERPL-MCNC: 79.4 UG/DL (ref 35.4–256)
EOSINOPHIL # BLD AUTO: 0.44 K/UL (ref 0–0.51)
EOSINOPHIL NFR BLD: 8 % (ref 0–6.9)
ERYTHROCYTE [DISTWIDTH] IN BLOOD BY AUTOMATED COUNT: 41.1 FL (ref 35.9–50)
EST. AVERAGE GLUCOSE BLD GHB EST-MCNC: 111 MG/DL
ESTRADIOL SERPL-MCNC: <5 PG/ML
FASTING STATUS PATIENT QL REPORTED: NORMAL
FSH SERPL-ACNC: 35 MIU/ML
GFR SERPLBLD CREATININE-BSD FMLA CKD-EPI: 93 ML/MIN/1.73 M 2
GLOBULIN SER CALC-MCNC: 2.5 G/DL (ref 1.9–3.5)
GLUCOSE SERPL-MCNC: 95 MG/DL (ref 65–99)
HBA1C MFR BLD: 5.5 % (ref 4–5.6)
HCT VFR BLD AUTO: 46.7 % (ref 37–47)
HCYS SERPL-SCNC: 8.22 UMOL/L
HDLC SERPL-MCNC: 91 MG/DL
HGB BLD-MCNC: 15.5 G/DL (ref 12–16)
IMM GRANULOCYTES # BLD AUTO: 0.02 K/UL (ref 0–0.11)
IMM GRANULOCYTES NFR BLD AUTO: 0.4 % (ref 0–0.9)
LDLC SERPL CALC-MCNC: 142 MG/DL
LH SERPL-ACNC: 22 IU/L
LYMPHOCYTES # BLD AUTO: 1.55 K/UL (ref 1–4.8)
LYMPHOCYTES NFR BLD: 28.3 % (ref 22–41)
MCH RBC QN AUTO: 31.7 PG (ref 27–33)
MCHC RBC AUTO-ENTMCNC: 33.2 G/DL (ref 32.2–35.5)
MCV RBC AUTO: 95.5 FL (ref 81.4–97.8)
MONOCYTES # BLD AUTO: 0.54 K/UL (ref 0–0.85)
MONOCYTES NFR BLD AUTO: 9.9 % (ref 0–13.4)
NEUTROPHILS # BLD AUTO: 2.86 K/UL (ref 1.82–7.42)
NEUTROPHILS NFR BLD: 52.1 % (ref 44–72)
NRBC # BLD AUTO: 0 K/UL
NRBC BLD-RTO: 0 /100 WBC (ref 0–0.2)
PLATELET # BLD AUTO: 262 K/UL (ref 164–446)
PMV BLD AUTO: 10.1 FL (ref 9–12.9)
POTASSIUM SERPL-SCNC: 4.4 MMOL/L (ref 3.6–5.5)
PROGEST SERPL-MCNC: 0.21 NG/ML
PROLACTIN SERPL-MCNC: 11.3 NG/ML (ref 2.8–26)
PROT SERPL-MCNC: 7.4 G/DL (ref 6–8.2)
RBC # BLD AUTO: 4.89 M/UL (ref 4.2–5.4)
SODIUM SERPL-SCNC: 141 MMOL/L (ref 135–145)
T3FREE SERPL-MCNC: 3.1 PG/ML (ref 2–4.4)
T4 FREE SERPL-MCNC: 1.14 NG/DL (ref 0.93–1.7)
THYROPEROXIDASE AB SERPL-ACNC: 9 IU/ML (ref 0–9)
TRIGL SERPL-MCNC: 58 MG/DL (ref 0–149)
TSH SERPL DL<=0.005 MIU/L-ACNC: 1.34 UIU/ML (ref 0.38–5.33)
WBC # BLD AUTO: 5.5 K/UL (ref 4.8–10.8)

## 2023-12-27 PROCEDURE — 84140 ASSAY OF PREGNENOLONE: CPT

## 2023-12-27 PROCEDURE — 84482 T3 REVERSE: CPT

## 2023-12-27 PROCEDURE — 82627 DEHYDROEPIANDROSTERONE: CPT

## 2023-12-27 PROCEDURE — 36415 COLL VENOUS BLD VENIPUNCTURE: CPT

## 2023-12-27 PROCEDURE — 85025 COMPLETE CBC W/AUTO DIFF WBC: CPT

## 2023-12-27 PROCEDURE — 84146 ASSAY OF PROLACTIN: CPT

## 2023-12-27 PROCEDURE — 83002 ASSAY OF GONADOTROPIN (LH): CPT

## 2023-12-27 PROCEDURE — 84144 ASSAY OF PROGESTERONE: CPT

## 2023-12-27 PROCEDURE — 80053 COMPREHEN METABOLIC PANEL: CPT

## 2023-12-27 PROCEDURE — 84443 ASSAY THYROID STIM HORMONE: CPT

## 2023-12-27 PROCEDURE — 83090 ASSAY OF HOMOCYSTEINE: CPT

## 2023-12-27 PROCEDURE — 83525 ASSAY OF INSULIN: CPT

## 2023-12-27 PROCEDURE — 86800 THYROGLOBULIN ANTIBODY: CPT

## 2023-12-27 PROCEDURE — 82679 ASSAY OF ESTRONE: CPT

## 2023-12-27 PROCEDURE — 86376 MICROSOMAL ANTIBODY EACH: CPT

## 2023-12-27 PROCEDURE — 84439 ASSAY OF FREE THYROXINE: CPT

## 2023-12-27 PROCEDURE — 83001 ASSAY OF GONADOTROPIN (FSH): CPT

## 2023-12-27 PROCEDURE — 84402 ASSAY OF FREE TESTOSTERONE: CPT

## 2023-12-27 PROCEDURE — 82306 VITAMIN D 25 HYDROXY: CPT

## 2023-12-27 PROCEDURE — 86140 C-REACTIVE PROTEIN: CPT

## 2023-12-27 PROCEDURE — 84481 FREE ASSAY (FT-3): CPT

## 2023-12-27 PROCEDURE — 84270 ASSAY OF SEX HORMONE GLOBUL: CPT

## 2023-12-27 PROCEDURE — 82670 ASSAY OF TOTAL ESTRADIOL: CPT

## 2023-12-27 PROCEDURE — 84403 ASSAY OF TOTAL TESTOSTERONE: CPT

## 2023-12-27 PROCEDURE — 83036 HEMOGLOBIN GLYCOSYLATED A1C: CPT

## 2023-12-27 PROCEDURE — 80061 LIPID PANEL: CPT

## 2023-12-29 LAB
INSULIN P FAST SERPL-ACNC: 5 UIU/ML (ref 3–25)
THYROGLOB AB SERPL-ACNC: <0.9 IU/ML (ref 0–4)

## 2023-12-30 LAB
PREG SERPL-MCNC: 113 NG/DL (ref 15–132)
T3REVERSE SERPL-MCNC: 12 NG/DL (ref 9–27)

## 2023-12-31 LAB — ESTRONE SERPL-MCNC: 21.7 PG/ML

## 2024-01-01 LAB
SHBG SERPL-SCNC: 51 NMOL/L (ref 17–125)
TESTOST FREE SERPL-MCNC: 1.9 PG/ML (ref 1.1–5.8)
TESTOST SERPL-MCNC: 15 NG/DL (ref 9–55)

## 2024-01-07 DIAGNOSIS — M62.830 MUSCLE SPASM OF BACK: ICD-10-CM

## 2024-01-08 RX ORDER — BACLOFEN 10 MG/1
TABLET ORAL
Qty: 100 TABLET | Refills: 0 | Status: SHIPPED | OUTPATIENT
Start: 2024-01-08 | End: 2024-02-12

## 2024-01-11 ENCOUNTER — APPOINTMENT (OUTPATIENT)
Dept: RADIOLOGY | Facility: MEDICAL CENTER | Age: 54
End: 2024-01-11
Attending: INTERNAL MEDICINE
Payer: COMMERCIAL

## 2024-01-18 ENCOUNTER — APPOINTMENT (OUTPATIENT)
Dept: RADIOLOGY | Facility: MEDICAL CENTER | Age: 54
End: 2024-01-18
Attending: OBSTETRICS & GYNECOLOGY
Payer: COMMERCIAL

## 2024-01-18 DIAGNOSIS — R10.32 ABDOMINAL PAIN, LEFT LOWER QUADRANT: ICD-10-CM

## 2024-01-18 PROCEDURE — 76830 TRANSVAGINAL US NON-OB: CPT

## 2024-01-25 ENCOUNTER — HOSPITAL ENCOUNTER (OUTPATIENT)
Dept: RADIOLOGY | Facility: MEDICAL CENTER | Age: 54
End: 2024-01-25
Attending: INTERNAL MEDICINE
Payer: COMMERCIAL

## 2024-01-25 DIAGNOSIS — M25.552 CHRONIC LEFT HIP PAIN: ICD-10-CM

## 2024-01-25 DIAGNOSIS — G89.29 CHRONIC LEFT HIP PAIN: ICD-10-CM

## 2024-01-25 PROCEDURE — 93922 UPR/L XTREMITY ART 2 LEVELS: CPT

## 2024-01-25 PROCEDURE — 93970 EXTREMITY STUDY: CPT

## 2024-02-09 ENCOUNTER — PHARMACY VISIT (OUTPATIENT)
Dept: PHARMACY | Facility: MEDICAL CENTER | Age: 54
End: 2024-02-09
Payer: COMMERCIAL

## 2024-02-09 PROCEDURE — RXMED WILLOW AMBULATORY MEDICATION CHARGE: Performed by: INTERNAL MEDICINE

## 2024-02-09 RX ORDER — COVID-19 VACCINE, MRNA 0.04 MG/.418ML
0.3 INJECTION, SUSPENSION INTRAMUSCULAR
Qty: 0.3 ML | Refills: 0 | Status: SHIPPED | OUTPATIENT
Start: 2024-02-09 | End: 2024-03-08

## 2024-02-10 DIAGNOSIS — M62.830 MUSCLE SPASM OF BACK: ICD-10-CM

## 2024-02-11 DIAGNOSIS — G43.001 MIGRAINE WITHOUT AURA AND WITH STATUS MIGRAINOSUS, NOT INTRACTABLE: ICD-10-CM

## 2024-02-12 DIAGNOSIS — M25.552 CHRONIC LEFT HIP PAIN: ICD-10-CM

## 2024-02-12 DIAGNOSIS — G89.29 CHRONIC LEFT HIP PAIN: ICD-10-CM

## 2024-02-12 DIAGNOSIS — F51.01 PRIMARY INSOMNIA: ICD-10-CM

## 2024-02-12 RX ORDER — BACLOFEN 10 MG/1
TABLET ORAL
Qty: 100 TABLET | Refills: 0 | Status: SHIPPED | OUTPATIENT
Start: 2024-02-12 | End: 2024-03-08 | Stop reason: SDUPTHER

## 2024-02-12 RX ORDER — RIZATRIPTAN BENZOATE 10 MG/1
TABLET ORAL
Qty: 12 TABLET | Refills: 0 | Status: SHIPPED | OUTPATIENT
Start: 2024-02-12 | End: 2024-03-08 | Stop reason: SDUPTHER

## 2024-02-12 RX ORDER — GABAPENTIN 100 MG/1
100 CAPSULE ORAL
Qty: 90 CAPSULE | Refills: 3 | Status: SHIPPED | OUTPATIENT
Start: 2024-02-12

## 2024-02-28 ENCOUNTER — APPOINTMENT (OUTPATIENT)
Dept: MEDICAL GROUP | Age: 54
End: 2024-02-28
Payer: COMMERCIAL

## 2024-03-05 DIAGNOSIS — F51.01 PRIMARY INSOMNIA: ICD-10-CM

## 2024-03-05 RX ORDER — ESZOPICLONE 3 MG/1
3 TABLET, FILM COATED ORAL EVERY EVENING
Qty: 90 TABLET | Refills: 0 | OUTPATIENT
Start: 2024-03-05 | End: 2024-06-03

## 2024-03-05 NOTE — TELEPHONE ENCOUNTER
Received request via: Pharmacy    Was the patient seen in the last year in this department? Yes    Does the patient have an active prescription (recently filled or refills available) for medication(s) requested? No    Pharmacy Name: [facility]    Does the patient have FPC Plus and need 100 day supply (blood pressure, diabetes and cholesterol meds only)? Patient does not have SCP

## 2024-03-08 ENCOUNTER — OFFICE VISIT (OUTPATIENT)
Dept: MEDICAL GROUP | Age: 54
End: 2024-03-08
Payer: COMMERCIAL

## 2024-03-08 VITALS
SYSTOLIC BLOOD PRESSURE: 120 MMHG | RESPIRATION RATE: 16 BRPM | HEIGHT: 67 IN | WEIGHT: 151 LBS | TEMPERATURE: 97 F | OXYGEN SATURATION: 98 % | BODY MASS INDEX: 23.7 KG/M2 | DIASTOLIC BLOOD PRESSURE: 70 MMHG | HEART RATE: 100 BPM

## 2024-03-08 DIAGNOSIS — G43.001 MIGRAINE WITHOUT AURA AND WITH STATUS MIGRAINOSUS, NOT INTRACTABLE: ICD-10-CM

## 2024-03-08 DIAGNOSIS — M62.830 MUSCLE SPASM OF BACK: ICD-10-CM

## 2024-03-08 DIAGNOSIS — F51.01 PRIMARY INSOMNIA: ICD-10-CM

## 2024-03-08 PROCEDURE — 3074F SYST BP LT 130 MM HG: CPT | Performed by: PHYSICIAN ASSISTANT

## 2024-03-08 PROCEDURE — 99214 OFFICE O/P EST MOD 30 MIN: CPT | Performed by: PHYSICIAN ASSISTANT

## 2024-03-08 PROCEDURE — 3078F DIAST BP <80 MM HG: CPT | Performed by: PHYSICIAN ASSISTANT

## 2024-03-08 RX ORDER — ESTRADIOL 0.05 MG/D
PATCH, EXTENDED RELEASE TRANSDERMAL
COMMUNITY
Start: 2024-03-03

## 2024-03-08 RX ORDER — BACLOFEN 10 MG/1
TABLET ORAL
Qty: 100 TABLET | Refills: 0 | Status: SHIPPED | OUTPATIENT
Start: 2024-03-08

## 2024-03-08 RX ORDER — ESZOPICLONE 3 MG/1
3 TABLET, FILM COATED ORAL EVERY EVENING
Qty: 90 TABLET | Refills: 0 | Status: SHIPPED | OUTPATIENT
Start: 2024-03-08 | End: 2024-06-06

## 2024-03-08 RX ORDER — RIZATRIPTAN BENZOATE 10 MG/1
TABLET ORAL
Qty: 12 TABLET | Refills: 3 | Status: SHIPPED | OUTPATIENT
Start: 2024-03-08

## 2024-03-08 RX ORDER — ESTRADIOL 0.1 MG/G
CREAM VAGINAL DAILY
COMMUNITY
Start: 2024-03-05

## 2024-03-08 RX ORDER — ACYCLOVIR 400 MG/1
400 TABLET ORAL 3 TIMES DAILY
COMMUNITY
Start: 2024-02-15

## 2024-03-08 RX ORDER — ESZOPICLONE 3 MG/1
3 TABLET, FILM COATED ORAL EVERY EVENING
Qty: 90 TABLET | Refills: 0 | Status: SHIPPED | OUTPATIENT
Start: 2024-03-08 | End: 2024-03-08 | Stop reason: SDUPTHER

## 2024-03-08 ASSESSMENT — FIBROSIS 4 INDEX: FIB4 SCORE: 1

## 2024-03-08 ASSESSMENT — PATIENT HEALTH QUESTIONNAIRE - PHQ9: CLINICAL INTERPRETATION OF PHQ2 SCORE: 0

## 2024-03-08 NOTE — PROGRESS NOTES
"cc: Medication refill  Subjective:     HPI  PCP-Dr. Moralez.  Odalys Ballard is a 53 y.o. female presenting for medication refill.  She is not able to get into her PCP.  She is currently taking 3 mg of Lunesta nightly.  Has been stable at this dose for many years, does help her fall asleep and stay asleep.    She is also requesting refill of her rizatriptan.  Does help for abortive therapies for her migraine.    She also takes baclofen as needed for muscle spasms.  Is currently working with sports medicine, receiving injections, physical therapy.        Review of systems:  See above.       Current Outpatient Medications:     acyclovir (ZOVIRAX) 400 MG tablet, Take 400 mg by mouth 3 times a day., Disp: , Rfl:     rizatriptan (MAXALT) 10 MG tablet, TAKE 1 TABLET BY MOUTH ONCE DAILY AS NEEDED FOR MIGRAINE HEADACHE, Disp: 12 Tablet, Rfl: 3    baclofen (LIORESAL) 10 MG Tab, Take 1 tablet by mouth three times daily as needed for muscle spasm, Disp: 100 Tablet, Rfl: 0    estradiol (ESTRACE) 0.1 MG/GM vaginal cream, Insert  into the vagina every day., Disp: , Rfl:     estradiol (VIVELLE DOT) 0.05 MG/24HR PATCH BIWEEKLY, , Disp: , Rfl:     Eszopiclone 3 MG Tab, Take 1 Tablet by mouth every evening for 90 days., Disp: 90 Tablet, Rfl: 0    gabapentin (NEURONTIN) 100 MG Cap, Take 1 capsule by mouth at bedtime, Disp: 90 Capsule, Rfl: 3    hydroCHLOROthiazide 25 MG Tab, Take 1 Tablet by mouth every day., Disp: 100 Tablet, Rfl: 3    albuterol 108 (90 Base) MCG/ACT Aero Soln inhalation aerosol, Inhale 2 Puffs every 6 hours as needed for Shortness of Breath., Disp: 8.5 g, Rfl: 11    Allergies, past medical history, past surgical history, family history, social history reviewed and updated    Objective:     Vitals: /70 (BP Location: Left arm, Patient Position: Sitting, BP Cuff Size: Adult)   Pulse 100   Temp 36.1 °C (97 °F) (Temporal)   Resp 16   Ht 1.702 m (5' 7\")   Wt 68.5 kg (151 lb)   LMP 10/08/2016   SpO2 98%   BMI " 23.65 kg/m²   General: Alert, pleasant, NAD  HEENT: Normocephalic. Neck supple.   No carotid bruits   Heart: Regular rate and rhythm.  S1 and S2 normal.  No murmurs appreciated.  Respiratory: Normal respiratory effort.  Clear to auscultation bilaterally.  Skin: Warm, dry, no rashes.  Psych:  Affect/mood is normal, judgement is good, memory is intact, grooming is appropriate.    Assessment/Plan:     Odalys was seen today for medication refill.    Diagnoses and all orders for this visit:    Primary insomnia  -Stable.  Continue Lunesta nightly.  PDMP checked.  Prescription sent to the pharmacy  -     Eszopiclone 3 MG Tab; Take 1 Tablet by mouth every evening for 90 days.    Migraine without aura and with status migrainosus, not intractable  -Stable.  Continue Maxalt as needed for abortive therapies  -     rizatriptan (MAXALT) 10 MG tablet; TAKE 1 TABLET BY MOUTH ONCE DAILY AS NEEDED FOR MIGRAINE HEADACHE    Muscle spasm of back  -Currently working with orthopedics, physical therapy, sports medicine.  Continue baclofen prn  -     baclofen (LIORESAL) 10 MG Tab; Take 1 tablet by mouth three times daily as needed for muscle spasm        Return in about 2 months (around 5/8/2024) for w/pcp continue medication management.

## 2024-03-18 ENCOUNTER — TELEPHONE (OUTPATIENT)
Dept: MEDICAL GROUP | Age: 54
End: 2024-03-18
Payer: COMMERCIAL

## 2024-03-18 NOTE — TELEPHONE ENCOUNTER
Peggy from radiology scheduling called and states the MRI was denied by insurance , Insurance states that patient either needs 6 weeks of provider treatment along with PT or injections she wanted to give you a heads up

## 2024-03-19 ENCOUNTER — APPOINTMENT (OUTPATIENT)
Dept: MEDICAL GROUP | Age: 54
End: 2024-03-19
Payer: COMMERCIAL

## 2024-03-21 ENCOUNTER — APPOINTMENT (OUTPATIENT)
Dept: RADIOLOGY | Facility: MEDICAL CENTER | Age: 54
End: 2024-03-21
Attending: INTERNAL MEDICINE
Payer: COMMERCIAL

## 2024-04-10 DIAGNOSIS — M62.830 MUSCLE SPASM OF BACK: ICD-10-CM

## 2024-04-10 RX ORDER — BACLOFEN 10 MG/1
TABLET ORAL
Qty: 100 TABLET | Refills: 0 | Status: SHIPPED | OUTPATIENT
Start: 2024-04-10

## 2024-04-30 ENCOUNTER — OFFICE VISIT (OUTPATIENT)
Dept: MEDICAL GROUP | Age: 54
End: 2024-04-30
Payer: COMMERCIAL

## 2024-04-30 VITALS
WEIGHT: 144 LBS | BODY MASS INDEX: 22.6 KG/M2 | HEIGHT: 67 IN | TEMPERATURE: 98 F | SYSTOLIC BLOOD PRESSURE: 110 MMHG | OXYGEN SATURATION: 95 % | DIASTOLIC BLOOD PRESSURE: 84 MMHG | HEART RATE: 114 BPM

## 2024-04-30 DIAGNOSIS — R10.32 LEFT LOWER QUADRANT ABDOMINAL PAIN: ICD-10-CM

## 2024-04-30 DIAGNOSIS — M25.552 CHRONIC LEFT HIP PAIN: ICD-10-CM

## 2024-04-30 DIAGNOSIS — G89.29 CHRONIC LEFT HIP PAIN: ICD-10-CM

## 2024-04-30 DIAGNOSIS — E78.5 DYSLIPIDEMIA: ICD-10-CM

## 2024-04-30 DIAGNOSIS — R63.5 WEIGHT GAIN: ICD-10-CM

## 2024-04-30 DIAGNOSIS — F51.01 PRIMARY INSOMNIA: ICD-10-CM

## 2024-04-30 DIAGNOSIS — R73.01 IFG (IMPAIRED FASTING GLUCOSE): ICD-10-CM

## 2024-04-30 DIAGNOSIS — E55.9 VITAMIN D DEFICIENCY: ICD-10-CM

## 2024-04-30 DIAGNOSIS — R22.42 MASS OF LEFT THIGH: ICD-10-CM

## 2024-04-30 RX ORDER — GABAPENTIN 100 MG/1
100 CAPSULE ORAL 3 TIMES DAILY
Qty: 300 CAPSULE | Refills: 3 | Status: SHIPPED | OUTPATIENT
Start: 2024-04-30

## 2024-04-30 RX ORDER — SEMAGLUTIDE 0.68 MG/ML
0.5 INJECTION, SOLUTION SUBCUTANEOUS
Status: SHIPPED
Start: 2024-04-30

## 2024-04-30 ASSESSMENT — ENCOUNTER SYMPTOMS
EYES NEGATIVE: 1
NEUROLOGICAL NEGATIVE: 1
ABDOMINAL PAIN: 1
CONSTITUTIONAL NEGATIVE: 1
RESPIRATORY NEGATIVE: 1
PSYCHIATRIC NEGATIVE: 1
CARDIOVASCULAR NEGATIVE: 1

## 2024-04-30 ASSESSMENT — FIBROSIS 4 INDEX: FIB4 SCORE: 1

## 2024-04-30 NOTE — PROGRESS NOTES
Subjective     Odalys Ballard is a 53 y.o. female who presents with Follow-Up (Possible hernia needs referral to see general surgery )  Patient concerned about a possible inguinal or abdominal hernia wants referral to general surgeon for this.  This was placed.  Also  The patient is here for followup of chronic medical problems listed below. The patient is compliant with medications and having no side effects from them. Denies chest pain, abdominal pain, dyspnea, myalgias, or cough.   Patient Active Problem List   Diagnosis    Mild persistent asthma without complication    Family history of breast cancer gene mutation in first degree relative    Migraine without aura and with status migrainosus, not intractable    Cervical radiculopathy at C6    Essential hypertension    IFG (impaired fasting glucose)    Vitamin D deficiency    Dyslipidemia    Primary insomnia    S/P bilateral mastectomy    Dissection of vertebral artery (HCC)- left on CTA 6/2021- Dr. Saldana    Chronic left-sided low back pain with left-sided sciatica    Eosinophilic esophagitis    Chronic left hip pain     Outpatient Medications Prior to Visit   Medication Sig Dispense Refill    baclofen (LIORESAL) 10 MG Tab Take 1 tablet by mouth three times daily as needed for muscle spasm 100 Tablet 0    acyclovir (ZOVIRAX) 400 MG tablet Take 400 mg by mouth 3 times a day.      rizatriptan (MAXALT) 10 MG tablet TAKE 1 TABLET BY MOUTH ONCE DAILY AS NEEDED FOR MIGRAINE HEADACHE 12 Tablet 3    estradiol (ESTRACE) 0.1 MG/GM vaginal cream Insert  into the vagina every day.      estradiol (VIVELLE DOT) 0.05 MG/24HR PATCH BIWEEKLY       Eszopiclone 3 MG Tab Take 1 Tablet by mouth every evening for 90 days. 90 Tablet 0    hydroCHLOROthiazide 25 MG Tab Take 1 Tablet by mouth every day. 100 Tablet 3    albuterol 108 (90 Base) MCG/ACT Aero Soln inhalation aerosol Inhale 2 Puffs every 6 hours as needed for Shortness of Breath. 8.5 g 11    gabapentin (NEURONTIN) 100 MG Cap  "Take 1 capsule by mouth at bedtime 90 Capsule 3     No facility-administered medications prior to visit.               HPI    Review of Systems   Constitutional: Negative.    HENT: Negative.     Eyes: Negative.    Respiratory: Negative.     Cardiovascular: Negative.    Gastrointestinal:  Positive for abdominal pain.   Genitourinary: Negative.    Musculoskeletal:  Positive for joint pain.   Skin: Negative.    Neurological: Negative.    Endo/Heme/Allergies: Negative.    Psychiatric/Behavioral: Negative.                Objective     /84 (BP Location: Right arm, Patient Position: Sitting, BP Cuff Size: Adult)   Pulse (!) 114   Temp 36.7 °C (98 °F) (Temporal)   Ht 1.702 m (5' 7\")   Wt 65.3 kg (144 lb)   LMP 10/08/2016   SpO2 95%   BMI 22.55 kg/m²      Physical Exam  Vitals reviewed.   Constitutional:       General: She is not in acute distress.     Appearance: She is well-developed. She is not diaphoretic.   HENT:      Head: Normocephalic and atraumatic.      Right Ear: External ear normal.      Left Ear: External ear normal.      Nose: Nose normal.      Mouth/Throat:      Pharynx: No oropharyngeal exudate.   Eyes:      General: No scleral icterus.        Right eye: No discharge.         Left eye: No discharge.      Conjunctiva/sclera: Conjunctivae normal.      Pupils: Pupils are equal, round, and reactive to light.   Neck:      Thyroid: No thyromegaly.      Vascular: No JVD.      Trachea: No tracheal deviation.   Cardiovascular:      Rate and Rhythm: Normal rate and regular rhythm.      Heart sounds: Normal heart sounds. No murmur heard.     No friction rub. No gallop.   Pulmonary:      Effort: Pulmonary effort is normal. No respiratory distress.      Breath sounds: Normal breath sounds. No stridor. No wheezing or rales.   Chest:      Chest wall: No tenderness.   Abdominal:      General: Bowel sounds are normal. There is no distension.      Palpations: Abdomen is soft. There is no mass.      Tenderness: " There is abdominal tenderness. There is no guarding or rebound.      Comments: There is mild diffuse tenderness to the left lower quadrant without rebound.  No palpable masses.   Musculoskeletal:         General: Tenderness present. Normal range of motion.      Cervical back: Normal range of motion and neck supple.      Comments: There is mild tenderness in the left groin.  But full range of motion left hip.  There is also mild tenderness to the left inner thigh.  But no masses appreciated.   Lymphadenopathy:      Cervical: No cervical adenopathy.   Skin:     General: Skin is warm and dry.      Coloration: Skin is not pale.      Findings: No erythema or rash.   Neurological:      Mental Status: She is alert and oriented to person, place, and time.      Cranial Nerves: No cranial nerve deficit.      Motor: No abnormal muscle tone.      Coordination: Coordination normal.      Deep Tendon Reflexes: Reflexes are normal and symmetric. Reflexes normal.   Psychiatric:         Behavior: Behavior normal.         Thought Content: Thought content normal.         Judgment: Judgment normal.       No visits with results within 1 Month(s) from this visit.   Latest known visit with results is:   Hospital Outpatient Visit on 12/27/2023   Component Date Value    WBC 12/27/2023 5.5     RBC 12/27/2023 4.89     Hemoglobin 12/27/2023 15.5     Hematocrit 12/27/2023 46.7     MCV 12/27/2023 95.5     MCH 12/27/2023 31.7     MCHC 12/27/2023 33.2     RDW 12/27/2023 41.1     Platelet Count 12/27/2023 262     MPV 12/27/2023 10.1     Neutrophils-Polys 12/27/2023 52.10     Lymphocytes 12/27/2023 28.30     Monocytes 12/27/2023 9.90     Eosinophils 12/27/2023 8.00 (H)     Basophils 12/27/2023 1.30     Immature Granulocytes 12/27/2023 0.40     Nucleated RBC 12/27/2023 0.00     Neutrophils (Absolute) 12/27/2023 2.86     Lymphs (Absolute) 12/27/2023 1.55     Monos (Absolute) 12/27/2023 0.54     Eos (Absolute) 12/27/2023 0.44     Baso (Absolute)  12/27/2023 0.07     Immature Granulocytes (a* 12/27/2023 0.02     NRBC (Absolute) 12/27/2023 0.00     Sodium 12/27/2023 141     Potassium 12/27/2023 4.4     Chloride 12/27/2023 105     Co2 12/27/2023 25     Anion Gap 12/27/2023 11.0     Glucose 12/27/2023 95     Bun 12/27/2023 29 (H)     Creatinine 12/27/2023 0.77     Calcium 12/27/2023 9.4     Correct Calcium 12/27/2023 8.7     AST(SGOT) 12/27/2023 24     ALT(SGPT) 12/27/2023 22     Alkaline Phosphatase 12/27/2023 58     Total Bilirubin 12/27/2023 0.4     Albumin 12/27/2023 4.9     Total Protein 12/27/2023 7.4     Globulin 12/27/2023 2.5     A-G Ratio 12/27/2023 2.0     Cholesterol,Tot 12/27/2023 245 (H)     Triglycerides 12/27/2023 58     HDL 12/27/2023 91     LDL 12/27/2023 142 (H)     TSH 12/27/2023 1.340     T3,Free 12/27/2023 3.10     Free T-4 12/27/2023 1.14     Reverse T3 12/27/2023 12.0     Anti-Thyroglobulin 12/27/2023 <0.9     Microsomal -Tpo- Abs 12/27/2023 9.0     Prolactin 12/27/2023 11.30     25-Hydroxy   Vitamin D 25 12/27/2023 33     Insulin Fasting 12/27/2023 5     Glycohemoglobin 12/27/2023 5.5     Est Avg Glucose 12/27/2023 111     Stat C-Reactive Protein 12/27/2023 <0.30     Homocysteine 12/27/2023 8.22     Luteinizing Hormone 12/27/2023 22.0     Follicle Stimulating Hor* 12/27/2023 35.0     Testosterone,Total 12/27/2023 15     Sex Hormone Bind Globulin 12/27/2023 51     Testosterone Fr LCMS 12/27/2023 1.9     Estradiol-E2 12/27/2023 <5.0     Progesterone 12/27/2023 0.21     Estrone Serum 12/27/2023 21.7     Dhea-S 12/27/2023 79.4     Fasting Status 12/27/2023 Fasting     Pregnenolone 12/27/2023 113     GFR (CKD-EPI) 12/27/2023 93       Lab Results   Component Value Date/Time    HBA1C 5.5 12/27/2023 08:48 AM    HBA1C 5.2 11/02/2023 08:04 AM     Lab Results   Component Value Date/Time    SODIUM 141 12/27/2023 08:48 AM    POTASSIUM 4.4 12/27/2023 08:48 AM    CHLORIDE 105 12/27/2023 08:48 AM    CO2 25 12/27/2023 08:48 AM    GLUCOSE 95 12/27/2023  "08:48 AM    BUN 29 (H) 12/27/2023 08:48 AM    CREATININE 0.77 12/27/2023 08:48 AM    BUNCREATRAT 24 (H) 05/17/2023 05:59 AM    ALKPHOSPHAT 58 12/27/2023 08:48 AM    ASTSGOT 24 12/27/2023 08:48 AM    ALTSGPT 22 12/27/2023 08:48 AM    TBILIRUBIN 0.4 12/27/2023 08:48 AM     No results found for: \"INR\"  Lab Results   Component Value Date/Time    CHOLSTRLTOT 245 (H) 12/27/2023 08:48 AM     (H) 12/27/2023 08:48 AM    HDL 91 12/27/2023 08:48 AM    TRIGLYCERIDE 58 12/27/2023 08:48 AM       Lab Results   Component Value Date/Time    TESTOSTERONE 15 12/27/2023 08:48 AM     Lab Results   Component Value Date/Time    TSH 1.740 05/17/2023 05:59 AM    TSH 1.490 06/19/2020 06:34 AM     Lab Results   Component Value Date/Time    FREET4 1.14 12/27/2023 08:48 AM     Lab Results   Component Value Date/Time    URICACID 4.2 12/14/2023 08:30 AM     No components found for: \"VITB12\"  Lab Results   Component Value Date/Time    25HYDROXY 33 12/27/2023 08:48 AM    25HYDROXY 37 11/02/2023 08:04 AM     Lab Results   Component Value Date/Time    HBA1C 5.5 12/27/2023 08:48 AM    HBA1C 5.2 11/02/2023 08:04 AM     Lab Results   Component Value Date/Time    SODIUM 141 12/27/2023 08:48 AM    POTASSIUM 4.4 12/27/2023 08:48 AM    CHLORIDE 105 12/27/2023 08:48 AM    CO2 25 12/27/2023 08:48 AM    GLUCOSE 95 12/27/2023 08:48 AM    BUN 29 (H) 12/27/2023 08:48 AM    CREATININE 0.77 12/27/2023 08:48 AM    BUNCREATRAT 24 (H) 05/17/2023 05:59 AM    ALKPHOSPHAT 58 12/27/2023 08:48 AM    ASTSGOT 24 12/27/2023 08:48 AM    ALTSGPT 22 12/27/2023 08:48 AM    TBILIRUBIN 0.4 12/27/2023 08:48 AM     No results found for: \"INR\"  Lab Results   Component Value Date/Time    CHOLSTRLTOT 245 (H) 12/27/2023 08:48 AM     (H) 12/27/2023 08:48 AM    HDL 91 12/27/2023 08:48 AM    TRIGLYCERIDE 58 12/27/2023 08:48 AM       Lab Results   Component Value Date/Time    TESTOSTERONE 15 12/27/2023 08:48 AM     Lab Results   Component Value Date/Time    TSH 1.740 05/17/2023 " "05:59 AM    TSH 1.490 06/19/2020 06:34 AM     Lab Results   Component Value Date/Time    FREET4 1.14 12/27/2023 08:48 AM     Lab Results   Component Value Date/Time    URICACID 4.2 12/14/2023 08:30 AM     No components found for: \"VITB12\"  Lab Results   Component Value Date/Time    25HYDROXY 33 12/27/2023 08:48 AM    25HYDROXY 37 11/02/2023 08:04 AM                             Assessment & Plan        1. Chronic left hip pain- ellie- dr jenkins   Under good control. Continue same regimen.    - gabapentin (NEURONTIN) 100 MG Cap; Take 1 Capsule by mouth 3 times a day.  Dispense: 300 Capsule; Refill: 3  - Referral to Vascular Surgery    2. Left lower quadrant abdominal pain     - Referral to General Surgery  - Referral to Vascular Surgery  - US-ABDOMEN COMPLETE SURVEY; Future  - Comp Metabolic Panel; Future  - CBC WITH DIFFERENTIAL; Future  - Sed Rate; Future    3. Weight gain   Under good control. Continue same regimen.  - Semaglutide,0.25 or 0.5MG/DOS, (OZEMPIC, 0.25 OR 0.5 MG/DOSE,) 2 MG/3ML Solution Pen-injector; Inject 0.5 mg under the skin every 7 days.    4. Mass of left thigh  Unclear etiology.  Rule out occult tumor.  Ultrasound ordered  - US-EXTREMITY NON VASCULAR UNILATERAL LEFT; Future    5. Dyslipidemia   Under good control. Continue same regimen.  Mediterranean diet and exercise.  No need for many occasion such as a statin.  - Comp Metabolic Panel; Future  - CBC WITH DIFFERENTIAL; Future  - TSH; Future  - Lipid Profile; Future    6. Vitamin D deficiency      Under good control. Continue same regimen.  Vitamin D3 2000 units daily.  - VITAMIN D,25 HYDROXY (DEFICIENCY); Future    7. IFG (impaired fasting glucose)     - HEMOGLOBIN A1C; Future    8. Primary insomnia   Under good control. Continue same regimen.  - gabapentin (NEURONTIN) 100 MG Cap; Take 1 Capsule by mouth 3 times a day.  Dispense: 300 Capsule; Refill: 3                  "

## 2024-05-10 DIAGNOSIS — M62.830 MUSCLE SPASM OF BACK: ICD-10-CM

## 2024-05-13 RX ORDER — BACLOFEN 10 MG/1
TABLET ORAL
Qty: 100 TABLET | Refills: 0 | Status: SHIPPED | OUTPATIENT
Start: 2024-05-13

## 2024-05-16 ENCOUNTER — HOSPITAL ENCOUNTER (OUTPATIENT)
Dept: LAB | Facility: MEDICAL CENTER | Age: 54
End: 2024-05-16
Attending: INTERNAL MEDICINE
Payer: COMMERCIAL

## 2024-05-16 ENCOUNTER — HOSPITAL ENCOUNTER (OUTPATIENT)
Dept: LAB | Facility: MEDICAL CENTER | Age: 54
End: 2024-05-16
Attending: FAMILY MEDICINE
Payer: COMMERCIAL

## 2024-05-16 DIAGNOSIS — E78.5 DYSLIPIDEMIA: ICD-10-CM

## 2024-05-16 DIAGNOSIS — R73.01 IFG (IMPAIRED FASTING GLUCOSE): ICD-10-CM

## 2024-05-16 DIAGNOSIS — R10.32 LEFT LOWER QUADRANT ABDOMINAL PAIN: ICD-10-CM

## 2024-05-16 DIAGNOSIS — E55.9 VITAMIN D DEFICIENCY: ICD-10-CM

## 2024-05-16 LAB
25(OH)D3 SERPL-MCNC: 34 NG/ML (ref 30–100)
25(OH)D3 SERPL-MCNC: 35 NG/ML (ref 30–100)
ALBUMIN SERPL BCP-MCNC: 4.9 G/DL (ref 3.2–4.9)
ALBUMIN SERPL BCP-MCNC: 4.9 G/DL (ref 3.2–4.9)
ALBUMIN/GLOB SERPL: 2.3 G/DL
ALBUMIN/GLOB SERPL: 2.3 G/DL
ALP SERPL-CCNC: 62 U/L (ref 30–99)
ALP SERPL-CCNC: 62 U/L (ref 30–99)
ALT SERPL-CCNC: 23 U/L (ref 2–50)
ALT SERPL-CCNC: 23 U/L (ref 2–50)
ANION GAP SERPL CALC-SCNC: 14 MMOL/L (ref 7–16)
ANION GAP SERPL CALC-SCNC: 14 MMOL/L (ref 7–16)
AST SERPL-CCNC: 21 U/L (ref 12–45)
AST SERPL-CCNC: 21 U/L (ref 12–45)
BASOPHILS # BLD AUTO: 0.2 % (ref 0–1.8)
BASOPHILS # BLD AUTO: 0.3 % (ref 0–1.8)
BASOPHILS # BLD: 0.03 K/UL (ref 0–0.12)
BASOPHILS # BLD: 0.04 K/UL (ref 0–0.12)
BILIRUB SERPL-MCNC: 0.5 MG/DL (ref 0.1–1.5)
BILIRUB SERPL-MCNC: 0.5 MG/DL (ref 0.1–1.5)
BUN SERPL-MCNC: 21 MG/DL (ref 8–22)
BUN SERPL-MCNC: 22 MG/DL (ref 8–22)
CALCIUM ALBUM COR SERPL-MCNC: 8.7 MG/DL (ref 8.5–10.5)
CALCIUM ALBUM COR SERPL-MCNC: 8.8 MG/DL (ref 8.5–10.5)
CALCIUM SERPL-MCNC: 9.4 MG/DL (ref 8.5–10.5)
CALCIUM SERPL-MCNC: 9.5 MG/DL (ref 8.5–10.5)
CHLORIDE SERPL-SCNC: 103 MMOL/L (ref 96–112)
CHLORIDE SERPL-SCNC: 103 MMOL/L (ref 96–112)
CHOLEST SERPL-MCNC: 225 MG/DL (ref 100–199)
CHOLEST SERPL-MCNC: 229 MG/DL (ref 100–199)
CO2 SERPL-SCNC: 23 MMOL/L (ref 20–33)
CO2 SERPL-SCNC: 23 MMOL/L (ref 20–33)
CREAT SERPL-MCNC: 0.77 MG/DL (ref 0.5–1.4)
CREAT SERPL-MCNC: 0.79 MG/DL (ref 0.5–1.4)
CRP SERPL HS-MCNC: 1.6 MG/L (ref 0–3)
EOSINOPHIL # BLD AUTO: 0.01 K/UL (ref 0–0.51)
EOSINOPHIL # BLD AUTO: 0.01 K/UL (ref 0–0.51)
EOSINOPHIL NFR BLD: 0.1 % (ref 0–6.9)
EOSINOPHIL NFR BLD: 0.1 % (ref 0–6.9)
ERYTHROCYTE [DISTWIDTH] IN BLOOD BY AUTOMATED COUNT: 43.3 FL (ref 35.9–50)
ERYTHROCYTE [DISTWIDTH] IN BLOOD BY AUTOMATED COUNT: 43.6 FL (ref 35.9–50)
ERYTHROCYTE [SEDIMENTATION RATE] IN BLOOD BY WESTERGREN METHOD: 5 MM/HOUR (ref 0–25)
EST. AVERAGE GLUCOSE BLD GHB EST-MCNC: 105 MG/DL
EST. AVERAGE GLUCOSE BLD GHB EST-MCNC: 111 MG/DL
ESTRADIOL SERPL-MCNC: 84.6 PG/ML
FASTING STATUS PATIENT QL REPORTED: NORMAL
GFR SERPLBLD CREATININE-BSD FMLA CKD-EPI: 90 ML/MIN/1.73 M 2
GFR SERPLBLD CREATININE-BSD FMLA CKD-EPI: 93 ML/MIN/1.73 M 2
GLOBULIN SER CALC-MCNC: 2.1 G/DL (ref 1.9–3.5)
GLOBULIN SER CALC-MCNC: 2.1 G/DL (ref 1.9–3.5)
GLUCOSE SERPL-MCNC: 101 MG/DL (ref 65–99)
GLUCOSE SERPL-MCNC: 101 MG/DL (ref 65–99)
HBA1C MFR BLD: 5.3 % (ref 4–5.6)
HBA1C MFR BLD: 5.5 % (ref 4–5.6)
HCT VFR BLD AUTO: 43.5 % (ref 37–47)
HCT VFR BLD AUTO: 43.6 % (ref 37–47)
HCYS SERPL-SCNC: 6.84 UMOL/L
HDLC SERPL-MCNC: 101 MG/DL
HDLC SERPL-MCNC: 101 MG/DL
HGB BLD-MCNC: 14.8 G/DL (ref 12–16)
HGB BLD-MCNC: 14.9 G/DL (ref 12–16)
IMM GRANULOCYTES # BLD AUTO: 0.07 K/UL (ref 0–0.11)
IMM GRANULOCYTES # BLD AUTO: 0.08 K/UL (ref 0–0.11)
IMM GRANULOCYTES NFR BLD AUTO: 0.5 % (ref 0–0.9)
IMM GRANULOCYTES NFR BLD AUTO: 0.6 % (ref 0–0.9)
LDLC SERPL CALC-MCNC: 116 MG/DL
LDLC SERPL CALC-MCNC: 120 MG/DL
LYMPHOCYTES # BLD AUTO: 1.7 K/UL (ref 1–4.8)
LYMPHOCYTES # BLD AUTO: 1.76 K/UL (ref 1–4.8)
LYMPHOCYTES NFR BLD: 13 % (ref 22–41)
LYMPHOCYTES NFR BLD: 13.7 % (ref 22–41)
MCH RBC QN AUTO: 33 PG (ref 27–33)
MCH RBC QN AUTO: 33.3 PG (ref 27–33)
MCHC RBC AUTO-ENTMCNC: 34 G/DL (ref 32.2–35.5)
MCHC RBC AUTO-ENTMCNC: 34.2 G/DL (ref 32.2–35.5)
MCV RBC AUTO: 97.1 FL (ref 81.4–97.8)
MCV RBC AUTO: 97.3 FL (ref 81.4–97.8)
MONOCYTES # BLD AUTO: 1.18 K/UL (ref 0–0.85)
MONOCYTES # BLD AUTO: 1.26 K/UL (ref 0–0.85)
MONOCYTES NFR BLD AUTO: 9.2 % (ref 0–13.4)
MONOCYTES NFR BLD AUTO: 9.6 % (ref 0–13.4)
NEUTROPHILS # BLD AUTO: 10.03 K/UL (ref 1.82–7.42)
NEUTROPHILS # BLD AUTO: 9.78 K/UL (ref 1.82–7.42)
NEUTROPHILS NFR BLD: 76.3 % (ref 44–72)
NEUTROPHILS NFR BLD: 76.4 % (ref 44–72)
NRBC # BLD AUTO: 0 K/UL
NRBC # BLD AUTO: 0 K/UL
NRBC BLD-RTO: 0 /100 WBC (ref 0–0.2)
NRBC BLD-RTO: 0 /100 WBC (ref 0–0.2)
PLATELET # BLD AUTO: 314 K/UL (ref 164–446)
PLATELET # BLD AUTO: 315 K/UL (ref 164–446)
PMV BLD AUTO: 10.6 FL (ref 9–12.9)
PMV BLD AUTO: 10.7 FL (ref 9–12.9)
POTASSIUM SERPL-SCNC: 3.4 MMOL/L (ref 3.6–5.5)
POTASSIUM SERPL-SCNC: 3.4 MMOL/L (ref 3.6–5.5)
PROGEST SERPL-MCNC: 1.9 NG/ML
PROLACTIN SERPL-MCNC: 18.9 NG/ML (ref 2.8–26)
PROT SERPL-MCNC: 7 G/DL (ref 6–8.2)
PROT SERPL-MCNC: 7 G/DL (ref 6–8.2)
RBC # BLD AUTO: 4.48 M/UL (ref 4.2–5.4)
RBC # BLD AUTO: 4.48 M/UL (ref 4.2–5.4)
SODIUM SERPL-SCNC: 140 MMOL/L (ref 135–145)
SODIUM SERPL-SCNC: 140 MMOL/L (ref 135–145)
T3FREE SERPL-MCNC: 2.26 PG/ML (ref 2–4.4)
THYROPEROXIDASE AB SERPL-ACNC: 10 IU/ML (ref 0–9)
TRIGL SERPL-MCNC: 39 MG/DL (ref 0–149)
TRIGL SERPL-MCNC: 39 MG/DL (ref 0–149)
TSH SERPL DL<=0.005 MIU/L-ACNC: 0.57 UIU/ML (ref 0.38–5.33)
TSH SERPL DL<=0.005 MIU/L-ACNC: 0.58 UIU/ML (ref 0.38–5.33)
WBC # BLD AUTO: 12.8 K/UL (ref 4.8–10.8)
WBC # BLD AUTO: 13.1 K/UL (ref 4.8–10.8)

## 2024-05-18 LAB — INSULIN P FAST SERPL-ACNC: 15 UIU/ML (ref 3–25)

## 2024-05-19 LAB
IGF-I SERPL-MCNC: 187 NG/ML (ref 55–235)
IGF-I Z-SCORE SERPL: 1.1
PREG SERPL-MCNC: 26 NG/DL (ref 15–132)

## 2024-05-20 LAB — T3REVERSE SERPL-MCNC: 23.9 NG/DL (ref 9–27)

## 2024-05-21 ENCOUNTER — APPOINTMENT (OUTPATIENT)
Dept: RADIOLOGY | Facility: MEDICAL CENTER | Age: 54
End: 2024-05-21
Attending: INTERNAL MEDICINE
Payer: COMMERCIAL

## 2024-05-21 LAB
ESTRONE SERPL-MCNC: 30.9 PG/ML
SHBG SERPL-SCNC: 58 NMOL/L (ref 17–125)
TESTOST FREE SERPL-MCNC: 2.5 PG/ML (ref 1.1–5.8)
TESTOST SERPL-MCNC: 21 NG/DL (ref 9–55)

## 2024-05-24 ENCOUNTER — HOSPITAL ENCOUNTER (OUTPATIENT)
Dept: RADIOLOGY | Facility: MEDICAL CENTER | Age: 54
End: 2024-05-24
Attending: INTERNAL MEDICINE
Payer: COMMERCIAL

## 2024-05-24 DIAGNOSIS — R10.32 LEFT LOWER QUADRANT ABDOMINAL PAIN: ICD-10-CM

## 2024-05-29 ENCOUNTER — APPOINTMENT (OUTPATIENT)
Dept: MEDICAL GROUP | Age: 54
End: 2024-05-29
Payer: COMMERCIAL

## 2024-06-07 ENCOUNTER — APPOINTMENT (OUTPATIENT)
Dept: RADIOLOGY | Facility: MEDICAL CENTER | Age: 54
End: 2024-06-07
Attending: INTERNAL MEDICINE
Payer: COMMERCIAL

## 2024-06-09 ENCOUNTER — OFFICE VISIT (OUTPATIENT)
Dept: URGENT CARE | Facility: CLINIC | Age: 54
End: 2024-06-09
Payer: COMMERCIAL

## 2024-06-09 VITALS
OXYGEN SATURATION: 99 % | RESPIRATION RATE: 19 BRPM | BODY MASS INDEX: 24.01 KG/M2 | HEART RATE: 64 BPM | HEIGHT: 67 IN | WEIGHT: 153 LBS | TEMPERATURE: 97.7 F | SYSTOLIC BLOOD PRESSURE: 118 MMHG | DIASTOLIC BLOOD PRESSURE: 76 MMHG

## 2024-06-09 DIAGNOSIS — J45.40 MODERATE PERSISTENT ASTHMATIC BRONCHITIS WITHOUT COMPLICATION: ICD-10-CM

## 2024-06-09 PROCEDURE — 3074F SYST BP LT 130 MM HG: CPT | Performed by: FAMILY MEDICINE

## 2024-06-09 PROCEDURE — 3078F DIAST BP <80 MM HG: CPT | Performed by: FAMILY MEDICINE

## 2024-06-09 PROCEDURE — 99213 OFFICE O/P EST LOW 20 MIN: CPT | Performed by: FAMILY MEDICINE

## 2024-06-09 RX ORDER — AZITHROMYCIN 250 MG/1
TABLET, FILM COATED ORAL
Qty: 6 TABLET | Refills: 0 | Status: SHIPPED | OUTPATIENT
Start: 2024-06-09 | End: 2024-06-24

## 2024-06-09 ASSESSMENT — ENCOUNTER SYMPTOMS
SPUTUM PRODUCTION: 1
GASTROINTESTINAL NEGATIVE: 1
CONSTITUTIONAL NEGATIVE: 1
COUGH: 1
CARDIOVASCULAR NEGATIVE: 1
EYES NEGATIVE: 1

## 2024-06-09 ASSESSMENT — FIBROSIS 4 INDEX: FIB4 SCORE: 0.71

## 2024-06-09 NOTE — PROGRESS NOTES
"Subjective:   Odalys Ballard is a 53 y.o. female who presents for Bronchitis (2 weeks ), Congestion (2 weeks), and Cough (2 weeks )      Cough        Review of Systems   Constitutional: Negative.    HENT: Negative.     Eyes: Negative.    Respiratory:  Positive for cough and sputum production.    Cardiovascular: Negative.    Gastrointestinal: Negative.    Skin: Negative.        Medications, Allergies, and current problem list reviewed today in Epic.     Objective:     /76   Pulse 64   Temp 36.5 °C (97.7 °F) (Temporal)   Resp 19   Ht 1.702 m (5' 7\")   Wt 69.4 kg (153 lb)   SpO2 99%     Physical Exam  Vitals and nursing note reviewed.   Constitutional:       Appearance: Normal appearance.   HENT:      Head: Normocephalic and atraumatic.      Right Ear: Tympanic membrane normal.      Left Ear: Tympanic membrane normal.      Nose: Nose normal.      Mouth/Throat:      Pharynx: Oropharynx is clear.   Cardiovascular:      Rate and Rhythm: Normal rate and regular rhythm.      Pulses: Normal pulses.      Heart sounds: Normal heart sounds.   Pulmonary:      Breath sounds: Rhonchi present. No wheezing.   Abdominal:      General: Abdomen is flat. Bowel sounds are normal.      Palpations: Abdomen is soft.   Musculoskeletal:      Cervical back: Normal range of motion and neck supple.   Neurological:      Mental Status: She is alert.         Assessment/Plan:     Diagnosis and associated orders:     1. Moderate persistent asthmatic bronchitis without complication  azithromycin (ZITHROMAX) 250 MG Tab         Comments/MDM:              Differential diagnosis, natural history, supportive care, and indications for immediate follow-up discussed.    Advised the patient to follow-up with the primary care physician for recheck, reevaluation, and consideration of further management.    Please note that this dictation was created using voice recognition software. I have made a reasonable attempt to correct obvious errors, but I " expect that there are errors of grammar and possibly content that I did not discover before finalizing the note.    This note was electronically signed by Mendez Mccurdy M.D.

## 2024-06-10 DIAGNOSIS — M62.830 MUSCLE SPASM OF BACK: ICD-10-CM

## 2024-06-10 RX ORDER — BACLOFEN 10 MG/1
TABLET ORAL
Qty: 100 TABLET | Refills: 0 | Status: SHIPPED | OUTPATIENT
Start: 2024-06-10

## 2024-06-13 ENCOUNTER — HOSPITAL ENCOUNTER (OUTPATIENT)
Dept: RADIOLOGY | Facility: MEDICAL CENTER | Age: 54
End: 2024-06-13
Attending: FAMILY MEDICINE
Payer: COMMERCIAL

## 2024-06-13 DIAGNOSIS — M25.552 LEFT HIP PAIN: ICD-10-CM

## 2024-06-13 PROCEDURE — 73722 MRI JOINT OF LWR EXTR W/DYE: CPT | Mod: LT

## 2024-06-13 PROCEDURE — 27093 INJECTION FOR HIP X-RAY: CPT | Mod: LT

## 2024-06-13 RX ORDER — TRIAMCINOLONE ACETONIDE 40 MG/ML
40 INJECTION, SUSPENSION INTRA-ARTICULAR; INTRAMUSCULAR ONCE
Status: DISCONTINUED | OUTPATIENT
Start: 2024-06-13 | End: 2024-06-14 | Stop reason: HOSPADM

## 2024-06-24 ENCOUNTER — OFFICE VISIT (OUTPATIENT)
Dept: SLEEP MEDICINE | Facility: MEDICAL CENTER | Age: 54
End: 2024-06-24
Attending: INTERNAL MEDICINE
Payer: COMMERCIAL

## 2024-06-24 VITALS
HEART RATE: 117 BPM | BODY MASS INDEX: 23.23 KG/M2 | DIASTOLIC BLOOD PRESSURE: 82 MMHG | SYSTOLIC BLOOD PRESSURE: 108 MMHG | HEIGHT: 67 IN | WEIGHT: 148 LBS | OXYGEN SATURATION: 97 %

## 2024-06-24 DIAGNOSIS — Z78.9 TRAVEL WITHIN LAST 14 DAYS: ICD-10-CM

## 2024-06-24 DIAGNOSIS — B99.9 RECURRENT INFECTIONS: ICD-10-CM

## 2024-06-24 DIAGNOSIS — R13.13 PHARYNGEAL DYSPHAGIA: ICD-10-CM

## 2024-06-24 DIAGNOSIS — G89.29 CHRONIC LEFT HIP PAIN: ICD-10-CM

## 2024-06-24 DIAGNOSIS — M84.377G: ICD-10-CM

## 2024-06-24 DIAGNOSIS — J32.9 RECURRENT SINUS INFECTIONS: ICD-10-CM

## 2024-06-24 DIAGNOSIS — M25.552 CHRONIC LEFT HIP PAIN: ICD-10-CM

## 2024-06-24 PROCEDURE — 99213 OFFICE O/P EST LOW 20 MIN: CPT | Performed by: INTERNAL MEDICINE

## 2024-06-24 PROCEDURE — 99214 OFFICE O/P EST MOD 30 MIN: CPT | Performed by: INTERNAL MEDICINE

## 2024-06-24 PROCEDURE — 3079F DIAST BP 80-89 MM HG: CPT | Performed by: INTERNAL MEDICINE

## 2024-06-24 PROCEDURE — 3074F SYST BP LT 130 MM HG: CPT | Performed by: INTERNAL MEDICINE

## 2024-06-24 RX ORDER — AMOXICILLIN AND CLAVULANATE POTASSIUM 875; 125 MG/1; MG/1
1 TABLET, FILM COATED ORAL 2 TIMES DAILY WITH MEALS
Qty: 28 TABLET | Refills: 0 | Status: SHIPPED | OUTPATIENT
Start: 2024-06-24

## 2024-06-24 RX ORDER — AZITHROMYCIN 250 MG/1
TABLET, FILM COATED ORAL
Qty: 6 TABLET | Refills: 0 | Status: SHIPPED | OUTPATIENT
Start: 2024-06-24 | End: 2024-07-14

## 2024-06-24 ASSESSMENT — FIBROSIS 4 INDEX: FIB4 SCORE: 0.71
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
## 2024-06-25 ENCOUNTER — HOSPITAL ENCOUNTER (OUTPATIENT)
Facility: MEDICAL CENTER | Age: 54
End: 2024-06-25
Attending: NURSE PRACTITIONER
Payer: COMMERCIAL

## 2024-06-25 ENCOUNTER — HOSPITAL ENCOUNTER (OUTPATIENT)
Dept: LAB | Facility: MEDICAL CENTER | Age: 54
End: 2024-06-25
Attending: INTERNAL MEDICINE
Payer: COMMERCIAL

## 2024-06-25 ENCOUNTER — APPOINTMENT (OUTPATIENT)
Dept: MEDICAL GROUP | Age: 54
End: 2024-06-25
Payer: COMMERCIAL

## 2024-06-25 DIAGNOSIS — M84.377G: ICD-10-CM

## 2024-06-25 DIAGNOSIS — B99.9 RECURRENT INFECTIONS: ICD-10-CM

## 2024-06-25 LAB
BASOPHILS # BLD AUTO: 1 % (ref 0–1.8)
BASOPHILS # BLD: 0.06 K/UL (ref 0–0.12)
CA-I SERPL-SCNC: 1.2 MMOL/L (ref 1.1–1.3)
EOSINOPHIL # BLD AUTO: 0.05 K/UL (ref 0–0.51)
EOSINOPHIL NFR BLD: 0.8 % (ref 0–6.9)
ERYTHROCYTE [DISTWIDTH] IN BLOOD BY AUTOMATED COUNT: 41.9 FL (ref 35.9–50)
HCT VFR BLD AUTO: 47 % (ref 37–47)
HGB BLD-MCNC: 15.8 G/DL (ref 12–16)
IMM GRANULOCYTES # BLD AUTO: 0.02 K/UL (ref 0–0.11)
IMM GRANULOCYTES NFR BLD AUTO: 0.3 % (ref 0–0.9)
LYMPHOCYTES # BLD AUTO: 1.53 K/UL (ref 1–4.8)
LYMPHOCYTES NFR BLD: 25.8 % (ref 22–41)
MCH RBC QN AUTO: 33 PG (ref 27–33)
MCHC RBC AUTO-ENTMCNC: 33.6 G/DL (ref 32.2–35.5)
MCV RBC AUTO: 98.1 FL (ref 81.4–97.8)
MONOCYTES # BLD AUTO: 0.54 K/UL (ref 0–0.85)
MONOCYTES NFR BLD AUTO: 9.1 % (ref 0–13.4)
NEUTROPHILS # BLD AUTO: 3.74 K/UL (ref 1.82–7.42)
NEUTROPHILS NFR BLD: 63 % (ref 44–72)
NRBC # BLD AUTO: 0 K/UL
NRBC BLD-RTO: 0 /100 WBC (ref 0–0.2)
PLATELET # BLD AUTO: 319 K/UL (ref 164–446)
PMV BLD AUTO: 10.5 FL (ref 9–12.9)
RBC # BLD AUTO: 4.79 M/UL (ref 4.2–5.4)
WBC # BLD AUTO: 5.9 K/UL (ref 4.8–10.8)

## 2024-06-25 PROCEDURE — 82941 ASSAY OF GASTRIN: CPT

## 2024-06-25 PROCEDURE — 83993 ASSAY FOR CALPROTECTIN FECAL: CPT

## 2024-06-25 PROCEDURE — 82784 ASSAY IGA/IGD/IGG/IGM EACH: CPT

## 2024-06-25 PROCEDURE — 82653 EL-1 FECAL QUANTITATIVE: CPT

## 2024-06-25 PROCEDURE — 82652 VIT D 1 25-DIHYDROXY: CPT

## 2024-06-25 PROCEDURE — 82785 ASSAY OF IGE: CPT

## 2024-06-25 PROCEDURE — 36415 COLL VENOUS BLD VENIPUNCTURE: CPT

## 2024-06-25 PROCEDURE — 84586 ASSAY OF VIP: CPT

## 2024-06-25 PROCEDURE — 85025 COMPLETE CBC W/AUTO DIFF WBC: CPT

## 2024-06-25 PROCEDURE — 82105 ALPHA-FETOPROTEIN SERUM: CPT

## 2024-06-25 PROCEDURE — 82330 ASSAY OF CALCIUM: CPT

## 2024-06-26 ENCOUNTER — HOSPITAL ENCOUNTER (OUTPATIENT)
Dept: RADIOLOGY | Facility: MEDICAL CENTER | Age: 54
End: 2024-06-26
Attending: NURSE PRACTITIONER
Payer: COMMERCIAL

## 2024-06-26 DIAGNOSIS — K86.2 CYST AND PSEUDOCYST OF PANCREAS: ICD-10-CM

## 2024-06-26 DIAGNOSIS — J91.8 PLEURAL EFFUSION ASSOCIATED WITH HEPATIC DISORDER: ICD-10-CM

## 2024-06-26 DIAGNOSIS — K76.9 PLEURAL EFFUSION ASSOCIATED WITH HEPATIC DISORDER: ICD-10-CM

## 2024-06-26 DIAGNOSIS — K86.3 CYST AND PSEUDOCYST OF PANCREAS: ICD-10-CM

## 2024-06-26 PROCEDURE — 700117 HCHG RX CONTRAST REV CODE 255: Performed by: NURSE PRACTITIONER

## 2024-06-26 PROCEDURE — 74177 CT ABD & PELVIS W/CONTRAST: CPT

## 2024-06-26 RX ADMIN — IOHEXOL 100 ML: 350 INJECTION, SOLUTION INTRAVENOUS at 17:56

## 2024-06-27 LAB
1,25(OH)2D3 SERPL-MCNC: 52.2 PG/ML (ref 19.9–79.3)
AFP-TM SERPL-MCNC: 10 NG/ML (ref 0–9)
GASTRIN SERPL-MCNC: 101 PG/ML (ref 0–100)

## 2024-06-28 LAB
CALPROTECTIN STL-MCNT: 57 UG/G
ELASTASE PANC STL-MCNT: >800 UG/G
IGA SERPL-MCNC: 118 MG/DL (ref 68–408)
IGE SERPL-ACNC: 8 KU/L
IGG SERPL-MCNC: 672 MG/DL (ref 768–1632)
IGM SERPL-MCNC: 97 MG/DL (ref 35–263)

## 2024-07-01 LAB — VIP SERPL-MCNC: <13 PG/ML (ref 0–60)

## 2024-07-03 PROBLEM — M84.377A: Status: ACTIVE | Noted: 2024-07-03

## 2024-07-03 PROBLEM — R13.13 PHARYNGEAL DYSPHAGIA: Status: ACTIVE | Noted: 2024-07-03

## 2024-07-04 NOTE — ASSESSMENT & PLAN NOTE
She has bouts of coughing and at times vomiting  Discomfort on the left side  This has not resolved for over a year

## 2024-07-04 NOTE — ASSESSMENT & PLAN NOTE
Sinus and pulmonary  There is a correlation it started after RSV infection she had March 2022  She has reduced IGG and repeat also reduced  She is having her allergies treated but does not seem to be helping her cough  Her sinusitis is reoccuring every few months

## 2024-07-04 NOTE — ASSESSMENT & PLAN NOTE
Chronic left hip pain, cerivcal radiculopathy at c6 and now has a stress fracture on her third right toe and also broken rib post RSV infection  She had an MRI done of her spine and multiple abnormalities   Unclear what to make of it  Pt does not have osteoporosis   Will check ionized ca and repeat Vitamin D

## 2024-07-09 DIAGNOSIS — M62.830 MUSCLE SPASM OF BACK: ICD-10-CM

## 2024-07-09 RX ORDER — BACLOFEN 10 MG/1
TABLET ORAL
Qty: 100 TABLET | Refills: 0 | Status: SHIPPED | OUTPATIENT
Start: 2024-07-09

## 2024-07-14 ENCOUNTER — OFFICE VISIT (OUTPATIENT)
Dept: URGENT CARE | Facility: CLINIC | Age: 54
End: 2024-07-14
Payer: COMMERCIAL

## 2024-07-14 ENCOUNTER — APPOINTMENT (OUTPATIENT)
Dept: RADIOLOGY | Facility: IMAGING CENTER | Age: 54
End: 2024-07-14
Attending: PHYSICIAN ASSISTANT
Payer: COMMERCIAL

## 2024-07-14 ENCOUNTER — TELEPHONE (OUTPATIENT)
Dept: SLEEP MEDICINE | Facility: MEDICAL CENTER | Age: 54
End: 2024-07-14
Payer: COMMERCIAL

## 2024-07-14 VITALS
RESPIRATION RATE: 14 BRPM | TEMPERATURE: 96.9 F | WEIGHT: 148 LBS | SYSTOLIC BLOOD PRESSURE: 154 MMHG | HEART RATE: 98 BPM | BODY MASS INDEX: 25.27 KG/M2 | OXYGEN SATURATION: 98 % | HEIGHT: 64 IN | DIASTOLIC BLOOD PRESSURE: 100 MMHG

## 2024-07-14 DIAGNOSIS — R05.9 COUGH, UNSPECIFIED TYPE: ICD-10-CM

## 2024-07-14 DIAGNOSIS — U07.1 COVID-19 VIRUS INFECTION: ICD-10-CM

## 2024-07-14 DIAGNOSIS — U07.1 COVID: ICD-10-CM

## 2024-07-14 DIAGNOSIS — I10 WHITE COAT SYNDROME WITH DIAGNOSIS OF HYPERTENSION: ICD-10-CM

## 2024-07-14 DIAGNOSIS — U07.1 COVID-19 VIRUS INFECTION: Primary | ICD-10-CM

## 2024-07-14 DIAGNOSIS — J45.21 MILD INTERMITTENT ASTHMA WITH ACUTE EXACERBATION: ICD-10-CM

## 2024-07-14 PROCEDURE — 71046 X-RAY EXAM CHEST 2 VIEWS: CPT | Mod: TC | Performed by: RADIOLOGY

## 2024-07-14 PROCEDURE — 3077F SYST BP >= 140 MM HG: CPT | Performed by: PHYSICIAN ASSISTANT

## 2024-07-14 PROCEDURE — 3080F DIAST BP >= 90 MM HG: CPT | Performed by: PHYSICIAN ASSISTANT

## 2024-07-14 PROCEDURE — 99214 OFFICE O/P EST MOD 30 MIN: CPT | Performed by: PHYSICIAN ASSISTANT

## 2024-07-14 RX ORDER — AZITHROMYCIN 250 MG/1
TABLET, FILM COATED ORAL
Qty: 6 TABLET | Refills: 0 | Status: SHIPPED | OUTPATIENT
Start: 2024-07-14

## 2024-07-14 RX ORDER — PREDNISONE 20 MG/1
20 TABLET ORAL 2 TIMES DAILY
Qty: 10 TABLET | Refills: 0 | Status: SHIPPED | OUTPATIENT
Start: 2024-07-14 | End: 2024-07-19

## 2024-07-14 RX ORDER — NIRMATRELVIR AND RITONAVIR 300-100 MG
KIT ORAL
Qty: 1 EACH | Refills: 0 | Status: SHIPPED | OUTPATIENT
Start: 2024-07-14

## 2024-07-14 ASSESSMENT — ENCOUNTER SYMPTOMS
CHILLS: 0
SPUTUM PRODUCTION: 1
FEVER: 1
WHEEZING: 1
COUGH: 1
STRIDOR: 0

## 2024-07-14 ASSESSMENT — COPD QUESTIONNAIRES: COPD: 0

## 2024-07-14 ASSESSMENT — FIBROSIS 4 INDEX: FIB4 SCORE: 0.7

## 2024-07-15 ENCOUNTER — APPOINTMENT (OUTPATIENT)
Dept: RADIOLOGY | Facility: MEDICAL CENTER | Age: 54
End: 2024-07-15
Attending: NURSE PRACTITIONER
Payer: COMMERCIAL

## 2024-07-25 DIAGNOSIS — G89.29 CHRONIC LEFT-SIDED LOW BACK PAIN WITH LEFT-SIDED SCIATICA: ICD-10-CM

## 2024-07-25 DIAGNOSIS — I77.74 DISSECTION OF VERTEBRAL ARTERY (HCC): ICD-10-CM

## 2024-07-25 DIAGNOSIS — R13.13 PHARYNGEAL DYSPHAGIA: ICD-10-CM

## 2024-07-25 DIAGNOSIS — J45.30 MILD PERSISTENT ASTHMA WITHOUT COMPLICATION: ICD-10-CM

## 2024-07-25 DIAGNOSIS — M54.42 CHRONIC LEFT-SIDED LOW BACK PAIN WITH LEFT-SIDED SCIATICA: ICD-10-CM

## 2024-07-25 DIAGNOSIS — M25.552 CHRONIC LEFT HIP PAIN: ICD-10-CM

## 2024-07-25 DIAGNOSIS — Z84.81 FAMILY HISTORY OF BREAST CANCER GENE MUTATION IN FIRST DEGREE RELATIVE: ICD-10-CM

## 2024-07-25 DIAGNOSIS — M84.377D STRESS FRACTURE OF PHALANX OF TOE OF RIGHT FOOT WITH ROUTINE HEALING, SUBSEQUENT ENCOUNTER: ICD-10-CM

## 2024-07-25 DIAGNOSIS — G89.29 CHRONIC LEFT HIP PAIN: ICD-10-CM

## 2024-07-25 DIAGNOSIS — M54.12 CERVICAL RADICULOPATHY AT C6: ICD-10-CM

## 2024-07-25 DIAGNOSIS — B99.9 RECURRENT INFECTIONS: ICD-10-CM

## 2024-08-05 DIAGNOSIS — F51.01 PRIMARY INSOMNIA: ICD-10-CM

## 2024-08-05 RX ORDER — ESZOPICLONE 3 MG/1
3 TABLET, FILM COATED ORAL NIGHTLY PRN
Qty: 90 TABLET | Refills: 0 | Status: SHIPPED | OUTPATIENT
Start: 2024-08-05 | End: 2024-11-03

## 2024-08-06 ENCOUNTER — APPOINTMENT (OUTPATIENT)
Dept: MEDICAL GROUP | Age: 54
End: 2024-08-06
Payer: COMMERCIAL

## 2024-08-07 ENCOUNTER — OFFICE VISIT (OUTPATIENT)
Dept: MEDICAL GROUP | Age: 54
End: 2024-08-07
Payer: COMMERCIAL

## 2024-08-07 ENCOUNTER — HOSPITAL ENCOUNTER (OUTPATIENT)
Dept: LAB | Facility: MEDICAL CENTER | Age: 54
End: 2024-08-07
Attending: OTOLARYNGOLOGY
Payer: COMMERCIAL

## 2024-08-07 VITALS
WEIGHT: 155 LBS | TEMPERATURE: 98.1 F | OXYGEN SATURATION: 97 % | HEIGHT: 67 IN | DIASTOLIC BLOOD PRESSURE: 70 MMHG | BODY MASS INDEX: 24.33 KG/M2 | RESPIRATION RATE: 16 BRPM | SYSTOLIC BLOOD PRESSURE: 142 MMHG | HEART RATE: 111 BPM

## 2024-08-07 DIAGNOSIS — G43.001 MIGRAINE WITHOUT AURA AND WITH STATUS MIGRAINOSUS, NOT INTRACTABLE: ICD-10-CM

## 2024-08-07 DIAGNOSIS — G89.29 CHRONIC LEFT-SIDED LOW BACK PAIN WITH LEFT-SIDED SCIATICA: ICD-10-CM

## 2024-08-07 DIAGNOSIS — R73.01 IFG (IMPAIRED FASTING GLUCOSE): ICD-10-CM

## 2024-08-07 DIAGNOSIS — J45.30 MILD PERSISTENT ASTHMA WITHOUT COMPLICATION: ICD-10-CM

## 2024-08-07 DIAGNOSIS — M54.42 CHRONIC LEFT-SIDED LOW BACK PAIN WITH LEFT-SIDED SCIATICA: ICD-10-CM

## 2024-08-07 DIAGNOSIS — F51.01 PRIMARY INSOMNIA: ICD-10-CM

## 2024-08-07 DIAGNOSIS — I10 ESSENTIAL HYPERTENSION: ICD-10-CM

## 2024-08-07 DIAGNOSIS — E55.9 VITAMIN D DEFICIENCY: ICD-10-CM

## 2024-08-07 DIAGNOSIS — M67.479 GANGLION CYST OF FOOT: ICD-10-CM

## 2024-08-07 DIAGNOSIS — K76.0 FATTY LIVER: ICD-10-CM

## 2024-08-07 DIAGNOSIS — E78.5 DYSLIPIDEMIA: ICD-10-CM

## 2024-08-07 DIAGNOSIS — I77.74 DISSECTION OF VERTEBRAL ARTERY (HCC): ICD-10-CM

## 2024-08-07 DIAGNOSIS — K20.0 EOSINOPHILIC ESOPHAGITIS: ICD-10-CM

## 2024-08-07 PROCEDURE — 82784 ASSAY IGA/IGD/IGG/IGM EACH: CPT

## 2024-08-07 PROCEDURE — 86008 ALLG SPEC IGE RECOMB EA: CPT

## 2024-08-07 PROCEDURE — 82785 ASSAY OF IGE: CPT

## 2024-08-07 PROCEDURE — 99214 OFFICE O/P EST MOD 30 MIN: CPT | Performed by: INTERNAL MEDICINE

## 2024-08-07 PROCEDURE — 3078F DIAST BP <80 MM HG: CPT | Performed by: INTERNAL MEDICINE

## 2024-08-07 PROCEDURE — 82941 ASSAY OF GASTRIN: CPT

## 2024-08-07 PROCEDURE — 86003 ALLG SPEC IGE CRUDE XTRC EA: CPT | Mod: 91

## 2024-08-07 PROCEDURE — 82787 IGG 1 2 3 OR 4 EACH: CPT

## 2024-08-07 PROCEDURE — 36415 COLL VENOUS BLD VENIPUNCTURE: CPT

## 2024-08-07 PROCEDURE — 3077F SYST BP >= 140 MM HG: CPT | Performed by: INTERNAL MEDICINE

## 2024-08-07 ASSESSMENT — ENCOUNTER SYMPTOMS
NECK PAIN: 1
BACK PAIN: 1
RESPIRATORY NEGATIVE: 1
GASTROINTESTINAL NEGATIVE: 1
MYALGIAS: 1
INSOMNIA: 1
NEUROLOGICAL NEGATIVE: 1
NERVOUS/ANXIOUS: 1
EYES NEGATIVE: 1
CARDIOVASCULAR NEGATIVE: 1

## 2024-08-07 ASSESSMENT — FIBROSIS 4 INDEX: FIB4 SCORE: 0.7

## 2024-08-07 NOTE — PROGRESS NOTES
Subjective     Odalys Ballard is a 53 y.o. female who presents with Follow-Up (From the last visit  to go over imaging results )  Patient presents with multiple questions and testing orders in results review that require at least 40 of time to review and discuss.  Most of these were reassured and were nonsignificant.  However we did discuss fatty liver and the importance of Mediterranean diet and close surveillance of this so it does not progress to cirrhosis over time.  Follow-up with GI was encouraged.  She does have eosinophilic esophagitis and sees GI regularly.    Also concerned about seeing a vascular medicine doctor for further surveillance of her history of vertebral aortic dissection which I think is reasonable.  Referral to vascular medicine done she will surveilled all arterial circulation issues potentially.    She is quite concerned that her left-sided body aches are related to vascular disorder and second opinion regarding this is important for reassurance.    Her migraines are under good control with current regimen of as needed Maxalt but no prophylactic therapy as they are not severe enough or frequent off to warrant that at this time.  This may change over time pending her frequency of episodes which we can discuss at future visit or pend a referral to neurology for further management.    Patient also had RSV last year which was quite severe and she recovered from and is now gotten the RSV vaccine.  She is up-to-date on all other vaccines.  She did suffer a COVID event a month ago complicated by marked wheezing and possible secondary bacterial infection for which she was provided Paxlovid Z-Satya and Medrol Dosepak and recovered uneventfully and is no longer short of breath or wheezing.    Her home BPs are well-controlled on current regimen of hydrochlorothiazide 25 mg a day.  No BP readings greater than 130 at home systolic.    Has a ganglion cyst on dorsum of right foot for which she will see  orthopedic or podiatry surgeon.  It is quite symptomatic and needs to be removed.          HPI    Review of Systems   Constitutional:  Positive for malaise/fatigue.   HENT: Negative.     Eyes: Negative.    Respiratory: Negative.     Cardiovascular: Negative.    Gastrointestinal: Negative.    Genitourinary: Negative.    Musculoskeletal:  Positive for back pain, joint pain, myalgias and neck pain.   Skin: Negative.    Neurological: Negative.    Endo/Heme/Allergies: Negative.    Psychiatric/Behavioral:  The patient is nervous/anxious and has insomnia.      History of Present Illness  The patient presents for evaluation of multiple medical concerns.    She recently recovered from COVID-19 and was prescribed Paxlovid, Z-Satya, and steroids. She also had a severe RSV infection 2.5 years ago. After a cruise, she contracted COVID-19, which exacerbated her asthma. She also suffers from severe allergies and receives allergy injections.    She has been experiencing issues with her left leg, left hip, left back, and left eyebrow since her RSV infection. Her stress fracture has healed, as confirmed by x-rays. She has a ganglion cyst on her foot that is causing discomfort and is rubbing against her shoes. She plans to consult an orthopedic surgeon at [facility] and undergo an ultrasound. She has been seeing a massage therapist for cupping and scar tissue treatment. She has experienced swelling in her left arm and leg. She has a history of disc injuries 25 years ago. An MRI of her entire spine revealed severe changes. An MRI of her left hip revealed a labral tear and arthritis, and she was informed that she is not eligible for arthroscopic cleanup due to her age. She is eager to return to weight training, which she has not been able to do for the past 2.5 years due to pain in her left hip and back.    She has stopped taking semaglutide due to stomach problems. A CT scan revealed liver and pancreas lesions, and she is under the care of a  "gastroenterologist. An abdominal ultrasound revealed focal fatty infiltration.    Supplemental Information  She takes Maxalt for left-sided headaches near her ear and temples. The swelling gets worse with weights, flying, or lying down. She has dissected vertebral artery in her neck. She has benign bone tumors, which were not present 2.5 years ago.    FAMILY HISTORY  She has a family history of cancer. Her father had neuroendocrine tumors.    IMMUNIZATIONS  She has received RSV vaccine.               Objective     BP (!) 142/70 (BP Location: Right arm, Patient Position: Sitting, BP Cuff Size: Adult)   Pulse (!) 111   Temp 36.7 °C (98.1 °F) (Temporal)   Resp 16   Ht 1.702 m (5' 7\")   Wt 70.3 kg (155 lb)   LMP 10/08/2016   SpO2 97%   BMI 24.28 kg/m²      Physical Exam  Vitals reviewed.   Constitutional:       General: She is not in acute distress.     Appearance: She is well-developed. She is not diaphoretic.   HENT:      Head: Normocephalic and atraumatic.      Right Ear: External ear normal.      Left Ear: External ear normal.      Nose: Nose normal.      Mouth/Throat:      Pharynx: No oropharyngeal exudate.   Eyes:      General: No scleral icterus.        Right eye: No discharge.         Left eye: No discharge.      Conjunctiva/sclera: Conjunctivae normal.      Pupils: Pupils are equal, round, and reactive to light.   Neck:      Thyroid: No thyromegaly.      Vascular: No JVD.      Trachea: No tracheal deviation.   Cardiovascular:      Rate and Rhythm: Normal rate and regular rhythm.      Heart sounds: Normal heart sounds. No murmur heard.     No friction rub. No gallop.      Comments: No murmur gallop rub or JVD  Pulmonary:      Effort: Pulmonary effort is normal. No respiratory distress.      Breath sounds: Normal breath sounds. No stridor. No wheezing, rhonchi or rales.      Comments: No wheezes or rhonchi  Chest:      Chest wall: No tenderness.   Abdominal:      General: Bowel sounds are normal. There is " no distension.      Palpations: Abdomen is soft. There is no mass.      Tenderness: There is no abdominal tenderness. There is no guarding or rebound.   Musculoskeletal:         General: Swelling and tenderness present. Normal range of motion.      Cervical back: Normal range of motion and neck supple.      Comments: 2 mm ganglion cyst over the dorsum of the right foot at the first MTP joint..  There is no synovial joint thickening swelling or erythema or any other detectable abnormalities on physical exam of all her joints.   Lymphadenopathy:      Cervical: No cervical adenopathy.   Skin:     General: Skin is warm and dry.      Coloration: Skin is not pale.      Findings: No erythema or rash.   Neurological:      Mental Status: She is alert and oriented to person, place, and time.      Cranial Nerves: No cranial nerve deficit.      Motor: No abnormal muscle tone.      Coordination: Coordination normal.      Deep Tendon Reflexes: Reflexes are normal and symmetric. Reflexes normal.   Psychiatric:         Behavior: Behavior normal.         Thought Content: Thought content normal.         Judgment: Judgment normal.                             Assessment & Plan        1. 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 Saldana           See discussion above.  - Referral to Vascular Medicine    2. Chronic left-sided low back pain with left-sided sciatica  See discussion above.    3. Ganglion cyst of foot-right dorsal  Referral to orthopedic foot surgeon or podiatrist.  Already scheduled    4. Mild persistent asthma without complication  This is under good control.  Her asthmatic bronchitis from COVID-19 infection last month has completely resolved with treatment of Paxlovid Z-Satya and Medrol Dosepak.    5. Primary insomnia  Controlled continue current regimen Lunesta ordered yesterday.  Continue unchanged    6. Vitamin D deficiency  Good control continue vitamin D3 2000 units daily.    7. Essential  hypertension      Controlled continue HCTZ 25 mg daily.  8. Dyslipidemia  Good control continue monitoring diet and exercise.    9. Eosinophilic esophagitis  Continue continue with H2 blockers and/or PPIs.  Currently on Protonix 40 mg daily which she will stay on..  Follow-up with GI.    10. IFG (impaired fasting glucose)  Monitoring diet and exercise.  Stable at this time    11. Migraine without aura and with status migrainosus, not intractable  Control and as needed Maxalt.  Consider Aimovig or other prophylactic measures in the future if increased frequency of migraines occur.    12. Fatty liver  Continue surveillance.  Follow-up with GI.  No treatment at this time.  Other than between diet and exercise and weight reduction.

## 2024-08-09 ENCOUNTER — TELEPHONE (OUTPATIENT)
Dept: HEALTH INFORMATION MANAGEMENT | Facility: OTHER | Age: 54
End: 2024-08-09
Payer: COMMERCIAL

## 2024-08-09 LAB
APRICOT IGE QN: <0.1 KU/L
AVOCADO IGE QN: <0.1 KU/L
BANANA IGE QN: <0.1 KU/L
BEET IGE QN: <0.1 KU/L
BLACKBERRY IGE QN: <0.1 KU/L
BOG CRANBERRY IGE AB [UNITS/VOLUME] IN SERUM: <0.1 KU/L
CHERRY IGE QN: <0.1 KU/L
CHESTNUT IGE QN: <0.1 KU/L
DEPRECATED MISC ALLERGEN IGE RAST QL: NORMAL
DEPRECATED MISC ALLERGEN IGE RAST QL: NORMAL
GRAPE IGE QN: <0.1 KU/L
HAZELNUT IGE QN: <0.1 KU/L
IGA SERPL-MCNC: 113 MG/DL (ref 68–408)
IGE SERPL-ACNC: 9 KU/L
IGG4 SER-MCNC: 45 MG/DL (ref 1–123)
KIWIFRUIT IGE QN: <0.1 KU/L
MUSTARD IGE QN: <0.1 KU/L
PAPAYA IGE QN: <0.1 KU/L
PEACH IGE QN: <0.1 KU/L
STRAWBERRY IGE QN: <0.1 KU/L
TEST NAME 95000: NORMAL

## 2024-08-10 LAB — GASTRIN SERPL-MCNC: 116 PG/ML (ref 0–100)

## 2024-08-11 DIAGNOSIS — M62.830 MUSCLE SPASM OF BACK: ICD-10-CM

## 2024-08-12 RX ORDER — BACLOFEN 10 MG/1
TABLET ORAL
Qty: 100 TABLET | Refills: 2 | Status: SHIPPED | OUTPATIENT
Start: 2024-08-12

## 2024-08-23 ENCOUNTER — APPOINTMENT (OUTPATIENT)
Dept: RADIOLOGY | Facility: MEDICAL CENTER | Age: 54
End: 2024-08-23
Attending: NURSE PRACTITIONER
Payer: COMMERCIAL

## 2024-08-23 DIAGNOSIS — J91.8 PLEURAL EFFUSION ASSOCIATED WITH HEPATIC DISORDER: ICD-10-CM

## 2024-08-23 DIAGNOSIS — K86.3 CYST AND PSEUDOCYST OF PANCREAS: ICD-10-CM

## 2024-08-23 DIAGNOSIS — K76.9 PLEURAL EFFUSION ASSOCIATED WITH HEPATIC DISORDER: ICD-10-CM

## 2024-08-23 DIAGNOSIS — K86.2 CYST AND PSEUDOCYST OF PANCREAS: ICD-10-CM

## 2024-08-23 PROCEDURE — 74181 MRI ABDOMEN W/O CONTRAST: CPT

## 2024-08-24 DIAGNOSIS — G43.001 MIGRAINE WITHOUT AURA AND WITH STATUS MIGRAINOSUS, NOT INTRACTABLE: ICD-10-CM

## 2024-08-26 LAB — TEST NAME 95000: NORMAL

## 2024-08-26 RX ORDER — RIZATRIPTAN BENZOATE 10 MG/1
TABLET ORAL
Qty: 12 TABLET | Refills: 0 | Status: SHIPPED | OUTPATIENT
Start: 2024-08-26

## 2024-08-27 ENCOUNTER — HOSPITAL ENCOUNTER (OUTPATIENT)
Dept: RADIOLOGY | Facility: MEDICAL CENTER | Age: 54
End: 2024-08-27
Attending: NURSE PRACTITIONER
Payer: COMMERCIAL

## 2024-08-27 DIAGNOSIS — R22.41 MASS OF RIGHT LOWER LEG: ICD-10-CM

## 2024-08-27 PROCEDURE — 76882 US LMTD JT/FCL EVL NVASC XTR: CPT | Mod: RT

## 2024-09-06 ENCOUNTER — PHARMACY VISIT (OUTPATIENT)
Dept: PHARMACY | Facility: MEDICAL CENTER | Age: 54
End: 2024-09-06
Payer: COMMERCIAL

## 2024-09-06 ENCOUNTER — HOSPITAL ENCOUNTER (OUTPATIENT)
Dept: RADIOLOGY | Facility: MEDICAL CENTER | Age: 54
End: 2024-09-06
Attending: STUDENT IN AN ORGANIZED HEALTH CARE EDUCATION/TRAINING PROGRAM
Payer: COMMERCIAL

## 2024-09-06 DIAGNOSIS — R10.9 ACUTE ABDOMINAL PAIN: ICD-10-CM

## 2024-09-06 PROCEDURE — RXMED WILLOW AMBULATORY MEDICATION CHARGE: Performed by: INTERNAL MEDICINE

## 2024-09-06 RX ORDER — INFLUENZA A VIRUS A/VICTORIA/4897/2022 IVR-238 (H1N1) ANTIGEN (FORMALDEHYDE INACTIVATED), INFLUENZA A VIRUS A/CALIFORNIA/122/2022 SAN-022 (H3N2) ANTIGEN (FORMALDEHYDE INACTIVATED), AND INFLUENZA B VIRUS B/MICHIGAN/01/2021 ANTIGEN (FORMALDEHYDE INACTIVATED) 15; 15; 15 MG/.5ML; MG/.5ML; MG/.5ML
0.5 INJECTION, SUSPENSION INTRAMUSCULAR
Qty: 0.5 ML | Refills: 0 | OUTPATIENT
Start: 2024-09-06

## 2024-09-07 DIAGNOSIS — G43.001 MIGRAINE WITHOUT AURA AND WITH STATUS MIGRAINOSUS, NOT INTRACTABLE: ICD-10-CM

## 2024-09-07 DIAGNOSIS — F51.01 PRIMARY INSOMNIA: ICD-10-CM

## 2024-09-09 ENCOUNTER — APPOINTMENT (OUTPATIENT)
Dept: RADIOLOGY | Facility: MEDICAL CENTER | Age: 54
End: 2024-09-09
Attending: STUDENT IN AN ORGANIZED HEALTH CARE EDUCATION/TRAINING PROGRAM
Payer: COMMERCIAL

## 2024-09-09 RX ORDER — ESZOPICLONE 3 MG/1
3 TABLET, FILM COATED ORAL NIGHTLY PRN
Qty: 90 TABLET | Refills: 1 | Status: SHIPPED | OUTPATIENT
Start: 2024-09-09 | End: 2024-12-08

## 2024-09-09 RX ORDER — RIZATRIPTAN BENZOATE 10 MG/1
TABLET ORAL
Qty: 12 TABLET | Refills: 0 | Status: SHIPPED | OUTPATIENT
Start: 2024-09-09

## 2024-09-10 ENCOUNTER — APPOINTMENT (OUTPATIENT)
Dept: VASCULAR LAB | Facility: MEDICAL CENTER | Age: 54
End: 2024-09-10
Payer: COMMERCIAL

## 2024-09-12 ENCOUNTER — PHARMACY VISIT (OUTPATIENT)
Dept: PHARMACY | Facility: MEDICAL CENTER | Age: 54
End: 2024-09-12
Payer: COMMERCIAL

## 2024-09-12 PROCEDURE — RXMED WILLOW AMBULATORY MEDICATION CHARGE: Performed by: INTERNAL MEDICINE

## 2024-09-12 RX ORDER — COVID-19 VACCINE, MRNA 0.04 MG/.418ML
0.3 INJECTION, SUSPENSION INTRAMUSCULAR
Qty: 0.3 ML | Refills: 0 | OUTPATIENT
Start: 2024-09-12

## 2024-09-16 ENCOUNTER — HOSPITAL ENCOUNTER (OUTPATIENT)
Dept: RADIOLOGY | Facility: MEDICAL CENTER | Age: 54
End: 2024-09-16
Attending: STUDENT IN AN ORGANIZED HEALTH CARE EDUCATION/TRAINING PROGRAM
Payer: COMMERCIAL

## 2024-09-16 PROCEDURE — A9541 TC99M SULFUR COLLOID: HCPCS

## 2024-09-19 ENCOUNTER — APPOINTMENT (OUTPATIENT)
Dept: RADIOLOGY | Facility: MEDICAL CENTER | Age: 54
End: 2024-09-19
Attending: ORTHOPAEDIC SURGERY
Payer: COMMERCIAL

## 2024-09-23 ENCOUNTER — HOSPITAL ENCOUNTER (OUTPATIENT)
Dept: RADIOLOGY | Facility: MEDICAL CENTER | Age: 54
End: 2024-09-23
Attending: NURSE PRACTITIONER
Payer: COMMERCIAL

## 2024-09-23 DIAGNOSIS — R10.10 UPPER ABDOMINAL PAIN: ICD-10-CM

## 2024-09-23 DIAGNOSIS — R93.89 ABNORMAL FINDINGS ON IMAGING TEST: ICD-10-CM

## 2024-09-23 DIAGNOSIS — R63.8 OTHER SYMPTOMS AND SIGNS CONCERNING FOOD AND FLUID INTAKE: ICD-10-CM

## 2024-09-23 DIAGNOSIS — R11.0 NAUSEA: ICD-10-CM

## 2024-09-23 PROCEDURE — A9270 NON-COVERED ITEM OR SERVICE: HCPCS | Performed by: NURSE PRACTITIONER

## 2024-09-23 PROCEDURE — 700112 HCHG RX REV CODE 229: Performed by: NURSE PRACTITIONER

## 2024-09-23 PROCEDURE — 74248 X-RAY SM INT F-THRU STD: CPT

## 2024-09-23 RX ADMIN — ANTACID/ANTIFLATULENT 1 PACKET: 380; 550; 10; 10 GRANULE, EFFERVESCENT ORAL at 14:30

## 2024-09-26 ENCOUNTER — HOSPITAL ENCOUNTER (OUTPATIENT)
Dept: RADIOLOGY | Facility: MEDICAL CENTER | Age: 54
End: 2024-09-26
Attending: ORTHOPAEDIC SURGERY
Payer: COMMERCIAL

## 2024-09-26 DIAGNOSIS — M84.374D METATARSAL STRESS FRACTURE, RIGHT, WITH ROUTINE HEALING, SUBSEQUENT ENCOUNTER: ICD-10-CM

## 2024-09-26 PROCEDURE — 73700 CT LOWER EXTREMITY W/O DYE: CPT | Mod: RT

## 2024-10-28 DIAGNOSIS — G43.001 MIGRAINE WITHOUT AURA AND WITH STATUS MIGRAINOSUS, NOT INTRACTABLE: ICD-10-CM

## 2024-10-31 ENCOUNTER — HOSPITAL ENCOUNTER (OUTPATIENT)
Dept: LAB | Facility: MEDICAL CENTER | Age: 54
End: 2024-10-31
Attending: ALLERGY & IMMUNOLOGY
Payer: COMMERCIAL

## 2024-10-31 LAB
BASOPHILS # BLD AUTO: 0.8 % (ref 0–1.8)
BASOPHILS # BLD: 0.05 K/UL (ref 0–0.12)
EOSINOPHIL # BLD AUTO: 0.06 K/UL (ref 0–0.51)
EOSINOPHIL NFR BLD: 1 % (ref 0–6.9)
ERYTHROCYTE [DISTWIDTH] IN BLOOD BY AUTOMATED COUNT: 39.8 FL (ref 35.9–50)
ERYTHROCYTE [SEDIMENTATION RATE] IN BLOOD BY WESTERGREN METHOD: 3 MM/HOUR (ref 0–25)
HCT VFR BLD AUTO: 47.7 % (ref 37–47)
HGB BLD-MCNC: 16.3 G/DL (ref 12–16)
IMM GRANULOCYTES # BLD AUTO: 0.02 K/UL (ref 0–0.11)
IMM GRANULOCYTES NFR BLD AUTO: 0.3 % (ref 0–0.9)
LYMPHOCYTES # BLD AUTO: 1.58 K/UL (ref 1–4.8)
LYMPHOCYTES NFR BLD: 25.7 % (ref 22–41)
MCH RBC QN AUTO: 32.4 PG (ref 27–33)
MCHC RBC AUTO-ENTMCNC: 34.2 G/DL (ref 32.2–35.5)
MCV RBC AUTO: 94.8 FL (ref 81.4–97.8)
MONOCYTES # BLD AUTO: 0.51 K/UL (ref 0–0.85)
MONOCYTES NFR BLD AUTO: 8.3 % (ref 0–13.4)
NEUTROPHILS # BLD AUTO: 3.92 K/UL (ref 1.82–7.42)
NEUTROPHILS NFR BLD: 63.9 % (ref 44–72)
NRBC # BLD AUTO: 0 K/UL
NRBC BLD-RTO: 0 /100 WBC (ref 0–0.2)
PLATELET # BLD AUTO: 295 K/UL (ref 164–446)
PMV BLD AUTO: 10.4 FL (ref 9–12.9)
RBC # BLD AUTO: 5.03 M/UL (ref 4.2–5.4)
WBC # BLD AUTO: 6.1 K/UL (ref 4.8–10.8)

## 2024-10-31 PROCEDURE — 82785 ASSAY OF IGE: CPT

## 2024-10-31 PROCEDURE — 84165 PROTEIN E-PHORESIS SERUM: CPT

## 2024-10-31 PROCEDURE — 82784 ASSAY IGA/IGD/IGG/IGM EACH: CPT

## 2024-10-31 PROCEDURE — 85025 COMPLETE CBC W/AUTO DIFF WBC: CPT

## 2024-10-31 PROCEDURE — 85652 RBC SED RATE AUTOMATED: CPT

## 2024-10-31 PROCEDURE — 84155 ASSAY OF PROTEIN SERUM: CPT

## 2024-10-31 PROCEDURE — 36415 COLL VENOUS BLD VENIPUNCTURE: CPT

## 2024-10-31 PROCEDURE — 86038 ANTINUCLEAR ANTIBODIES: CPT

## 2024-10-31 PROCEDURE — 86317 IMMUNOASSAY INFECTIOUS AGENT: CPT | Mod: 91

## 2024-10-31 PROCEDURE — 84156 ASSAY OF PROTEIN URINE: CPT

## 2024-10-31 PROCEDURE — 82787 IGG 1 2 3 OR 4 EACH: CPT | Mod: 91

## 2024-11-01 RX ORDER — RIZATRIPTAN BENZOATE 10 MG/1
TABLET ORAL
Qty: 12 TABLET | Refills: 0 | Status: SHIPPED | OUTPATIENT
Start: 2024-11-01

## 2024-11-02 LAB
IGA SERPL-MCNC: 106 MG/DL (ref 68–408)
IGG SERPL-MCNC: 702 MG/DL (ref 768–1632)
IGG1 SER-MCNC: 335 MG/DL (ref 240–1118)
IGG2 SER-MCNC: 265 MG/DL (ref 124–549)
IGG3 SER-MCNC: 38 MG/DL (ref 21–134)
IGG4 SER-MCNC: 58 MG/DL (ref 1–123)
IGM SERPL-MCNC: 105 MG/DL (ref 35–263)
NUCLEAR IGG SER QL IA: NORMAL

## 2024-11-03 LAB
ALBUMIN SERPL ELPH-MCNC: 5.2 G/DL (ref 3.75–5.01)
ALPHA1 GLOB SERPL ELPH-MCNC: 0.27 G/DL (ref 0.19–0.46)
ALPHA2 GLOB SERPL ELPH-MCNC: 0.62 G/DL (ref 0.48–1.05)
B-GLOBULIN SERPL ELPH-MCNC: 0.81 G/DL (ref 0.48–1.1)
C DIPHTHERIAE IGG SER-ACNC: 2.2 IU/ML
C TETANI TOXOID IGG SERPL IA-ACNC: 2.7 IU/ML
EER PROT ELECT SER Q1092: ABNORMAL
GAMMA GLOB SERPL ELPH-MCNC: 0.7 G/DL (ref 0.62–1.51)
HAEM INFLU B IGG SER-MCNC: 0.4 UG/ML
IGE SERPL-ACNC: 6 KU/L
INTERPRETATION SERPL IFE-IMP: ABNORMAL
MONOCLONAL PROTEIN NL11656: ABNORMAL G/DL
PROT SERPL-MCNC: 7.6 G/DL (ref 6.3–8.2)

## 2024-11-04 LAB
S PN DA SERO 19F IGG SER-MCNC: 1.96 UG/ML
S PNEUM DA 1 IGG SER-MCNC: 3.71 UG/ML
S PNEUM DA 12F IGG SER-MCNC: 0.24 UG/ML
S PNEUM DA 14 IGG SER-MCNC: 9.77 UG/ML
S PNEUM DA 18C IGG SER-MCNC: 0.19 UG/ML
S PNEUM DA 23F IGG SER-MCNC: 0.3 UG/ML
S PNEUM DA 3 IGG SER-MCNC: 0.18 UG/ML
S PNEUM DA 4 IGG SER-MCNC: 0.51 UG/ML
S PNEUM DA 5 IGG SER-MCNC: 4.2 UG/ML
S PNEUM DA 6B IGG SER-MCNC: 1.43 UG/ML
S PNEUM DA 7F IGG SER-MCNC: 0.99 UG/ML
S PNEUM DA 8 IGG SER-MCNC: 3.88 UG/ML
S PNEUM DA 9N IGG SER-MCNC: 0.26 UG/ML
S PNEUM DA 9V IGG SER-MCNC: 2.19 UG/ML
S PNEUM SEROTYPE IGG SER-IMP: NORMAL

## 2024-11-05 LAB
ALBUMIN 24H MFR UR ELPH: 100 %
ALPHA1 GLOB 24H MFR UR ELPH: 0 %
ALPHA2 GLOB 24H MFR UR ELPH: 0 %
B-GLOBULIN 24H MFR UR ELPH: 0 %
COLLECT DURATION TIME SPEC: NORMAL HR
EER MONOCLONAL PROTEIN STUDY, 24 HOUR U Q5964: NORMAL
GAMMA GLOB 24H MFR UR ELPH: 0 %
INTERPRETATION UR IFE-IMP: NORMAL
M PROTEIN 24H MFR UR ELPH: 0 %
M PROTEIN 24H UR ELPH-MRATE: NORMAL MG/24 HRS
PROT 24H UR-MRATE: 6 MG/DL
PROT 24H UR-MRATE: NORMAL G/(24.H) (ref 40–150)
SPECIMEN VOL ?TM UR: NORMAL ML

## 2024-11-11 DIAGNOSIS — M62.830 MUSCLE SPASM OF BACK: ICD-10-CM

## 2024-11-12 NOTE — TELEPHONE ENCOUNTER
Received request via: Pharmacy    Was the patient seen in the last year in this department? Yes    Does the patient have an active prescription (recently filled or refills available) for medication(s) requested? No    Ph[facility] Name: [facility]    Does the patient have group home Plus and need 100-day supply? (This applies to ALL medications) Patient does not have SCP

## 2024-11-13 RX ORDER — BACLOFEN 10 MG/1
TABLET ORAL
Qty: 100 TABLET | Refills: 0 | Status: SHIPPED | OUTPATIENT
Start: 2024-11-13

## 2024-11-25 ENCOUNTER — HOSPITAL ENCOUNTER (OUTPATIENT)
Facility: MEDICAL CENTER | Age: 54
End: 2024-11-25
Attending: NURSE PRACTITIONER
Payer: COMMERCIAL

## 2024-11-25 PROCEDURE — 83993 ASSAY FOR CALPROTECTIN FECAL: CPT

## 2024-11-26 ENCOUNTER — HOSPITAL ENCOUNTER (OUTPATIENT)
Dept: LAB | Facility: MEDICAL CENTER | Age: 54
End: 2024-11-26
Attending: NURSE PRACTITIONER
Payer: COMMERCIAL

## 2024-11-26 PROCEDURE — 82941 ASSAY OF GASTRIN: CPT

## 2024-11-26 PROCEDURE — 82105 ALPHA-FETOPROTEIN SERUM: CPT

## 2024-11-26 PROCEDURE — 36415 COLL VENOUS BLD VENIPUNCTURE: CPT

## 2024-11-28 LAB — AFP-TM SERPL-MCNC: 10 NG/ML (ref 0–9)

## 2024-11-29 DIAGNOSIS — G43.001 MIGRAINE WITHOUT AURA AND WITH STATUS MIGRAINOSUS, NOT INTRACTABLE: ICD-10-CM

## 2024-11-29 LAB
CALPROTECTIN STL-MCNT: 62 UG/G
GASTRIN SERPL-MCNC: 74 PG/ML (ref 0–100)

## 2024-12-02 RX ORDER — RIZATRIPTAN BENZOATE 10 MG/1
TABLET ORAL
Qty: 12 TABLET | Refills: 0 | Status: SHIPPED | OUTPATIENT
Start: 2024-12-02 | End: 2024-12-31

## 2024-12-02 NOTE — TELEPHONE ENCOUNTER
Received request via: Pharmacy    Was the patient seen in the last year in this department? Yes    Does the patient have an active prescription (recently filled or refills available) for medication(s) requested? No    Pharmacy Name: [facility]    Does the patient have [facility] and need 100-day supply? (This applies to ALL medications) Patient does not have SCP

## 2024-12-11 DIAGNOSIS — M62.830 MUSCLE SPASM OF BACK: ICD-10-CM

## 2024-12-11 RX ORDER — BACLOFEN 10 MG/1
TABLET ORAL
Qty: 100 TABLET | Refills: 0 | Status: SHIPPED | OUTPATIENT
Start: 2024-12-11 | End: 2024-12-16

## 2024-12-13 DIAGNOSIS — M62.830 MUSCLE SPASM OF BACK: ICD-10-CM

## 2024-12-16 RX ORDER — BACLOFEN 10 MG/1
TABLET ORAL
Qty: 100 TABLET | Refills: 0 | Status: SHIPPED | OUTPATIENT
Start: 2024-12-16

## 2024-12-31 DIAGNOSIS — G43.001 MIGRAINE WITHOUT AURA AND WITH STATUS MIGRAINOSUS, NOT INTRACTABLE: ICD-10-CM

## 2024-12-31 RX ORDER — RIZATRIPTAN BENZOATE 10 MG/1
TABLET ORAL
Qty: 12 TABLET | Refills: 0 | Status: SHIPPED | OUTPATIENT
Start: 2024-12-31

## 2025-01-10 DIAGNOSIS — M62.830 MUSCLE SPASM OF BACK: ICD-10-CM

## 2025-01-10 RX ORDER — BACLOFEN 10 MG/1
TABLET ORAL
Qty: 100 TABLET | Refills: 0 | Status: SHIPPED | OUTPATIENT
Start: 2025-01-10

## 2025-01-10 NOTE — TELEPHONE ENCOUNTER
Received request via: Pharmacy    Was the patient seen in the last year in this department? Yes    Does the patient have an active prescription (recently filled or refills available) for medication(s) requested? No    Pharmacy Name: [facility]    Does the patient have [facility] and need 100-day supply? (This applies to ALL medications) Patient does not have SCP

## 2025-01-13 PROBLEM — M79.671 FOOT PAIN, RIGHT: Status: ACTIVE | Noted: 2025-01-13

## 2025-01-16 ENCOUNTER — TELEPHONE (OUTPATIENT)
Dept: VASCULAR LAB | Facility: MEDICAL CENTER | Age: 55
End: 2025-01-16
Payer: COMMERCIAL

## 2025-01-16 NOTE — TELEPHONE ENCOUNTER
Called to confirm if this will be first time patient is Vascular Med provider and review if any recent testing completed or hospital admissions. No answer, left voicemail to call back.    Correct appointment type? YES   Referral attached to appointment? YES   New patient packet sent via [employer]?  YES

## 2025-01-18 DIAGNOSIS — I10 ESSENTIAL HYPERTENSION: ICD-10-CM

## 2025-01-20 RX ORDER — HYDROCHLOROTHIAZIDE 25 MG/1
25 TABLET ORAL DAILY
Qty: 40 TABLET | Refills: 0 | Status: SHIPPED | OUTPATIENT
Start: 2025-01-20

## 2025-01-30 DIAGNOSIS — G43.001 MIGRAINE WITHOUT AURA AND WITH STATUS MIGRAINOSUS, NOT INTRACTABLE: ICD-10-CM

## 2025-01-30 RX ORDER — RIZATRIPTAN BENZOATE 10 MG/1
TABLET ORAL
Qty: 12 TABLET | Refills: 0 | Status: SHIPPED | OUTPATIENT
Start: 2025-01-30

## 2025-02-07 DIAGNOSIS — M62.830 MUSCLE SPASM OF BACK: ICD-10-CM

## 2025-02-07 RX ORDER — BACLOFEN 10 MG/1
TABLET ORAL
Qty: 100 TABLET | Refills: 0 | Status: SHIPPED | OUTPATIENT
Start: 2025-02-07

## 2025-02-21 ENCOUNTER — TELEPHONE (OUTPATIENT)
Dept: VASCULAR LAB | Facility: MEDICAL CENTER | Age: 55
End: 2025-02-21
Payer: COMMERCIAL

## 2025-02-21 NOTE — TELEPHONE ENCOUNTER
Spoke to patient in regarding NP appointment.    Any recent testing (labs or imaging) outside of [facility]?  No    Were any records requested?  No    New patient packet sent via [employer] or mail?  No previous letter sent out, confirmed appt time and date verbally    Is appointment virtual? No

## 2025-02-25 DIAGNOSIS — G43.001 MIGRAINE WITHOUT AURA AND WITH STATUS MIGRAINOSUS, NOT INTRACTABLE: ICD-10-CM

## 2025-02-25 RX ORDER — RIZATRIPTAN BENZOATE 10 MG/1
TABLET ORAL
Qty: 12 TABLET | Refills: 0 | Status: SHIPPED | OUTPATIENT
Start: 2025-02-25

## 2025-02-26 ENCOUNTER — OFFICE VISIT (OUTPATIENT)
Dept: VASCULAR LAB | Facility: MEDICAL CENTER | Age: 55
End: 2025-02-26
Attending: FAMILY MEDICINE
Payer: COMMERCIAL

## 2025-02-26 VITALS
WEIGHT: 159 LBS | DIASTOLIC BLOOD PRESSURE: 93 MMHG | BODY MASS INDEX: 24.96 KG/M2 | HEIGHT: 67 IN | HEART RATE: 92 BPM | SYSTOLIC BLOOD PRESSURE: 151 MMHG

## 2025-02-26 DIAGNOSIS — M54.12 CERVICAL RADICULOPATHY AT C6: ICD-10-CM

## 2025-02-26 DIAGNOSIS — I77.74 DISSECTION OF EXTRACRANIAL VERTEBRAL ARTERY (HCC): ICD-10-CM

## 2025-02-26 DIAGNOSIS — R73.03 PREDIABETES: ICD-10-CM

## 2025-02-26 DIAGNOSIS — R21 FACIAL RASH: ICD-10-CM

## 2025-02-26 DIAGNOSIS — I10 HYPERTENSION, UNSPECIFIED TYPE: ICD-10-CM

## 2025-02-26 DIAGNOSIS — I10 ESSENTIAL HYPERTENSION: ICD-10-CM

## 2025-02-26 DIAGNOSIS — E78.5 DYSLIPIDEMIA: ICD-10-CM

## 2025-02-26 DIAGNOSIS — I65.23 MILD ATHEROSCLEROSIS OF BOTH CAROTID ARTERIES: ICD-10-CM

## 2025-02-26 DIAGNOSIS — E87.6 HYPOKALEMIA: ICD-10-CM

## 2025-02-26 PROCEDURE — 99212 OFFICE O/P EST SF 10 MIN: CPT

## 2025-02-26 RX ORDER — HYDROCHLOROTHIAZIDE 25 MG/1
25 TABLET ORAL DAILY
Qty: 40 TABLET | Refills: 0 | Status: SHIPPED | OUTPATIENT
Start: 2025-02-26

## 2025-02-26 RX ORDER — CLOPIDOGREL BISULFATE 75 MG/1
75 TABLET ORAL DAILY
Qty: 100 TABLET | Refills: 3 | Status: SHIPPED | OUTPATIENT
Start: 2025-02-26

## 2025-02-26 ASSESSMENT — ENCOUNTER SYMPTOMS
CHILLS: 0
WHEEZING: 0
TREMORS: 0
FEVER: 0
DIZZINESS: 0
HEADACHES: 0
COUGH: 0
CLAUDICATION: 0
WEAKNESS: 0
FOCAL WEAKNESS: 0
ORTHOPNEA: 0
HEMOPTYSIS: 0
SHORTNESS OF BREATH: 0
SEIZURES: 0
PALPITATIONS: 0

## 2025-02-26 ASSESSMENT — FIBROSIS 4 INDEX: FIB4 SCORE: 0.77

## 2025-02-26 NOTE — PROGRESS NOTES
INITIAL VASCULAR MEDICINE CLINIC VISIT  02/26/25     Odalys Ballard is a 54 y.o. female referred for vascular medicine eval/mgmt of L vertebral artery dissection, est 2/2025  Referring provider: Errol Moralez M.D.     Subjective      L vertebral artery dissection (6/2021): stable   Initial visit history: hx of heavy weight lifting.  In 2015 noting intermittent L proximal UE swelling.  Reported in 2017 recurrent L sided occipital area HA, LUE numbness.  Known Cspine radicular sx.  Seen with neurosurg (Dr. Vitor Beasley) and thought perhaps aTOS and seen by initially surgeon (Dr. Magen Saldana) noted incidentally after CTA LUE for eval for thoracic outlet syndrome per  after noting 6mo of intermittent LUE nubmeness, arm fatigue and weakness.  Relieved with NSAIDs.  Mild LUE swelling intermittently only.  Had been experiencing L sided neck pain with exertion.  Seen by PCP 8/2024 and reviewed known hx of L vert art dissection and concerned about its impact on current L sided body aches.  Reports no current vertigo, imbalance, diplopia, ataxia or weakness.      Currently noting permanently bruised at L lateral arm area and intermittent bloching redness.   Gets intermittent swelling and redness LLE.     Chronic venous insufficiency:  Pmhx: known bilat GSV VR per duplex with [facility].  Interested in venous interventions but has not completed to date.  Planning to f/u with [facility].    Has painful varicosities.  No skin changes.     HTN: Stable, tolerating meds with good adherence, Home BP log: not checking routinely  HLD: Stable, current treatment: lifestyle modifications and no current medications  Baseline off-tx   Latest Reference Range & Units 05/17/23 05:59   Cholesterol,Tot 100 - 199 mg/dL 255 (H)   Triglycerides 0 - 149 mg/dL 56   HDL >39 mg/dL 83   LDL Chol Calc (NIH) 0 - 99 mg/dL 163 (H)   VLDL Cholesterol Calc 5 - 40 mg/dL 9     Dysglycemia: yes, preDM    Antithrombotic tx: prior on ASA 81mg 2021 - no hx of  bleeding      Patient Active Problem List    Diagnosis Date Noted    Mild atherosclerosis of both carotid arteries 02/26/2025    Foot pain, right 01/13/2025    Ganglion cyst of foot-right dorsal 08/07/2024    Stress fracture of right toe 07/03/2024    Pharyngeal dysphagia 07/03/2024    Chronic left hip pain 12/14/2023    Recurrent infections 10/15/2023    Eosinophilic esophagitis 05/18/2023    Chronic left-sided low back pain with left-sided sciatica 02/09/2023    Dissection of vertebral artery (HCC)- left on CTA 6/2021- Dr. Sadlana 03/03/2022    S/P bilateral mastectomy 05/04/2021    Cervical radiculopathy at C6 10/01/2020    Essential hypertension 10/01/2020    IFG (impaired fasting glucose) 10/01/2020    Vitamin D deficiency 10/01/2020    Dyslipidemia 10/01/2020    Primary insomnia 10/01/2020    Mild persistent asthma without complication 06/15/2020    Family history of breast cancer gene mutation in first degree relative 06/15/2020    Migraine without aura and with status migrainosus, not intractable 06/15/2020      Past Surgical History:   Procedure Laterality Date    BREAST RECONSTRUCTION Bilateral 2/11/2021    Procedure: RECONSTRUCTION, BREAST - AND REVISION WITH DERMAL GLANDULAR FLAPS, CAPSULECTOMY;  Surgeon: Amos Dang M.D.;  Location: SURGERY SAME DAY [facility];  Service: Plastics    EXCISION,FAT GRAFT  2/11/2021    Procedure: EXCISION, FAT GRAFT- FOR FAT GRAFTING;  Surgeon: Amos Dang M.D.;  Location: SURGERY SAME DAY [facility];  Service: Plastics    TISSUE EXPANDER PLACE/REMOVE Bilateral 2/11/2021    Procedure: INSERTION OR REMOVAL, TISSUE EXPANDER;  Surgeon: Amos Dang M.D.;  Location: SURGERY SAME DAY [facility];  Service: Plastics    BREAST IMPLANT REVISION Bilateral 2/11/2021    Procedure: INSERTION, IMPLANT, BREAST;  Surgeon: Amos Dang M.D.;  Location: SURGERY SAME DAY [facility];  Service: Plastics    LIPOSUCTION  2/11/2021    Procedure: LIPOSUCTION-ABDOMEN, FLANKS, HIPS  OUTTER THIGHS, AND UPPER BACK;  Surgeon: Amos Dang M.D.;  Location: SURGERY SAME DAY [facility];  Service: Plastics    PB MASTECTOMY, SIMPLE, COMPLETE Bilateral 11/5/2020    Procedure: MASTECTOMY;  Surgeon: Franny Felix M.D.;  Location: SURGERY [facility];  Service: General    BREAST RECONSTRUCTION Bilateral 11/5/2020    Procedure: RECONSTRUCTION, BREAST;  Surgeon: Amos Dang M.D.;  Location: SURGERY [facility];  Service: Plastics    TISSUE EXPANDER PLACE/REMOVE Bilateral 11/5/2020    Procedure: TISSUE EXPANDER- FOR PLACEMENT  AND USE OF ACELLULAR DERMAL MATRIX;  Surgeon: Amos Dang M.D.;  Location: SURGERY [facility];  Service: Plastics    HYSTERECTOMY ROBOTIC N/A 10/17/2016    Procedure: HYSTERECTOMY ROBOTIC, BILATERAL SALPINGECTOMY;  Surgeon: Yamilet Trammell M.D.;  Location: SURGERY [facility];  Service:     CYSTOSCOPY N/A 10/17/2016    Procedure: CYSTOSCOPY;  Surgeon: Yamilet Trammell M.D.;  Location: SURGERY [facility];  Service:     PELVISCOPY  9/6/2013    Performed by Spencer Matthews M.D. at [facility]    CHOLECYSTECTOMY  2009    laparoscopic    LAPAROSCOPY  2000, 2008      Family History   Problem Relation Age of Onset    Diabetes Other     Heart Disease Other     Hypertension Other     Cancer Other       Current Outpatient Medications on File Prior to Visit   Medication Sig Dispense Refill    rizatriptan (MAXALT) 10 MG tablet TAKE 1 TABLET BY MOUTH ONCE DAILY AS NEEDED FOR MIGRAINE HEADACHE 12 Tablet 0    Eszopiclone 3 MG Tab Take 3 mg by mouth at bedtime as needed.      Olopatadine HCl 0.6 % Solution SPRAY 1 TO 2 SPRAY(S) IN EACH NOSTRIL TWICE DAILY      estradiol (ESTRACE) 0.1 MG/GM vaginal cream INSERT 1/4 GRAM OF CREAM VAGINALLY ONCE DAILY      pantoprazole (PROTONIX) 40 MG Tablet Delayed Response Take 40 mg by mouth every day.      baclofen (LIORESAL) 10 MG Tab Take 1 tablet by mouth three times daily as needed for muscle spasm 100 Tablet 0     Estradiol 1 MG/GM Gel Place  on the skin.      fexofenadine (ALLEGRA ALLERGY) 180 MG tablet       triamcinolone (NASACORT ALLERGY 24HR) 55 MCG/ACT nasal inhaler       gabapentin (NEURONTIN) 100 MG Cap Take 1 Capsule by mouth 3 times a day. 300 Capsule 3    albuterol 108 (90 Base) MCG/ACT Aero Soln inhalation aerosol Inhale 2 Puffs every 6 hours as needed for Shortness of Breath. 8.5 g 11    metoclopramide (REGLAN) 5 MG tablet       Norethin-Eth Estrad-Fe Biphas 1 MG-10 MCG / 10 MCG Tab Take  by mouth.      sulfamethoxazole-trimethoprim (BACTRIM DS) 800-160 MG tablet TAKE 1 TABLET BY MOUTH TWICE A DAY FOR 14 DAYS      ofloxacin otic sol (FLOXIN OTIC) 0.3 % Solution       PROTONIX 40 MG Tablet Delayed Response        No current facility-administered medications on file prior to visit.      Allergies   Allergen Reactions    Ace Inhibitors Rash     Other Reaction(s): Cough    Dairy Food Allergy Rash, Swelling and Vomiting    Gluten Meal Cough, Swelling and Vomiting     Other reaction(s): Other (See Comments)    congestion    Other Reaction(s): Cough    Other reaction(s): Other (See Comments) congestion    Lisinopril Cough    Losartan Cough     Per pt reports that she had bad congestion    Other Reaction(s): .Unknown, Cough, Other (See Comments)    congestion      Per pt reports that she had bad congestion    Pineapple Cough, Itching, Swelling, Vomiting and Rash    Sulfa Drugs      Other Reaction(s): .Unknown      Social History     Tobacco Use    Smoking status: Never    Smokeless tobacco: Never   Vaping Use    Vaping status: Never Used   Substance Use Topics    Alcohol use: Not Currently     Comment: 2 per week    Drug use: No     DIET AND EXERCISE:  Weight Change:stable   Diet: common adult  Exercise: moderate regular exercise program     Review of Systems   Constitutional:  Negative for chills, fever and malaise/fatigue.   Respiratory:  Negative for cough, hemoptysis, shortness of breath and wheezing.   "  Cardiovascular:  Negative for chest pain, palpitations, orthopnea, claudication and leg swelling.   Neurological:  Negative for dizziness, tremors, focal weakness, seizures, weakness and headaches.          Objective    Vitals:    02/26/25 1444 02/26/25 1449   BP: (!) 155/91 (!) 151/93   BP Location: Left arm Left arm   Patient Position: Sitting Sitting   BP Cuff Size: Adult long Adult long   Pulse: (!) 101 92   Weight: 72.1 kg (159 lb)    Height: 1.702 m (5' 7\")       BP Readings from Last 10 Encounters:   02/26/25 (!) 151/93   08/07/24 (!) 142/70   07/14/24 (!) 154/100   06/24/24 108/82   06/09/24 118/76   04/30/24 110/84   03/08/24 120/70   12/14/23 114/80   10/13/23 126/84   07/12/23 128/88      Body mass index is 24.9 kg/m².   Wt Readings from Last 4 Encounters:   02/26/25 72.1 kg (159 lb)   08/07/24 70.3 kg (155 lb)   07/14/24 67.1 kg (148 lb)   06/24/24 67.1 kg (148 lb)      Physical Exam  Constitutional:       General: She is not in acute distress.     Appearance: Normal appearance. She is not diaphoretic.   HENT:      Head: Normocephalic and atraumatic.   Eyes:      Conjunctiva/sclera: Conjunctivae normal.   Cardiovascular:      Rate and Rhythm: Normal rate and regular rhythm.      Pulses:           Carotid pulses are 2+ on the right side and 2+ on the left side.       Radial pulses are 2+ on the right side and 2+ on the left side.        Dorsalis pedis pulses are 2+ on the right side and 2+ on the left side.        Posterior tibial pulses are 2+ on the right side and 2+ on the left side.      Heart sounds: Normal heart sounds.   Pulmonary:      Effort: Pulmonary effort is normal.      Breath sounds: Normal breath sounds.   Musculoskeletal:      Right lower leg: No edema.      Left lower leg: No edema.   Skin:     General: Skin is warm and dry.   Neurological:      Mental Status: She is alert and oriented to person, place, and time.      Cranial Nerves: No cranial nerve deficit.      Gait: Gait is intact. " "  Psychiatric:         Mood and Affect: Mood and affect normal.          DATA REVIEW    Lab Results   Component Value Date/Time    CHOLSTRLTOT 229 (H) 05/16/2024 07:41 AM    CHOLSTRLTOT 225 (H) 05/16/2024 07:41 AM     (H) 05/16/2024 07:41 AM     (H) 05/16/2024 07:41 AM     05/16/2024 07:41 AM     05/16/2024 07:41 AM    TRIGLYCERIDE 39 05/16/2024 07:41 AM    TRIGLYCERIDE 39 05/16/2024 07:41 AM       Lab Results   Component Value Date/Time     (H) 05/16/2024 07:41 AM     (H) 05/16/2024 07:41 AM     (H) 12/27/2023 08:48 AM     (H) 11/02/2023 08:04 AM     (H) 01/24/2022 09:37 AM       No results found for: \"LIPOPROTA\"   No results found for: \"APOB\"   Lab Results   Component Value Date/Time    CRPHIGHSEN 1.6 05/16/2024 07:41 AM         Lab Results   Component Value Date/Time    SODIUM 140 05/16/2024 07:41 AM    SODIUM 140 05/16/2024 07:41 AM    POTASSIUM 3.4 (L) 05/16/2024 07:41 AM    POTASSIUM 3.4 (L) 05/16/2024 07:41 AM    CHLORIDE 103 05/16/2024 07:41 AM    CHLORIDE 103 05/16/2024 07:41 AM    CO2 23 05/16/2024 07:41 AM    CO2 23 05/16/2024 07:41 AM    GLUCOSE 101 (H) 05/16/2024 07:41 AM    GLUCOSE 101 (H) 05/16/2024 07:41 AM    BUN 21 05/16/2024 07:41 AM    BUN 22 05/16/2024 07:41 AM    CREATININE 0.77 05/16/2024 07:41 AM    CREATININE 0.79 05/16/2024 07:41 AM    BUNCREATRAT 24 (H) 05/17/2023 05:59 AM     Lab Results   Component Value Date/Time    ALKPHOSPHAT 62 05/16/2024 07:41 AM    ALKPHOSPHAT 62 05/16/2024 07:41 AM    ASTSGOT 21 05/16/2024 07:41 AM    ASTSGOT 21 05/16/2024 07:41 AM    ALTSGPT 23 05/16/2024 07:41 AM    ALTSGPT 23 05/16/2024 07:41 AM    TBILIRUBIN 0.5 05/16/2024 07:41 AM    TBILIRUBIN 0.5 05/16/2024 07:41 AM       Lab Results   Component Value Date/Time    HBA1C 5.5 05/16/2024 07:41 AM    HBA1C 5.3 05/16/2024 07:41 AM    HBA1C 5.5 12/27/2023 08:48 AM       No results found for: \"MICROALBCALC\", \"MALBCRT\", \"MALBEXCR\", \"XCIWXF60\", " "\"MICROALBUR\", \"MICRALB\", \"UMICROALBUM\", \"MICROALBTIM\"      IMAGIN/2020 CT chest with   Unremarkable CT scan of the chest.    2021 CTA neck/LUE  1.  Dissection in the left vertebral artery extending for approximately 1.4 cm.   2.  No findings to suggest arteriaLeft vertebral artery dominant. There is focal dissection in the left vertebral artery at the C3-4 level. Dissection extends superior to inferior for approximately 1.4 cm or venous thoracic outlet obstruction with arm abduction.    3/2022 CTPA   Aorta: Evaluation limited by suboptimal contrast opacification. Ascending aorta measures 3.3 cm in diameter.   Heart: The heart is not enlarged. No pericardial effusion is seen  1.  No central or segmental pulmonary embolus is identified.  2.  No acute cardiopulmonary process is seen.    2022 WBI    Wrist-brachial indices are normal. Additional testing was not preformed in    accordance with the upper extremity arterial protocol.     Carotid   1.  There is a mild amount of atherosclerotic plaque.  Plaque is located in carotid bulbs and proximal internal carotid arteries.  Plaque characterization:  Smooth and lamina   2. There is no hemodynamically significant stenosis. Diameter reduction in the internal carotid arteries: less than 50%. There is no evidence of carotid occlusion.   3. Redemonstration of left vertebral artery dissection. Vertebral arteries demonstrate antegrade flow.    2024 BLE venous   No bilateral lower extremity deep venous thrombosis.    2024 CT a/p with   The adrenals are normal.   The kidneys show uniform nephrograms. There is no hydronephrosis. There are no renal or ureteral calcifications.   Abdominal aorta shows normal caliber throughout its course. There is no para-aortic adenopathy.    2024 RODRIGUEZ   Normal ankle brachial indices.      PROCEDURES: none          Medical Decision Making:  Today's Assessment / Status / Plan:     1. Dissection of extracranial vertebral artery (HCC) " " CONNECTIVE TISSUE DISEASES PROFILE    MPO AND PR3 WITH REFLEX TO ANCA    Comp Metabolic Panel    HEPATITIS PANEL ACUTE (4 COMPONENTS)    CRP QUANTITIVE (NON-CARDIAC)    Sed Rate    clopidogrel (PLAVIX) 75 MG Tab    CT-CTA NECK WITH & W/O-POST PROCESSING      2. Cervical radiculopathy at C6        3. Mild atherosclerosis of both carotid arteries        4. Prediabetes        5. Hypokalemia        6. Hypertension, unspecified type  Comp Metabolic Panel    Lipid Profile    ALDOSTERONE    RENIN ACTIVITY    MICROALBUMIN CREAT RATIO URINE      7. Facial rash  CONNECTIVE TISSUE DISEASES PROFILE    HEPATITIS PANEL ACUTE (4 COMPONENTS)    CRP QUANTITIVE (NON-CARDIAC)      8. Dyslipidemia  Lipid Profile    Lipoprotein (a)         Established CVD:      1) Left extra-cranial vertebral artery dissection, chronic  6/2021 - initially noted 1.4cm at C3-C4 level   2023 Carotid duplex = stable, no changes   - as reviewed this would need lead to current multiplicity of diffuse symptoms and less likely to lead to LUE numbness  - has no classic posterior circ sx   - no known medium vessel vasculitis or rheum d/o   Plan:  - update additional vasculitis, connective d/o, inflam labs   - update CTA neck for interval surveillance      2) CHRONIC VENOUS DISEASE / INSUFFICIENCY   LLE CEAP classification: per [facility]   Plan:  - ongoing care with [facility]    - start/continue knee-high compression socks, 20-30mmHg, as much as tolerated, reviewed application, use of donning aide, resources for purchasing   - increase walking, avoid prolonged standing/sitting  - active calf pumping exercises if prolonged standing and elevate legs above heart level while sitting and sleeping   - reduce sodium (\"salt\") in diet to less than 2,000mg daily   - continue daily moisturizing lotion (such as [employer] Diabetic Foot Cream)  Medications:    - in general, diuretics will not help with CVI-assoc edema and should be avoided   - preferred evidence-based oral " "agents per 2023 SVS guidelines include MPFF (Rx Vasculera or OTC diosmin/hespiridin), Ruscus extract ('s broom extract)  - other available agents include horse chestnut seed extract   - start  OTC MPFF (brand with evidence-based dosing regimen is [facility] Support) for 6 mo trial      BLOOD PRESSURE CONTROL   Office BP goal per ACC/AHA <130/80  Home BP at goal:  no  Office BP at goal:  no  24h ABPM:  not ordered to date   RDN candidate? YES  Contributing factors: none   - higher risk for 2ry HTN due to age and stage 2 HTN   Plan:   - start/continue home BP monitoring, reviewed correct technique, provide BP log and instructions  - order 24h ABPM:  NO  - routine monitoring of lytes/gfr   Medications:  - continue HCTZ 25mg daily for now     LIPID MANAGEMENT  Qualifies for Statin Therapy per ACC/AHA Guidelines: no, does not qualify  The ASCVD Risk score (Sayra HOWARD, et al., 2019) failed to calculate., <5% \"low risk\"  Major ASCVD events: None    High-risk conditions: N/A  Risk-enhancers: None  Lipoprotein(a): Ordered this visit  Currently on Statin: No  Tx goals: LDL-C <100   At goal? no  Plan:   - update labs   - - low threshold to start at least moderate-intensity statin  if risk score  >7.5%      GLYCEMIC MANAGEMENT Normal    ANTITHROMBOTIC THERAPY: Yes , continue plavix 75mg dailyi   - stopped ASA due to GERD, currently on PPI     LIFESTYLE INTERVENTIONS  TOBACCO:    reports that she has never smoked. She has never used smokeless tobacco.   - continued complete avoidance of all tobacco products   PHYSICAL ACTIVITY: >150min/week of mod-intensity activity or as much as tolerated  NUTRITION: Mediterranean, Plant-based - increase nuts, beans, whole grains, substitute plant protein for animal 1-2 times/week, and reduce Na to <1,500mg/day   ETOH: to limit to occasional social use  WT MGMT: maintain current healthy wt     OTHER:     # facial blushing, swelling - unclear etiology, low prob vascular mediated "   - update labs    STUDIES: now - CTA neck   LABS: as noted above  FOLLOW-UP: 2 months     Time: 46min - - chart review/prep, review of other providers' records, imaging/lab review, face-to-face time for history/examination, ordering, prescribing,  review of results/meds/ treatment plan with patient/family/caregiver, documentation in EMR, care coordination     Devin Washington M.D.   [facility]  [facility]  [phone]

## 2025-02-28 ENCOUNTER — HOSPITAL ENCOUNTER (OUTPATIENT)
Dept: LAB | Facility: MEDICAL CENTER | Age: 55
End: 2025-02-28
Attending: FAMILY MEDICINE
Payer: COMMERCIAL

## 2025-02-28 DIAGNOSIS — E78.5 DYSLIPIDEMIA: ICD-10-CM

## 2025-02-28 DIAGNOSIS — I77.74 DISSECTION OF EXTRACRANIAL VERTEBRAL ARTERY (HCC): ICD-10-CM

## 2025-02-28 DIAGNOSIS — R21 FACIAL RASH: ICD-10-CM

## 2025-02-28 DIAGNOSIS — I10 HYPERTENSION, UNSPECIFIED TYPE: ICD-10-CM

## 2025-02-28 LAB
25(OH)D3 SERPL-MCNC: 26 NG/ML (ref 30–100)
ALBUMIN SERPL BCP-MCNC: 4.7 G/DL (ref 3.2–4.9)
ALBUMIN/GLOB SERPL: 1.7 G/DL
ALP SERPL-CCNC: 50 U/L (ref 30–99)
ALT SERPL-CCNC: 24 U/L (ref 2–50)
ANION GAP SERPL CALC-SCNC: 11 MMOL/L (ref 7–16)
AST SERPL-CCNC: 28 U/L (ref 12–45)
BASOPHILS # BLD AUTO: 1.2 % (ref 0–1.8)
BASOPHILS # BLD: 0.06 K/UL (ref 0–0.12)
BILIRUB SERPL-MCNC: 0.6 MG/DL (ref 0.1–1.5)
BUN SERPL-MCNC: 24 MG/DL (ref 8–22)
CALCIUM ALBUM COR SERPL-MCNC: 9.3 MG/DL (ref 8.5–10.5)
CALCIUM SERPL-MCNC: 9.9 MG/DL (ref 8.5–10.5)
CHLORIDE SERPL-SCNC: 103 MMOL/L (ref 96–112)
CHOLEST SERPL-MCNC: 230 MG/DL (ref 100–199)
CO2 SERPL-SCNC: 25 MMOL/L (ref 20–33)
CREAT SERPL-MCNC: 0.96 MG/DL (ref 0.5–1.4)
CREAT UR-MCNC: 46.4 MG/DL
CRP SERPL HS-MCNC: 1.3 MG/L (ref 0–3)
CRP SERPL HS-MCNC: <0.3 MG/DL (ref 0–0.75)
DHEA-S SERPL-MCNC: 53.4 UG/DL (ref 35.4–256)
EOSINOPHIL # BLD AUTO: 0.14 K/UL (ref 0–0.51)
EOSINOPHIL NFR BLD: 2.8 % (ref 0–6.9)
ERYTHROCYTE [DISTWIDTH] IN BLOOD BY AUTOMATED COUNT: 42.4 FL (ref 35.9–50)
ERYTHROCYTE [SEDIMENTATION RATE] IN BLOOD BY WESTERGREN METHOD: 3 MM/HOUR (ref 0–25)
EST. AVERAGE GLUCOSE BLD GHB EST-MCNC: 114 MG/DL
ESTRADIOL SERPL-MCNC: <5 PG/ML
FASTING STATUS PATIENT QL REPORTED: NORMAL
GFR SERPLBLD CREATININE-BSD FMLA CKD-EPI: 71 ML/MIN/1.73 M 2
GLOBULIN SER CALC-MCNC: 2.7 G/DL (ref 1.9–3.5)
GLUCOSE SERPL-MCNC: 86 MG/DL (ref 65–99)
HAV IGM SERPL QL IA: NORMAL
HBA1C MFR BLD: 5.6 % (ref 4–5.6)
HBV CORE IGM SER QL: NORMAL
HBV SURFACE AG SER QL: NORMAL
HCT VFR BLD AUTO: 45.6 % (ref 37–47)
HCV AB SER QL: NORMAL
HCYS SERPL-SCNC: 11 UMOL/L
HDLC SERPL-MCNC: 76 MG/DL
HGB BLD-MCNC: 15.3 G/DL (ref 12–16)
IMM GRANULOCYTES # BLD AUTO: 0.02 K/UL (ref 0–0.11)
IMM GRANULOCYTES NFR BLD AUTO: 0.4 % (ref 0–0.9)
LDLC SERPL CALC-MCNC: 145 MG/DL
LYMPHOCYTES # BLD AUTO: 0.93 K/UL (ref 1–4.8)
LYMPHOCYTES NFR BLD: 18.9 % (ref 22–41)
MCH RBC QN AUTO: 31.4 PG (ref 27–33)
MCHC RBC AUTO-ENTMCNC: 33.6 G/DL (ref 32.2–35.5)
MCV RBC AUTO: 93.6 FL (ref 81.4–97.8)
MICROALBUMIN UR-MCNC: <1.2 MG/DL
MICROALBUMIN/CREAT UR: NORMAL MG/G (ref 0–30)
MONOCYTES # BLD AUTO: 0.65 K/UL (ref 0–0.85)
MONOCYTES NFR BLD AUTO: 13.2 % (ref 0–13.4)
NEUTROPHILS # BLD AUTO: 3.13 K/UL (ref 1.82–7.42)
NEUTROPHILS NFR BLD: 63.5 % (ref 44–72)
NRBC # BLD AUTO: 0 K/UL
NRBC BLD-RTO: 0 /100 WBC (ref 0–0.2)
PLATELET # BLD AUTO: 279 K/UL (ref 164–446)
PMV BLD AUTO: 10.1 FL (ref 9–12.9)
POTASSIUM SERPL-SCNC: 4.2 MMOL/L (ref 3.6–5.5)
PROGEST SERPL-MCNC: 1.77 NG/ML
PROLACTIN SERPL-MCNC: 13 NG/ML (ref 2.8–26)
PROT SERPL-MCNC: 7.4 G/DL (ref 6–8.2)
RBC # BLD AUTO: 4.87 M/UL (ref 4.2–5.4)
SODIUM SERPL-SCNC: 139 MMOL/L (ref 135–145)
T3FREE SERPL-MCNC: 3.24 PG/ML (ref 2–4.4)
T4 FREE SERPL-MCNC: 1.35 NG/DL (ref 0.93–1.7)
THYROPEROXIDASE AB SERPL-ACNC: 17.7 IU/ML (ref 0–9)
TRIGL SERPL-MCNC: 47 MG/DL (ref 0–149)
TSH SERPL-ACNC: 1.33 UIU/ML (ref 0.35–5.5)
WBC # BLD AUTO: 4.9 K/UL (ref 4.8–10.8)

## 2025-02-28 PROCEDURE — 82570 ASSAY OF URINE CREATININE: CPT

## 2025-02-28 PROCEDURE — 82627 DEHYDROEPIANDROSTERONE: CPT

## 2025-02-28 PROCEDURE — 84403 ASSAY OF TOTAL TESTOSTERONE: CPT

## 2025-02-28 PROCEDURE — 84305 ASSAY OF SOMATOMEDIN: CPT

## 2025-02-28 PROCEDURE — 84439 ASSAY OF FREE THYROXINE: CPT

## 2025-02-28 PROCEDURE — 83090 ASSAY OF HOMOCYSTEINE: CPT

## 2025-02-28 PROCEDURE — 82306 VITAMIN D 25 HYDROXY: CPT

## 2025-02-28 PROCEDURE — 84244 ASSAY OF RENIN: CPT

## 2025-02-28 PROCEDURE — 84443 ASSAY THYROID STIM HORMONE: CPT

## 2025-02-28 PROCEDURE — 82679 ASSAY OF ESTRONE: CPT

## 2025-02-28 PROCEDURE — 83516 IMMUNOASSAY NONANTIBODY: CPT | Mod: 91

## 2025-02-28 PROCEDURE — 83036 HEMOGLOBIN GLYCOSYLATED A1C: CPT

## 2025-02-28 PROCEDURE — 83695 ASSAY OF LIPOPROTEIN(A): CPT

## 2025-02-28 PROCEDURE — 82670 ASSAY OF TOTAL ESTRADIOL: CPT

## 2025-02-28 PROCEDURE — 84402 ASSAY OF FREE TESTOSTERONE: CPT

## 2025-02-28 PROCEDURE — 84481 FREE ASSAY (FT-3): CPT

## 2025-02-28 PROCEDURE — 80061 LIPID PANEL: CPT

## 2025-02-28 PROCEDURE — 82043 UR ALBUMIN QUANTITATIVE: CPT

## 2025-02-28 PROCEDURE — 86141 C-REACTIVE PROTEIN HS: CPT

## 2025-02-28 PROCEDURE — 80074 ACUTE HEPATITIS PANEL: CPT

## 2025-02-28 PROCEDURE — 84144 ASSAY OF PROGESTERONE: CPT

## 2025-02-28 PROCEDURE — 84270 ASSAY OF SEX HORMONE GLOBUL: CPT

## 2025-02-28 PROCEDURE — 80053 COMPREHEN METABOLIC PANEL: CPT

## 2025-02-28 PROCEDURE — 86376 MICROSOMAL ANTIBODY EACH: CPT

## 2025-02-28 PROCEDURE — 86140 C-REACTIVE PROTEIN: CPT

## 2025-02-28 PROCEDURE — 36415 COLL VENOUS BLD VENIPUNCTURE: CPT

## 2025-02-28 PROCEDURE — 86235 NUCLEAR ANTIGEN ANTIBODY: CPT | Mod: 91

## 2025-02-28 PROCEDURE — 84146 ASSAY OF PROLACTIN: CPT

## 2025-02-28 PROCEDURE — 84482 T3 REVERSE: CPT

## 2025-02-28 PROCEDURE — 83525 ASSAY OF INSULIN: CPT

## 2025-02-28 PROCEDURE — 85652 RBC SED RATE AUTOMATED: CPT

## 2025-02-28 PROCEDURE — 84140 ASSAY OF PREGNENOLONE: CPT

## 2025-02-28 PROCEDURE — 85025 COMPLETE CBC W/AUTO DIFF WBC: CPT

## 2025-02-28 PROCEDURE — 82088 ASSAY OF ALDOSTERONE: CPT

## 2025-03-01 SDOH — ECONOMIC STABILITY: TRANSPORTATION INSECURITY
IN THE PAST 12 MONTHS, HAS LACK OF TRANSPORTATION KEPT YOU FROM MEETINGS, WORK, OR FROM GETTING THINGS NEEDED FOR DAILY LIVING?: NO

## 2025-03-01 SDOH — ECONOMIC STABILITY: TRANSPORTATION INSECURITY
IN THE PAST 12 MONTHS, HAS THE LACK OF TRANSPORTATION KEPT YOU FROM MEDICAL APPOINTMENTS OR FROM GETTING MEDICATIONS?: NO

## 2025-03-01 SDOH — ECONOMIC STABILITY: HOUSING INSECURITY
IN THE LAST 12 MONTHS, WAS THERE A TIME WHEN YOU DID NOT HAVE A STEADY PLACE TO SLEEP OR SLEPT IN A SHELTER (INCLUDING NOW)?: NO

## 2025-03-01 SDOH — ECONOMIC STABILITY: INCOME INSECURITY: IN THE LAST 12 MONTHS, WAS THERE A TIME WHEN YOU WERE NOT ABLE TO PAY THE MORTGAGE OR RENT ON TIME?: NO

## 2025-03-01 SDOH — ECONOMIC STABILITY: FOOD INSECURITY: WITHIN THE PAST 12 MONTHS, THE FOOD YOU BOUGHT JUST DIDN'T LAST AND YOU DIDN'T HAVE MONEY TO GET MORE.: NEVER TRUE

## 2025-03-01 SDOH — HEALTH STABILITY: PHYSICAL HEALTH: ON AVERAGE, HOW MANY DAYS PER WEEK DO YOU ENGAGE IN MODERATE TO STRENUOUS EXERCISE (LIKE A BRISK WALK)?: 7 DAYS

## 2025-03-01 SDOH — ECONOMIC STABILITY: INCOME INSECURITY: HOW HARD IS IT FOR YOU TO PAY FOR THE VERY BASICS LIKE FOOD, HOUSING, MEDICAL CARE, AND HEATING?: NOT HARD AT ALL

## 2025-03-01 SDOH — HEALTH STABILITY: PHYSICAL HEALTH: ON AVERAGE, HOW MANY MINUTES DO YOU ENGAGE IN EXERCISE AT THIS LEVEL?: 120 MIN

## 2025-03-01 SDOH — ECONOMIC STABILITY: FOOD INSECURITY: WITHIN THE PAST 12 MONTHS, YOU WORRIED THAT YOUR FOOD WOULD RUN OUT BEFORE YOU GOT MONEY TO BUY MORE.: NEVER TRUE

## 2025-03-01 SDOH — ECONOMIC STABILITY: TRANSPORTATION INSECURITY
IN THE PAST 12 MONTHS, HAS LACK OF RELIABLE TRANSPORTATION KEPT YOU FROM MEDICAL APPOINTMENTS, MEETINGS, WORK OR FROM GETTING THINGS NEEDED FOR DAILY LIVING?: NO

## 2025-03-01 SDOH — HEALTH STABILITY: MENTAL HEALTH
STRESS IS WHEN SOMEONE FEELS TENSE, NERVOUS, ANXIOUS, OR CAN'T SLEEP AT NIGHT BECAUSE THEIR MIND IS TROUBLED. HOW STRESSED ARE YOU?: ONLY A LITTLE

## 2025-03-01 ASSESSMENT — LIFESTYLE VARIABLES
AUDIT-C TOTAL SCORE: 1
AUDIT-C TOTAL SCORE: 1
HOW MANY STANDARD DRINKS CONTAINING ALCOHOL DO YOU HAVE ON A TYPICAL DAY: 1 OR 2
SKIP TO QUESTIONS 9-10: 1
HOW OFTEN DO YOU HAVE A DRINK CONTAINING ALCOHOL: MONTHLY OR LESS
HOW OFTEN DO YOU HAVE SIX OR MORE DRINKS ON ONE OCCASION: NEVER
HOW OFTEN DO YOU HAVE A DRINK CONTAINING ALCOHOL: MONTHLY OR LESS
HOW MANY STANDARD DRINKS CONTAINING ALCOHOL DO YOU HAVE ON A TYPICAL DAY: 1 OR 2
SKIP TO QUESTIONS 9-10: 1
HOW OFTEN DO YOU HAVE SIX OR MORE DRINKS ON ONE OCCASION: NEVER

## 2025-03-01 ASSESSMENT — SOCIAL DETERMINANTS OF HEALTH (SDOH)
IN THE PAST 12 MONTHS, HAS THE ELECTRIC, GAS, OIL, OR WATER COMPANY THREATENED TO SHUT OFF SERVICE IN YOUR HOME?: NO
IN A TYPICAL WEEK, HOW MANY TIMES DO YOU TALK ON THE PHONE WITH FAMILY, FRIENDS, OR NEIGHBORS?: MORE THAN THREE TIMES A WEEK
HOW OFTEN DO YOU ATTENT MEETINGS OF THE CLUB OR ORGANIZATION YOU BELONG TO?: MORE THAN 4 TIMES PER YEAR
WITHIN THE PAST 12 MONTHS, YOU WORRIED THAT YOUR FOOD WOULD RUN OUT BEFORE YOU GOT THE MONEY TO BUY MORE: NEVER TRUE
DO YOU BELONG TO ANY CLUBS OR ORGANIZATIONS SUCH AS CHURCH GROUPS UNIONS, FRATERNAL OR ATHLETIC GROUPS, OR SCHOOL GROUPS?: YES
IN THE PAST 12 MONTHS, HAS THE ELECTRIC, GAS, OIL, OR WATER COMPANY THREATENED TO SHUT OFF SERVICE IN YOUR HOME?: NO
HOW OFTEN DO YOU GET TOGETHER WITH FRIENDS OR RELATIVES?: MORE THAN THREE TIMES A WEEK
HOW OFTEN DO YOU ATTEND CHURCH OR RELIGIOUS SERVICES?: MORE THAN 4 TIMES PER YEAR
IN A TYPICAL WEEK, HOW MANY TIMES DO YOU TALK ON THE PHONE WITH FAMILY, FRIENDS, OR NEIGHBORS?: MORE THAN THREE TIMES A WEEK
DO YOU BELONG TO ANY CLUBS OR ORGANIZATIONS SUCH AS CHURCH GROUPS UNIONS, FRATERNAL OR ATHLETIC GROUPS, OR SCHOOL GROUPS?: YES
HOW OFTEN DO YOU GET TOGETHER WITH FRIENDS OR RELATIVES?: MORE THAN THREE TIMES A WEEK
HOW OFTEN DO YOU ATTENT MEETINGS OF THE CLUB OR ORGANIZATION YOU BELONG TO?: MORE THAN 4 TIMES PER YEAR
HOW OFTEN DO YOU ATTENT MEETINGS OF THE CLUB OR ORGANIZATION YOU BELONG TO?: MORE THAN 4 TIMES PER YEAR
HOW OFTEN DO YOU HAVE A DRINK CONTAINING ALCOHOL: MONTHLY OR LESS
HOW OFTEN DO YOU ATTEND CHURCH OR RELIGIOUS SERVICES?: MORE THAN 4 TIMES PER YEAR
HOW HARD IS IT FOR YOU TO PAY FOR THE VERY BASICS LIKE FOOD, HOUSING, MEDICAL CARE, AND HEATING?: NOT HARD AT ALL
HOW OFTEN DO YOU GET TOGETHER WITH FRIENDS OR RELATIVES?: MORE THAN THREE TIMES A WEEK
HOW OFTEN DO YOU HAVE SIX OR MORE DRINKS ON ONE OCCASION: NEVER
HOW OFTEN DO YOU ATTEND CHURCH OR RELIGIOUS SERVICES?: MORE THAN 4 TIMES PER YEAR
DO YOU BELONG TO ANY CLUBS OR ORGANIZATIONS SUCH AS CHURCH GROUPS UNIONS, FRATERNAL OR ATHLETIC GROUPS, OR SCHOOL GROUPS?: YES
HOW MANY DRINKS CONTAINING ALCOHOL DO YOU HAVE ON A TYPICAL DAY WHEN YOU ARE DRINKING: 1 OR 2
IN A TYPICAL WEEK, HOW MANY TIMES DO YOU TALK ON THE PHONE WITH FAMILY, FRIENDS, OR NEIGHBORS?: MORE THAN THREE TIMES A WEEK

## 2025-03-03 LAB
ALDOST SERPL-MCNC: 11.6 NG/DL
CENTROMERE IGG TITR SER IF: 0 AU/ML (ref 0–40)
ENA JO1 AB TITR SER: 1 AU/ML (ref 0–40)
ENA SCL70 IGG SER QL: 1 AU/ML (ref 0–40)
ENA SM IGG SER-ACNC: 1 AU/ML (ref 0–40)
ENA SS-A 60KD AB SER-ACNC: 0 AU/ML (ref 0–40)
ENA SS-A IGG SER QL: 1 AU/ML (ref 0–40)
ENA SS-B IGG SER IA-ACNC: 0 AU/ML (ref 0–40)
FASTING STATUS PATIENT QL REPORTED: NORMAL
IGF-I SERPL-MCNC: 178 NG/ML (ref 53–234)
IGF-I Z-SCORE SERPL: 1
INSULIN P FAST SERPL-ACNC: 7 UIU/ML (ref 3–25)
LPA SERPL-MCNC: <6 MG/DL
MYELOPEROXIDASE AB SER-ACNC: 0 AU/ML (ref 0–19)
PROTEINASE3 AB SER-ACNC: 0 AU/ML (ref 0–19)
RIBOSOMAL P AB SER-ACNC: 0 AU/ML (ref 0–40)
U1 SNRNP IGG SER QL: 12 UNITS (ref 0–19)

## 2025-03-03 SDOH — ECONOMIC STABILITY: FOOD INSECURITY: WITHIN THE PAST 12 MONTHS, YOU WORRIED THAT YOUR FOOD WOULD RUN OUT BEFORE YOU GOT MONEY TO BUY MORE.: NEVER TRUE

## 2025-03-03 SDOH — ECONOMIC STABILITY: INCOME INSECURITY: HOW HARD IS IT FOR YOU TO PAY FOR THE VERY BASICS LIKE FOOD, HOUSING, MEDICAL CARE, AND HEATING?: NOT HARD AT ALL

## 2025-03-03 SDOH — ECONOMIC STABILITY: INCOME INSECURITY: IN THE LAST 12 MONTHS, WAS THERE A TIME WHEN YOU WERE NOT ABLE TO PAY THE MORTGAGE OR RENT ON TIME?: NO

## 2025-03-03 SDOH — HEALTH STABILITY: PHYSICAL HEALTH: ON AVERAGE, HOW MANY MINUTES DO YOU ENGAGE IN EXERCISE AT THIS LEVEL?: 150+ MIN

## 2025-03-03 SDOH — HEALTH STABILITY: PHYSICAL HEALTH: ON AVERAGE, HOW MANY DAYS PER WEEK DO YOU ENGAGE IN MODERATE TO STRENUOUS EXERCISE (LIKE A BRISK WALK)?: 7 DAYS

## 2025-03-03 SDOH — ECONOMIC STABILITY: FOOD INSECURITY: WITHIN THE PAST 12 MONTHS, THE FOOD YOU BOUGHT JUST DIDN'T LAST AND YOU DIDN'T HAVE MONEY TO GET MORE.: NEVER TRUE

## 2025-03-03 ASSESSMENT — SOCIAL DETERMINANTS OF HEALTH (SDOH)
HOW OFTEN DO YOU ATTEND CHURCH OR RELIGIOUS SERVICES?: MORE THAN 4 TIMES PER YEAR
HOW OFTEN DO YOU HAVE SIX OR MORE DRINKS ON ONE OCCASION: NEVER
IN THE PAST 12 MONTHS, HAS THE ELECTRIC, GAS, OIL, OR WATER COMPANY THREATENED TO SHUT OFF SERVICE IN YOUR HOME?: NO
DO YOU BELONG TO ANY CLUBS OR ORGANIZATIONS SUCH AS CHURCH GROUPS UNIONS, FRATERNAL OR ATHLETIC GROUPS, OR SCHOOL GROUPS?: YES
HOW OFTEN DO YOU GET TOGETHER WITH FRIENDS OR RELATIVES?: MORE THAN THREE TIMES A WEEK
HOW MANY DRINKS CONTAINING ALCOHOL DO YOU HAVE ON A TYPICAL DAY WHEN YOU ARE DRINKING: 1 OR 2
IN A TYPICAL WEEK, HOW MANY TIMES DO YOU TALK ON THE PHONE WITH FAMILY, FRIENDS, OR NEIGHBORS?: MORE THAN THREE TIMES A WEEK
WITHIN THE PAST 12 MONTHS, YOU WORRIED THAT YOUR FOOD WOULD RUN OUT BEFORE YOU GOT THE MONEY TO BUY MORE: NEVER TRUE
IN A TYPICAL WEEK, HOW MANY TIMES DO YOU TALK ON THE PHONE WITH FAMILY, FRIENDS, OR NEIGHBORS?: MORE THAN THREE TIMES A WEEK
HOW HARD IS IT FOR YOU TO PAY FOR THE VERY BASICS LIKE FOOD, HOUSING, MEDICAL CARE, AND HEATING?: NOT HARD AT ALL
HOW OFTEN DO YOU ATTENT MEETINGS OF THE CLUB OR ORGANIZATION YOU BELONG TO?: MORE THAN 4 TIMES PER YEAR
HOW OFTEN DO YOU GET TOGETHER WITH FRIENDS OR RELATIVES?: MORE THAN THREE TIMES A WEEK
HOW OFTEN DO YOU ATTENT MEETINGS OF THE CLUB OR ORGANIZATION YOU BELONG TO?: MORE THAN 4 TIMES PER YEAR
HOW OFTEN DO YOU HAVE A DRINK CONTAINING ALCOHOL: MONTHLY OR LESS
DO YOU BELONG TO ANY CLUBS OR ORGANIZATIONS SUCH AS CHURCH GROUPS UNIONS, FRATERNAL OR ATHLETIC GROUPS, OR SCHOOL GROUPS?: YES
HOW OFTEN DO YOU ATTEND CHURCH OR RELIGIOUS SERVICES?: MORE THAN 4 TIMES PER YEAR

## 2025-03-03 ASSESSMENT — LIFESTYLE VARIABLES
HOW MANY STANDARD DRINKS CONTAINING ALCOHOL DO YOU HAVE ON A TYPICAL DAY: 1 OR 2
HOW OFTEN DO YOU HAVE SIX OR MORE DRINKS ON ONE OCCASION: NEVER
SKIP TO QUESTIONS 9-10: 1
AUDIT-C TOTAL SCORE: 1
HOW OFTEN DO YOU HAVE A DRINK CONTAINING ALCOHOL: MONTHLY OR LESS

## 2025-03-04 ENCOUNTER — OFFICE VISIT (OUTPATIENT)
Dept: MEDICAL GROUP | Age: 55
End: 2025-03-04
Payer: COMMERCIAL

## 2025-03-04 ENCOUNTER — APPOINTMENT (OUTPATIENT)
Dept: MEDICAL GROUP | Age: 55
End: 2025-03-04
Payer: COMMERCIAL

## 2025-03-04 ENCOUNTER — HOSPITAL ENCOUNTER (OUTPATIENT)
Facility: MEDICAL CENTER | Age: 55
End: 2025-03-04
Attending: INTERNAL MEDICINE
Payer: COMMERCIAL

## 2025-03-04 VITALS
BODY MASS INDEX: 24.17 KG/M2 | SYSTOLIC BLOOD PRESSURE: 140 MMHG | OXYGEN SATURATION: 96 % | HEART RATE: 120 BPM | WEIGHT: 154 LBS | DIASTOLIC BLOOD PRESSURE: 84 MMHG | TEMPERATURE: 97.9 F | HEIGHT: 67 IN

## 2025-03-04 DIAGNOSIS — J45.30 MILD PERSISTENT ASTHMA WITHOUT COMPLICATION: ICD-10-CM

## 2025-03-04 DIAGNOSIS — E78.5 DYSLIPIDEMIA: ICD-10-CM

## 2025-03-04 DIAGNOSIS — J98.8 CONGESTION OF RESPIRATORY TRACT: ICD-10-CM

## 2025-03-04 DIAGNOSIS — J20.9 ACUTE BRONCHITIS WITH ASTHMA: ICD-10-CM

## 2025-03-04 DIAGNOSIS — I10 ESSENTIAL HYPERTENSION: ICD-10-CM

## 2025-03-04 DIAGNOSIS — J45.909 ACUTE BRONCHITIS WITH ASTHMA: ICD-10-CM

## 2025-03-04 LAB
ESTRONE SERPL-MCNC: 11.8 PG/ML
FLUAV RNA SPEC QL NAA+PROBE: NEGATIVE
FLUBV RNA SPEC QL NAA+PROBE: NEGATIVE
RENIN PLAS-CCNC: 0.4 NG/ML/HR
RSV RNA SPEC QL NAA+PROBE: NEGATIVE
SARS-COV-2 RNA RESP QL NAA+PROBE: NEGATIVE

## 2025-03-04 PROCEDURE — 3079F DIAST BP 80-89 MM HG: CPT | Performed by: INTERNAL MEDICINE

## 2025-03-04 PROCEDURE — 0241U POCT CEPHEID COV-2, FLU A/B, RSV - PCR: CPT | Performed by: INTERNAL MEDICINE

## 2025-03-04 PROCEDURE — 83993 ASSAY FOR CALPROTECTIN FECAL: CPT

## 2025-03-04 PROCEDURE — 3077F SYST BP >= 140 MM HG: CPT | Performed by: INTERNAL MEDICINE

## 2025-03-04 PROCEDURE — 99214 OFFICE O/P EST MOD 30 MIN: CPT | Performed by: INTERNAL MEDICINE

## 2025-03-04 RX ORDER — ALBUTEROL SULFATE 90 UG/1
2 INHALANT RESPIRATORY (INHALATION) EVERY 6 HOURS PRN
Qty: 8.5 G | Refills: 11 | Status: SHIPPED | OUTPATIENT
Start: 2025-03-04

## 2025-03-04 RX ORDER — BUDESONIDE AND FORMOTEROL FUMARATE DIHYDRATE 80; 4.5 UG/1; UG/1
2 AEROSOL RESPIRATORY (INHALATION) 2 TIMES DAILY
Qty: 6 EACH | Refills: 3 | Status: SHIPPED | OUTPATIENT
Start: 2025-03-04

## 2025-03-04 RX ORDER — GUANFACINE 1 MG/1
1 TABLET ORAL DAILY
Qty: 90 TABLET | Refills: 3 | Status: SHIPPED | OUTPATIENT
Start: 2025-03-04

## 2025-03-04 RX ORDER — ROSUVASTATIN CALCIUM 5 MG/1
5 TABLET, COATED ORAL EVERY EVENING
Qty: 100 TABLET | Refills: 3 | Status: SHIPPED | OUTPATIENT
Start: 2025-03-04 | End: 2026-04-08

## 2025-03-04 RX ORDER — AZITHROMYCIN 250 MG/1
TABLET, FILM COATED ORAL
Qty: 6 TABLET | Refills: 1 | Status: SHIPPED | OUTPATIENT
Start: 2025-03-04

## 2025-03-04 RX ORDER — GUAIFENESIN AND DEXTROMETHORPHAN HYDROBROMIDE 10; 100 MG/5ML; MG/5ML
5 SYRUP ORAL EVERY 6 HOURS PRN
Qty: 840 ML | Refills: 2 | Status: SHIPPED | OUTPATIENT
Start: 2025-03-04

## 2025-03-04 ASSESSMENT — ENCOUNTER SYMPTOMS
PSYCHIATRIC NEGATIVE: 1
NEUROLOGICAL NEGATIVE: 1
RESPIRATORY NEGATIVE: 1
GASTROINTESTINAL NEGATIVE: 1
CONSTITUTIONAL NEGATIVE: 1
EYES NEGATIVE: 1
CARDIOVASCULAR NEGATIVE: 1
MUSCULOSKELETAL NEGATIVE: 1

## 2025-03-04 ASSESSMENT — FIBROSIS 4 INDEX: FIB4 SCORE: 1.07

## 2025-03-04 ASSESSMENT — PATIENT HEALTH QUESTIONNAIRE - PHQ9: CLINICAL INTERPRETATION OF PHQ2 SCORE: 0
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
## 2025-03-04 NOTE — ASSESSMENT & PLAN NOTE
Not at goal.  Start low-dose statin Crestor 5 mg daily.  High risk for cardiovascular disease due to history of vascular dissection  Orders:    rosuvastatin (CRESTOR) 5 MG Tab; Take 1 Tablet by mouth every evening.

## 2025-03-04 NOTE — ASSESSMENT & PLAN NOTE
Intolerant of beta-blockers ARB's and ACEs.  Therefore trial of alpha-blocker plus her current diuretic to keep  or less systolic.  Continue with home BP monitoring recheck in 3 months.  Orders:    guanFACINE (TENEX) 1 MG Tab; Take 1 Tablet by mouth every day.
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
## 2025-03-05 LAB — T3REVERSE SERPL-MCNC: 12.2 NG/DL (ref 9–27)

## 2025-03-06 LAB
SHBG SERPL-SCNC: 63 NMOL/L (ref 17–125)
TESTOST FREE SERPL-MCNC: 2.3 PG/ML (ref 0.6–3.8)
TESTOST SERPL-MCNC: 21 NG/DL (ref 9–55)

## 2025-03-07 LAB — CALPROTECTIN STL-MCNT: 79 UG/G

## 2025-03-08 DIAGNOSIS — M62.830 MUSCLE SPASM OF BACK: ICD-10-CM

## 2025-03-08 LAB — PREG SERPL-MCNC: 85 NG/DL (ref 15–132)

## 2025-03-10 ENCOUNTER — HOSPITAL ENCOUNTER (OUTPATIENT)
Dept: RADIOLOGY | Facility: MEDICAL CENTER | Age: 55
End: 2025-03-10
Attending: FAMILY MEDICINE
Payer: COMMERCIAL

## 2025-03-10 DIAGNOSIS — I77.74 DISSECTION OF EXTRACRANIAL VERTEBRAL ARTERY (HCC): ICD-10-CM

## 2025-03-10 PROCEDURE — 70498 CT ANGIOGRAPHY NECK: CPT

## 2025-03-10 PROCEDURE — 700117 HCHG RX CONTRAST REV CODE 255: Performed by: FAMILY MEDICINE

## 2025-03-10 RX ORDER — BACLOFEN 10 MG/1
10 TABLET ORAL 3 TIMES DAILY PRN
Qty: 100 TABLET | Refills: 0 | Status: SHIPPED | OUTPATIENT
Start: 2025-03-10

## 2025-03-10 RX ADMIN — IOHEXOL 80 ML: 350 INJECTION, SOLUTION INTRAVENOUS at 18:53

## 2025-03-11 ENCOUNTER — DOCUMENTATION (OUTPATIENT)
Dept: VASCULAR LAB | Facility: MEDICAL CENTER | Age: 55
End: 2025-03-11
Payer: COMMERCIAL

## 2025-03-12 NOTE — PROGRESS NOTES
CTA neck with stable. 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   Plan for repeat CTA neck in 2yrs (3/2027) - RN to track   Pending f/u.

## 2025-03-27 DIAGNOSIS — I10 ESSENTIAL HYPERTENSION: ICD-10-CM

## 2025-03-27 DIAGNOSIS — G43.001 MIGRAINE WITHOUT AURA AND WITH STATUS MIGRAINOSUS, NOT INTRACTABLE: ICD-10-CM

## 2025-03-28 RX ORDER — HYDROCHLOROTHIAZIDE 25 MG/1
25 TABLET ORAL DAILY
Qty: 40 TABLET | Refills: 0 | Status: SHIPPED | OUTPATIENT
Start: 2025-03-28

## 2025-03-28 RX ORDER — RIZATRIPTAN BENZOATE 10 MG/1
TABLET ORAL
Qty: 12 TABLET | Refills: 0 | Status: SHIPPED | OUTPATIENT
Start: 2025-03-28

## 2025-04-06 DIAGNOSIS — M62.830 MUSCLE SPASM OF BACK: ICD-10-CM

## 2025-04-07 RX ORDER — BACLOFEN 10 MG/1
10 TABLET ORAL 3 TIMES DAILY PRN
Qty: 100 TABLET | Refills: 2 | Status: SHIPPED | OUTPATIENT
Start: 2025-04-07

## 2025-04-21 DIAGNOSIS — M25.552 CHRONIC LEFT HIP PAIN: ICD-10-CM

## 2025-04-21 DIAGNOSIS — F51.01 PRIMARY INSOMNIA: ICD-10-CM

## 2025-04-21 DIAGNOSIS — G89.29 CHRONIC LEFT HIP PAIN: ICD-10-CM

## 2025-04-21 NOTE — TELEPHONE ENCOUNTER
Received request via: Pharmacy    Was the patient seen in the last year in this department? Yes    Does the patient have an active prescription (recently filled or refills available) for medication(s) requested? No    Pharmacy Name: [facility]     Does the patient have residential Plus and need 100-day supply? (This applies to ALL medications) Patient does not have SCP

## 2025-04-22 ENCOUNTER — APPOINTMENT (OUTPATIENT)
Dept: VASCULAR LAB | Facility: MEDICAL CENTER | Age: 55
End: 2025-04-22
Attending: FAMILY MEDICINE
Payer: COMMERCIAL

## 2025-04-22 RX ORDER — GABAPENTIN 100 MG/1
100 CAPSULE ORAL 3 TIMES DAILY
Qty: 300 CAPSULE | Refills: 3 | Status: SHIPPED | OUTPATIENT
Start: 2025-04-22

## 2025-04-25 DIAGNOSIS — G43.001 MIGRAINE WITHOUT AURA AND WITH STATUS MIGRAINOSUS, NOT INTRACTABLE: ICD-10-CM

## 2025-04-28 RX ORDER — RIZATRIPTAN BENZOATE 10 MG/1
TABLET ORAL
Qty: 12 TABLET | Refills: 0 | Status: SHIPPED | OUTPATIENT
Start: 2025-04-28

## 2025-05-08 DIAGNOSIS — I10 ESSENTIAL HYPERTENSION: ICD-10-CM

## 2025-05-08 RX ORDER — HYDROCHLOROTHIAZIDE 25 MG/1
25 TABLET ORAL DAILY
Qty: 40 TABLET | Refills: 0 | Status: SHIPPED | OUTPATIENT
Start: 2025-05-08

## 2025-05-08 NOTE — TELEPHONE ENCOUNTER
Received request via: Pharmacy    Was the patient seen in the last year in this department? Yes    Does the patient have an active prescription (recently filled or refills available) for medication(s) requested? No    Pharmacy Name: [id number] - [place], [place] - [address]     Does the patient have half-way Plus and need 100-day supply? (This applies to ALL medications) Patient does not have SCP

## 2025-05-11 DIAGNOSIS — F51.01 PRIMARY INSOMNIA: ICD-10-CM

## 2025-05-12 RX ORDER — ESZOPICLONE 3 MG/1
3 TABLET, FILM COATED ORAL
Qty: 90 TABLET | Refills: 1 | Status: SHIPPED | OUTPATIENT
Start: 2025-05-12 | End: 2025-08-10

## 2025-05-22 DIAGNOSIS — G43.001 MIGRAINE WITHOUT AURA AND WITH STATUS MIGRAINOSUS, NOT INTRACTABLE: ICD-10-CM

## 2025-05-22 RX ORDER — RIZATRIPTAN BENZOATE 10 MG/1
TABLET ORAL
Qty: 12 TABLET | Refills: 0 | Status: SHIPPED | OUTPATIENT
Start: 2025-05-22

## 2025-05-29 ENCOUNTER — HOSPITAL ENCOUNTER (OUTPATIENT)
Dept: RADIOLOGY | Facility: MEDICAL CENTER | Age: 55
End: 2025-05-29
Attending: INTERNAL MEDICINE
Payer: COMMERCIAL

## 2025-05-29 DIAGNOSIS — R10.9 ACUTE ABDOMINAL PAIN: ICD-10-CM

## 2025-05-29 PROCEDURE — 74019 RADEX ABDOMEN 2 VIEWS: CPT

## 2025-05-30 ENCOUNTER — HOSPITAL ENCOUNTER (OUTPATIENT)
Dept: RADIOLOGY | Facility: MEDICAL CENTER | Age: 55
End: 2025-05-30
Attending: INTERNAL MEDICINE
Payer: COMMERCIAL

## 2025-05-30 DIAGNOSIS — R10.9 STOMACH ACHE: ICD-10-CM

## 2025-05-30 PROCEDURE — 74019 RADEX ABDOMEN 2 VIEWS: CPT

## 2025-06-02 ENCOUNTER — TELEPHONE (OUTPATIENT)
Dept: VASCULAR LAB | Facility: MEDICAL CENTER | Age: 55
End: 2025-06-02
Payer: COMMERCIAL

## 2025-06-04 ENCOUNTER — OFFICE VISIT (OUTPATIENT)
Dept: VASCULAR LAB | Facility: MEDICAL CENTER | Age: 55
End: 2025-06-04
Attending: FAMILY MEDICINE
Payer: COMMERCIAL

## 2025-06-04 ENCOUNTER — TELEPHONE (OUTPATIENT)
Dept: VASCULAR LAB | Facility: MEDICAL CENTER | Age: 55
End: 2025-06-04

## 2025-06-04 VITALS
SYSTOLIC BLOOD PRESSURE: 143 MMHG | WEIGHT: 162.4 LBS | HEIGHT: 66 IN | DIASTOLIC BLOOD PRESSURE: 83 MMHG | BODY MASS INDEX: 26.1 KG/M2 | HEART RATE: 99 BPM

## 2025-06-04 DIAGNOSIS — R73.03 PREDIABETES: ICD-10-CM

## 2025-06-04 DIAGNOSIS — E78.5 DYSLIPIDEMIA: ICD-10-CM

## 2025-06-04 DIAGNOSIS — I10 HYPERTENSION, UNSPECIFIED TYPE: ICD-10-CM

## 2025-06-04 DIAGNOSIS — R76.8 ANTI-TPO ANTIBODIES PRESENT: Chronic | ICD-10-CM

## 2025-06-04 DIAGNOSIS — I65.23 MILD ATHEROSCLEROSIS OF BOTH CAROTID ARTERIES: ICD-10-CM

## 2025-06-04 DIAGNOSIS — I77.74 DISSECTION OF EXTRACRANIAL VERTEBRAL ARTERY (HCC): Primary | Chronic | ICD-10-CM

## 2025-06-04 PROCEDURE — 99212 OFFICE O/P EST SF 10 MIN: CPT | Performed by: FAMILY MEDICINE

## 2025-06-04 PROCEDURE — 3075F SYST BP GE 130 - 139MM HG: CPT | Performed by: FAMILY MEDICINE

## 2025-06-04 PROCEDURE — 99214 OFFICE O/P EST MOD 30 MIN: CPT | Performed by: FAMILY MEDICINE

## 2025-06-04 PROCEDURE — 3078F DIAST BP <80 MM HG: CPT | Performed by: FAMILY MEDICINE

## 2025-06-04 RX ORDER — SPIRONOLACTONE AND HYDROCHLOROTHIAZIDE 25; 25 MG/1; MG/1
1 TABLET ORAL EVERY MORNING
Qty: 100 TABLET | Refills: 3 | Status: SHIPPED | OUTPATIENT
Start: 2025-06-04

## 2025-06-04 ASSESSMENT — ENCOUNTER SYMPTOMS
DIZZINESS: 0
HEMOPTYSIS: 0
PALPITATIONS: 0
WHEEZING: 0
FOCAL WEAKNESS: 0
FEVER: 0
CHILLS: 0
ORTHOPNEA: 0
TREMORS: 0
COUGH: 0
SHORTNESS OF BREATH: 0
SEIZURES: 0
WEAKNESS: 0
CLAUDICATION: 0
HEADACHES: 0

## 2025-06-04 ASSESSMENT — FIBROSIS 4 INDEX: FIB4 SCORE: 1.07

## 2025-06-04 NOTE — PROGRESS NOTES
FOLLOW-UP VASCULAR MEDICINE CLINIC   25     Odalys Elio, ref for eval/mgmt of L vertebral artery dissection, est 2025  Referring provider: Errol Moralez M.D.     Subjective    Interval hx/concerns: last seen 2025,  had interval labs and CTA.  Toleriating meds, no new sx  High TPO ab    PAC 11.6, PRA 0.4  ANCA neg   3/2025 CTA neck with stable L vert art diss at C3-4 level, no changes over time   Reports improved Left head swelling since starting plavix  Noting mild worsening with weight lifting.    EMG/NCS done with Dr. Nichole (neuro)  Had full body MRI showing absent L PcoA and no signs of stroke or small vessel CeVD    Home BP lo-120s/80s    L vertebral artery dissection (2021): stable   Initial visit history: hx of heavy weight lifting.  In  noting intermittent L proximal UE swelling.  Reported in 2017 recurrent L sided occipital area HA, LUE numbness.  Known Cspine radicular sx.  Seen with neurosurg (Dr. Vitor Beasley) and thought perhaps aTOS and seen by initially surgeon (Dr. Magen Saldana) noted incidentally after CTA LUE for eval for thoracic outlet syndrome per  after noting 6mo of intermittent LUE nubmeness, arm fatigue and weakness.  Relieved with NSAIDs.  Mild LUE swelling intermittently only.  Had been experiencing L sided neck pain with exertion.  Seen by PCP 2024 and reviewed known hx of L vert art dissection and concerned about its impact on current L sided body aches.  Reports no current vertigo, imbalance, diplopia, ataxia or weakness.      Currently noting permanently bruised at L lateral arm area and intermittent bloching redness.   Gets intermittent swelling and redness LLE.     Chronic venous insufficiency:  Pmhx: known bilat GSV VR per duplex with [facility].  Interested in venous interventions but has not completed to date.  Planning to f/u with [facility].    Has painful varicosities.  No skin changes.     HTN: Stable, tolerating meds with good  adherence    HLD: Stable, current treatment: lifestyle modifications and no current medications    Dysglycemia: yes, preDM    Antithrombotic tx: prior on ASA 81mg 2021 - no hx of bleeding       Current Outpatient Medications on File Prior to Visit   Medication Sig Dispense Refill    rizatriptan (MAXALT) 10 MG tablet TAKE 1 TABLET BY MOUTH ONCE DAILY AS NEEDED FOR MIGRAINE HEADACHE 12 Tablet 0    Eszopiclone 3 MG Tab Take 1 Tablet by mouth at bedtime as needed (sleep) for up to 90 days. 90 Tablet 1    gabapentin (NEURONTIN) 100 MG Cap Take 1 Capsule by mouth 3 times a day. 300 Capsule 3    baclofen (LIORESAL) 10 MG Tab Take 1 tablet by mouth three times daily as needed for muscle spasm 100 Tablet 2    budesonide-formoterol (SYMBICORT) 80-4.5 MCG/ACT Aerosol Inhale 2 Puffs 2 times a day. (Patient taking differently: Inhale 2 Puffs as needed.) 6 Each 3    albuterol 108 (90 Base) MCG/ACT Aero Soln inhalation aerosol Inhale 2 Puffs every 6 hours as needed for Shortness of Breath. 8.5 g 11    clopidogrel (PLAVIX) 75 MG Tab Take 1 Tablet by mouth every day. Blood thinner 100 Tablet 3    Olopatadine HCl 0.6 % Solution SPRAY 1 TO 2 SPRAY(S) IN EACH NOSTRIL TWICE DAILY      estradiol (ESTRACE) 0.1 MG/GM vaginal cream INSERT 1/4 GRAM OF CREAM VAGINALLY ONCE DAILY      pantoprazole (PROTONIX) 40 MG Tablet Delayed Response Take 40 mg by mouth every day.      Estradiol 1 MG/GM Gel Place  on the skin.      fexofenadine (ALLEGRA ALLERGY) 180 MG tablet       triamcinolone (NASACORT ALLERGY 24HR) 55 MCG/ACT nasal inhaler       azithromycin (ZITHROMAX) 250 MG Tab Take 2 tabs today then 1 per day for 4 days 6 Tablet 1    Dextromethorphan-guaiFENesin (TUSSIN DM)  MG/5ML Syrup Take 5 mL by mouth every 6 hours as needed (cough). 840 mL 2    guanFACINE (TENEX) 1 MG Tab Take 1 Tablet by mouth every day. 90 Tablet 3    rosuvastatin (CRESTOR) 5 MG Tab Take 1 Tablet by mouth every evening. 100 Tablet 3    PROTONIX 40 MG Tablet Delayed  "Response        No current facility-administered medications on file prior to visit.      Allergies   Allergen Reactions    Ace Inhibitors Rash     Other Reaction(s): Cough    Beta Adrenergic Blockers Unspecified     Patient with asthma, beta-blockers contraindicated    Lisinopril Cough    Losartan Cough     Per pt reports that she had bad congestion    Other Reaction(s): .Unknown, Cough, Other (See Comments)    congestion      Per pt reports that she had bad congestion      Social History     Tobacco Use    Smoking status: Never    Smokeless tobacco: Never   Vaping Use    Vaping status: Never Used   Substance Use Topics    Alcohol use: Not Currently     Comment: 2 per week    Drug use: No     DIET AND EXERCISE:  Weight Change:stable   Diet: common adult  Exercise: moderate regular exercise program     Review of Systems   Constitutional:  Negative for chills, fever and malaise/fatigue.   Respiratory:  Negative for cough, hemoptysis, shortness of breath and wheezing.    Cardiovascular:  Negative for chest pain, palpitations, orthopnea, claudication and leg swelling.   Neurological:  Negative for dizziness, tremors, focal weakness, seizures, weakness and headaches.          Objective    Vitals:    06/04/25 0905 06/04/25 0915   BP: 136/70 (!) 143/83   BP Location: Left arm Left arm   Patient Position: Sitting Sitting   BP Cuff Size: Adult Adult   Pulse: 87 99   Weight: 73.7 kg (162 lb 6.4 oz)    Height: 1.676 m (5' 6\")       BP Readings from Last 10 Encounters:   06/04/25 (!) 143/83   03/04/25 (!) 140/84   02/26/25 (!) 151/93   08/07/24 (!) 142/70   07/14/24 (!) 154/100   06/24/24 108/82   06/09/24 118/76   04/30/24 110/84   03/08/24 120/70   12/14/23 114/80      Body mass index is 26.21 kg/m².   Wt Readings from Last 4 Encounters:   06/04/25 73.7 kg (162 lb 6.4 oz)   05/15/25 73.2 kg (161 lb 6 oz)   05/08/25 70.3 kg (155 lb)   03/04/25 69.9 kg (154 lb)      Physical Exam  Constitutional:       General: She is not in " "acute distress.     Appearance: Normal appearance. She is not diaphoretic.   HENT:      Head: Normocephalic and atraumatic.   Eyes:      Conjunctiva/sclera: Conjunctivae normal.   Cardiovascular:      Rate and Rhythm: Normal rate and regular rhythm.      Pulses:           Carotid pulses are 2+ on the right side and 2+ on the left side.       Radial pulses are 2+ on the right side and 2+ on the left side.        Dorsalis pedis pulses are 2+ on the right side and 2+ on the left side.        Posterior tibial pulses are 2+ on the right side and 2+ on the left side.      Heart sounds: Normal heart sounds.   Pulmonary:      Effort: Pulmonary effort is normal.      Breath sounds: Normal breath sounds.   Musculoskeletal:      Right lower leg: No edema.      Left lower leg: No edema.   Skin:     General: Skin is warm and dry.   Neurological:      Mental Status: She is alert and oriented to person, place, and time.      Cranial Nerves: No cranial nerve deficit.      Gait: Gait is intact.   Psychiatric:         Mood and Affect: Mood and affect normal.          DATA REVIEW    Baseline off-tx   Latest Reference Range & Units 05/17/23 05:59   Cholesterol,Tot 100 - 199 mg/dL 255 (H)   Triglycerides 0 - 149 mg/dL 56   HDL >39 mg/dL 83   LDL Chol Calc (NIH) 0 - 99 mg/dL 163 (H)   VLDL Cholesterol Calc 5 - 40 mg/dL 9     Lab Results   Component Value Date/Time    CHOLSTRLTOT 230 (H) 02/28/2025 09:10 AM     (H) 02/28/2025 09:10 AM    HDL 76 02/28/2025 09:10 AM    TRIGLYCERIDE 47 02/28/2025 09:10 AM       Lab Results   Component Value Date/Time     (H) 02/28/2025 09:10 AM     (H) 05/16/2024 07:41 AM     (H) 05/16/2024 07:41 AM     (H) 12/27/2023 08:48 AM     (H) 11/02/2023 08:04 AM       Lab Results   Component Value Date/Time    LIPOPROTA <6 02/28/2025 09:10 AM      No results found for: \"APOB\"   Lab Results   Component Value Date/Time    CRPHIGHSEN 1.3 02/28/2025 09:15 AM    CRPHIGHSEN 1.6 " 2024 07:41 AM         Lab Results   Component Value Date/Time    SODIUM 139 2025 09:10 AM    POTASSIUM 4.2 2025 09:10 AM    CHLORIDE 103 2025 09:10 AM    CO2 25 2025 09:10 AM    GLUCOSE 86 2025 09:10 AM    BUN 24 (H) 2025 09:10 AM    CREATININE 0.96 2025 09:10 AM    BUNCREATRAT 24 (H) 2023 05:59 AM     Lab Results   Component Value Date/Time    ALKPHOSPHAT 50 2025 09:10 AM    ASTSGOT 28 2025 09:10 AM    ALTSGPT 24 2025 09:10 AM    TBILIRUBIN 0.6 2025 09:10 AM       Lab Results   Component Value Date/Time    HBA1C 5.6 2025 09:15 AM    HBA1C 5.5 2024 07:41 AM    HBA1C 5.3 2024 07:41 AM       Lab Results   Component Value Date/Time    MALBCRT see below 2025 09:10 AM    MICROALBUR <1.2 2025 09:10 AM         IMAGIN/2020 CT chest with   Unremarkable CT scan of the chest.    2021 CTA neck/LUE  1.  Dissection in the left vertebral artery extending for approximately 1.4 cm.   2.  No findings to suggest arteriaLeft vertebral artery dominant. There is focal dissection in the left vertebral artery at the C3-4 level. Dissection extends superior to inferior for approximately 1.4 cm or venous thoracic outlet obstruction with arm abduction.    3/2022 CTPA   Aorta: Evaluation limited by suboptimal contrast opacification. Ascending aorta measures 3.3 cm in diameter.   Heart: The heart is not enlarged. No pericardial effusion is seen  1.  No central or segmental pulmonary embolus is identified.  2.  No acute cardiopulmonary process is seen.    2022 WBI    Wrist-brachial indices are normal. Additional testing was not preformed in    accordance with the upper extremity arterial protocol.     Carotid   1.  There is a mild amount of atherosclerotic plaque.  Plaque is located in carotid bulbs and proximal internal carotid arteries.  Plaque characterization:  Smooth and lamina   2. There is no hemodynamically  significant stenosis. Diameter reduction in the internal carotid arteries: less than 50%. There is no evidence of carotid occlusion.   3. Redemonstration of left vertebral artery dissection. Vertebral arteries demonstrate antegrade flow.    1/2024 BLE venous   No bilateral lower extremity deep venous thrombosis.    6/2024 CT a/p with   The adrenals are normal.   The kidneys show uniform nephrograms. There is no hydronephrosis. There are no renal or ureteral calcifications.   Abdominal aorta shows normal caliber throughout its course. There is no para-aortic adenopathy.    1/2024 RODRIGUEZ   Normal ankle brachial indices.    3/2025 CTA neck    The left vertebral artery origin is widely patent. The cervical segment of the left vertebral artery is patent. Again noted is a short segment of the left vertebral artery at the C3-C4 level demonstrating a double barreled appearance. This may represent   a chronic dissection or fenestration of the vessel and remains unchanged from the previous study. Distal to this, the vessel is widely patent. The suboccipital segment of the left vertebral artery is normal.   Stable appearance of a short segment dissection of the left vertebral artery at C3-C4 without flow limitation.   No evidence of carotid stenosis.        PROCEDURES: none          Medical Decision Making:  Today's Assessment / Status / Plan:     1. Dissection of extracranial vertebral artery (HCC)      LEFT      2. Anti-TPO antibodies present        3. Mild atherosclerosis of both carotid arteries        4. Hypertension, unspecified type  spironolactone/hctz (ALDACTAZIDE) 25-25 MG Tab    Basic Metabolic Panel      5. Dyslipidemia        6. Prediabetes           Established CVD:      1) Left extra-cranial vertebral artery dissection, chronic - stable, no sx   - as reviewed this would need lead to current multiplicity of diffuse symptoms and less likely to lead to LUE numbness  - has no classic posterior circ sx   - no known  "medium vessel vasculitis or rheum d/o    Surveillance  6/2021 - initially noted 1.4cm at C3-C4 level   2023 Carotid duplex = stable, no changes   3/2025 CTA = stable short seg, no changes   Plan:  - update additional vasculitis, connective d/o, inflam labs   - update CTA neck for interval surveillance      2) CHRONIC VENOUS DISEASE / INSUFFICIENCY   LLE CEAP classification: per [facility]   Plan:  - ongoing care with [facility]    - start/continue knee-high compression socks, 20-30mmHg, as much as tolerated, reviewed application, use of donning aide, resources for purchasing   - increase walking, avoid prolonged standing/sitting  - active calf pumping exercises if prolonged standing and elevate legs above heart level while sitting and sleeping   - reduce sodium (\"salt\") in diet to less than 2,000mg daily   - continue daily moisturizing lotion (such as [employer] Diabetic Foot Cream)  Medications:    - in general, diuretics will not help with CVI-assoc edema and should be avoided   - preferred evidence-based oral agents per 2023 SVS guidelines include MPFF (Rx Vasculera or OTC diosmin/hespiridin), Ruscus extract ('s broom extract)  - other available agents include horse chestnut seed extract   - start  OTC MPFF (brand with evidence-based dosing regimen is [facility] Support) for 6 mo trial      BLOOD PRESSURE CONTROL   - higher risk for 2ry HTN due to age and stage 2 HTN   Office BP goal per ACC/AHA <130/80  Home BP at goal:  no  Office BP at goal:  no with alerting response and IDH   24h ABPM:  not ordered to date   RDN candidate? YES - review at future visit   Contributing factors: none   Plan:   - start/continue home BP monitoring, reviewed correct technique, provide BP log and instructions  - order 24h ABPM:  NO  - routine monitoring of lytes/gfr   Medications:  - stop hcrtz, start spirono/hctz 25/25mg qAM, update BMP in 3 weeks     LIPID MANAGEMENT  Qualifies for Statin Therapy per ACC/AHA " "Guidelines: no, does not qualify  The ASCVD Risk score (Sayra HOWARD, et al., 2019) failed to calculate., (6/2025 = 1.9%) <5% \"low risk\"  Major ASCVD events: None    High-risk conditions: N/A  Risk-enhancers: None  Lipoprotein(a):  <6 mg/dl  Currently on Statin: No  Tx goals: LDL-C <100 , though reasonable is <130   At goal? No, 2/2025   Plan:   - monitor annual lipids   - low threshold to start at least moderate-intensity statin  if risk score  >7.5%  or >5% with risk-enhancers  - continue berberine 500mg BID     GLYCEMIC MANAGEMENT Normal    ANTITHROMBOTIC THERAPY: Yes , continue plavix 75mg dailyi   - stopped ASA due to GERD, currently on PPI   - ok to hold plavix for 5d perioperative for future surgery     LIFESTYLE INTERVENTIONS  TOBACCO:    reports that she has never smoked. She has never used smokeless tobacco.   - continued complete avoidance of all tobacco products   PHYSICAL ACTIVITY: >150min/week of mod-intensity activity or as much as tolerated  NUTRITION: Mediterranean, Plant-based - increase nuts, beans, whole grains, substitute plant protein for animal 1-2 times/week, and reduce Na to <1,500mg/day   ETOH: to limit to occasional social use  WT MGMT:  pending start of semaglutide vs tirzepatide     OTHER:     # facial blushing, swelling - unclear etiology, low prob vascular mediated   - update labs    # R foot growth - pending surgery     STUDIES:  3/2026 - carotid duplex - RN to track/order   FOLLOW-UP: 9 months     Devin Washington M.D.   [facility]  [facility]  [phone]     "

## 2025-06-04 NOTE — TELEPHONE ENCOUNTER
Caller: Scarlett MANDEL  Ph: [phone]  Fax: [phone]    Topic/issue: Calling to check status of clearance report they faxed on 5/29/25.     Patient needing procedure: RT Foot surgery    Unable to schedule until they receive fax back.    Callback Number: [phone]    Thank you,  Francesca ROJAS

## 2025-06-04 NOTE — TELEPHONE ENCOUNTER
Called and informed the forms were faxed over before 7am this morning. Scarlett verbalized understanding.

## 2025-06-18 DIAGNOSIS — G43.001 MIGRAINE WITHOUT AURA AND WITH STATUS MIGRAINOSUS, NOT INTRACTABLE: ICD-10-CM

## 2025-06-18 RX ORDER — RIZATRIPTAN BENZOATE 10 MG/1
TABLET ORAL
Qty: 12 TABLET | Refills: 0 | Status: SHIPPED | OUTPATIENT
Start: 2025-06-18

## 2025-06-18 NOTE — TELEPHONE ENCOUNTER
Received request via: Pharmacy    Was the patient seen in the last year in this department? Yes    Does the patient have an active prescription (recently filled or refills available) for medication(s) requested? No    Pharmacy Name: [id number] - [place], [place] - [address]     Does the patient have USP Plus and need 100-day supply? (This applies to ALL medications) Patient does not have SCP

## 2025-06-19 ENCOUNTER — APPOINTMENT (OUTPATIENT)
Dept: MEDICAL GROUP | Age: 55
End: 2025-06-19
Payer: COMMERCIAL

## 2025-06-25 ENCOUNTER — APPOINTMENT (OUTPATIENT)
Dept: MEDICAL GROUP | Age: 55
End: 2025-06-25
Payer: COMMERCIAL

## 2025-07-01 ENCOUNTER — APPOINTMENT (OUTPATIENT)
Dept: MEDICAL GROUP | Age: 55
End: 2025-07-01
Payer: COMMERCIAL

## 2025-07-01 ENCOUNTER — HOSPITAL ENCOUNTER (OUTPATIENT)
Dept: LAB | Facility: MEDICAL CENTER | Age: 55
End: 2025-07-01
Attending: FAMILY MEDICINE
Payer: COMMERCIAL

## 2025-07-01 VITALS
BODY MASS INDEX: 24.36 KG/M2 | HEART RATE: 125 BPM | WEIGHT: 151.6 LBS | HEIGHT: 66 IN | OXYGEN SATURATION: 97 % | DIASTOLIC BLOOD PRESSURE: 60 MMHG | SYSTOLIC BLOOD PRESSURE: 124 MMHG | TEMPERATURE: 98.3 F

## 2025-07-01 DIAGNOSIS — B99.9 RECURRENT INFECTIONS: ICD-10-CM

## 2025-07-01 DIAGNOSIS — U07.1 COVID-19: ICD-10-CM

## 2025-07-01 DIAGNOSIS — J32.9 CHRONIC RECURRENT SINUSITIS: Primary | ICD-10-CM

## 2025-07-01 DIAGNOSIS — I10 HYPERTENSION, UNSPECIFIED TYPE: ICD-10-CM

## 2025-07-01 DIAGNOSIS — J45.30 MILD PERSISTENT ASTHMA WITHOUT COMPLICATION: ICD-10-CM

## 2025-07-01 LAB
ANION GAP SERPL CALC-SCNC: 14 MMOL/L (ref 7–16)
BUN SERPL-MCNC: 24 MG/DL (ref 8–22)
CALCIUM SERPL-MCNC: 9.6 MG/DL (ref 8.5–10.5)
CHLORIDE SERPL-SCNC: 105 MMOL/L (ref 96–112)
CO2 SERPL-SCNC: 21 MMOL/L (ref 20–33)
CREAT SERPL-MCNC: 1.08 MG/DL (ref 0.5–1.4)
GFR SERPLBLD CREATININE-BSD FMLA CKD-EPI: 62 ML/MIN/1.73 M 2
GLUCOSE SERPL-MCNC: 99 MG/DL (ref 65–99)
POTASSIUM SERPL-SCNC: 3.6 MMOL/L (ref 3.6–5.5)
SODIUM SERPL-SCNC: 140 MMOL/L (ref 135–145)

## 2025-07-01 PROCEDURE — 36415 COLL VENOUS BLD VENIPUNCTURE: CPT

## 2025-07-01 PROCEDURE — 3078F DIAST BP <80 MM HG: CPT | Performed by: INTERNAL MEDICINE

## 2025-07-01 PROCEDURE — 3074F SYST BP LT 130 MM HG: CPT | Performed by: INTERNAL MEDICINE

## 2025-07-01 PROCEDURE — 99214 OFFICE O/P EST MOD 30 MIN: CPT | Performed by: INTERNAL MEDICINE

## 2025-07-01 PROCEDURE — 80048 BASIC METABOLIC PNL TOTAL CA: CPT

## 2025-07-01 RX ORDER — CEFDINIR 300 MG/1
300 CAPSULE ORAL 2 TIMES DAILY
Qty: 28 CAPSULE | Refills: 1 | Status: SHIPPED | OUTPATIENT
Start: 2025-07-01 | End: 2025-07-15

## 2025-07-01 ASSESSMENT — FIBROSIS 4 INDEX: FIB4 SCORE: 1.07

## 2025-07-01 NOTE — PROGRESS NOTES
Subjective     Odalys Ballard is a 54 y.o. female who presents with Follow-Up (Wants to talk to you about her medication she is going on a cruise )    History of Present Illness  The patient is a 54-year-old female who presents for evaluation of osteopenia, acid reflux, sinus infection, COVID-19 prevention, and arthritis.    Osteopenia  She has been diagnosed with osteopenia, which is believed to have been exacerbated by her use of pantoprazole for acid reflux. She is currently taking a supplement containing calcium, vitamin D, and magnesium. A repeat DEXA scan is scheduled for 2027. She was previously considered for Boniva treatment but was advised to continue with the supplement instead.  - Onset: Diagnosed with osteopenia.  - Alleviating/Aggravating Factors: Exacerbated by pantoprazole use; taking calcium, vitamin D, and magnesium supplement.  - Timing: Repeat DEXA scan scheduled for 2027.    Acid Reflux  She has been attempting to reduce her PPI dosage and plans to switch to Pepcid. She is currently on pantoprazole for acid reflux.  - Alleviating/Aggravating Factors: Attempting to reduce PPI dosage; plans to switch to Pepcid.  - Timing: Currently on pantoprazole.    Sinus Infection  She is planning a trip and would like to have cefdinir on hand for potential sinus infections. She has low IgG levels and a history of allergies.  - Onset: Planning a trip.  - Alleviating/Aggravating Factors: Low IgG levels; history of allergies.    COVID-19 Prevention  She has contracted COVID-19 during previous trips and is seeking Paxlovid as a precautionary measure. She also plans to bring a COVID-19 test kit with her. Her last COVID-19 vaccine was administered in 09/2024. She received the RSV vaccine two years ago and is curious about its duration of effectiveness. She has a prescription for the Haemophilus influenzae B vaccine from her allergist, who believes it could help with her low IgG levels and recurrent sinus infections.  "She is unsure where to obtain this vaccine.  - Onset: Contracted COVID-19 during previous trips.  - Alleviating/Aggravating Factors: Seeking Paxlovid; plans to bring a COVID-19 test kit.  - Timing: Last COVID-19 vaccine in 09/2024; RSV vaccine two years ago.    Arthritis  She is experiencing arthritis, which causes a hump to form when she vacuums or rows. This hump is painful and difficult to straighten. She is seeking advice on how to manage this condition.  - Onset: When vacuuming or rowing.  - Location: Hump formation.  - Character: Painful and difficult to straighten.    Stress Fracture  She has a stress fracture in her second metatarsal and has a recheck scheduled for tomorrow. She had a similar issue last year while hiking.  - Onset: Stress fracture in second metatarsal.  - Timing: Recheck scheduled for tomorrow.  - Character: Similar issue last year while hiking.    Medications Prior to Visit[1]  No visits with results within 1 Month(s) from this visit.   Latest known visit with results is:   Hospital Outpatient Visit on 03/04/2025   Component Date Value    Calprotectin, Fecal 03/04/2025 79 (H)       Lab Results   Component Value Date/Time    HBA1C 5.6 02/28/2025 09:15 AM    HBA1C 5.5 05/16/2024 07:41 AM    HBA1C 5.3 05/16/2024 07:41 AM     Lab Results   Component Value Date/Time    SODIUM 139 02/28/2025 09:10 AM    POTASSIUM 4.2 02/28/2025 09:10 AM    CHLORIDE 103 02/28/2025 09:10 AM    CO2 25 02/28/2025 09:10 AM    GLUCOSE 86 02/28/2025 09:10 AM    BUN 24 (H) 02/28/2025 09:10 AM    CREATININE 0.96 02/28/2025 09:10 AM    BUNCREATRAT 24 (H) 05/17/2023 05:59 AM    ALKPHOSPHAT 50 02/28/2025 09:10 AM    ASTSGOT 28 02/28/2025 09:10 AM    ALTSGPT 24 02/28/2025 09:10 AM    TBILIRUBIN 0.6 02/28/2025 09:10 AM     No results found for: \"INR\"  Lab Results   Component Value Date/Time    CHOLSTRLTOT 230 (H) 02/28/2025 09:10 AM     (H) 02/28/2025 09:10 AM    HDL 76 02/28/2025 09:10 AM    TRIGLYCERIDE 47 02/28/2025 " "09:10 AM       Lab Results   Component Value Date/Time    TESTOSTERONE 21 02/28/2025 09:15 AM     Lab Results   Component Value Date/Time    TSH 1.740 05/17/2023 05:59 AM    TSH 1.490 06/19/2020 06:34 AM     Lab Results   Component Value Date/Time    FREET4 1.35 02/28/2025 09:15 AM    FREET4 1.14 12/27/2023 08:48 AM     Lab Results   Component Value Date/Time    URICACID 4.2 12/14/2023 08:30 AM     No components found for: \"VITB12\"  Lab Results   Component Value Date/Time    25HYDROXY 26 (L) 02/28/2025 09:15 AM    25HYDROXY 34 05/16/2024 07:41 AM    25HYDROXY 35 05/16/2024 07:41 AM     Patient Active Problem List    Diagnosis Date Noted    Chronic left hip pain 12/14/2023    Chronic left-sided low back pain with left-sided sciatica 02/09/2023    Dissection of vertebral artery (HCC)- left on CTA 6/2021- Dr. Saldana 03/03/2022    Essential hypertension 10/01/2020    Dyslipidemia 10/01/2020    Mild persistent asthma without complication 06/15/2020    Anti-TPO antibodies present 06/04/2025    Mild atherosclerosis of both carotid arteries 02/26/2025    Foot pain, right 01/13/2025    Ganglion cyst of foot-right dorsal 08/07/2024    Stress fracture of right toe 07/03/2024    Pharyngeal dysphagia 07/03/2024    Recurrent infections 10/15/2023    Eosinophilic esophagitis 05/18/2023    S/P bilateral mastectomy 05/04/2021    Cervical radiculopathy at C6 10/01/2020    IFG (impaired fasting glucose) 10/01/2020    Vitamin D deficiency 10/01/2020    Primary insomnia 10/01/2020    Family history of breast cancer gene mutation in first degree relative 06/15/2020    Migraine without aura and with status migrainosus, not intractable 06/15/2020                 HPI    ROS           Objective     /60 (BP Location: Left arm, Patient Position: Sitting, BP Cuff Size: Adult)   Pulse (!) 125   Temp 36.8 °C (98.3 °F) (Temporal)   Ht 1.676 m (5' 6\")   Wt 68.8 kg (151 lb 9.6 oz)   LMP 10/08/2016   SpO2 97%   BMI 24.47 kg/m²  "     Physical Exam  Vitals and nursing note reviewed.   Constitutional:       General: She is not in acute distress.     Appearance: She is well-developed. She is not diaphoretic.   HENT:      Head: Normocephalic and atraumatic.      Right Ear: External ear normal.      Left Ear: External ear normal.      Nose: Nose normal.      Mouth/Throat:      Pharynx: No oropharyngeal exudate.   Eyes:      General: No scleral icterus.        Right eye: No discharge.         Left eye: No discharge.      Conjunctiva/sclera: Conjunctivae normal.      Pupils: Pupils are equal, round, and reactive to light.   Neck:      Thyroid: No thyromegaly.      Vascular: No JVD.      Trachea: No tracheal deviation.   Cardiovascular:      Rate and Rhythm: Normal rate and regular rhythm.      Heart sounds: Normal heart sounds. No murmur heard.     No friction rub. No gallop.   Pulmonary:      Effort: Pulmonary effort is normal. No respiratory distress.      Breath sounds: Normal breath sounds. No stridor. No wheezing or rales.   Chest:      Chest wall: No tenderness.   Abdominal:      General: Bowel sounds are normal. There is no distension.      Palpations: Abdomen is soft. There is no mass.      Tenderness: There is no abdominal tenderness. There is no guarding or rebound.      Hernia: No hernia is present.   Musculoskeletal:         General: No tenderness. Normal range of motion.      Cervical back: Normal range of motion and neck supple.   Lymphadenopathy:      Cervical: No cervical adenopathy.   Skin:     General: Skin is warm and dry.      Coloration: Skin is not pale.      Findings: No erythema or rash.   Neurological:      Mental Status: She is alert and oriented to person, place, and time.      Cranial Nerves: No cranial nerve deficit.      Motor: No abnormal muscle tone.      Coordination: Coordination normal.      Deep Tendon Reflexes: Reflexes are normal and symmetric. Reflexes normal.   Psychiatric:         Behavior: Behavior normal.          Thought Content: Thought content normal.         Judgment: Judgment normal.                             Assessment & Plan  1. Osteopenia.  - Diagnosed with osteopenia, likely exacerbated by long-term use of pantoprazole.  - Currently taking a supplement with calcium, vitamin D, and magnesium. Repeat DEXA scan scheduled for 2027.  - Discussed transitioning from pantoprazole to famotidine to improve mineral absorption. If continuing pantoprazole, advised to take additional supplements including zinc, calcium, magnesium, B12, and iron.  - Potential benefits of Reclast or Prolia injections discussed. Referral to a specialist will be made for further evaluation.    2. Acid Reflux.  - Currently on pantoprazole.  - Discussed switching to famotidine to mitigate side effects of long-term PPI use.  - If experiencing severe reflux, advised to take famotidine daily; otherwise, use as needed and continue occasional use of pantoprazole.  - Explained the impact of pantoprazole on mineral absorption and the importance of supplements.    3. Sinus Infection.  - Requested cefdinir to carry during upcoming trip for potential sinus infections.  - Prescription for cefdinir provided.  - Discussed her history of low IgG and susceptibility to sinus infections.    4. COVID-19 Prevention.  - Requested Paxlovid to carry during upcoming trip in case of maeve COVID-19.  - Prescription for Paxlovid provided.  - Advised to get a COVID-19 vaccine before the trip, as it has been over six months since her last dose.  - Discussed the logistics of obtaining the vaccine and the importance of carrying a COVID-19 test.    5. Arthritis.  - Complained of pain and swelling in the hand, likely due to osteoarthritis.  - Advised to apply Voltaren gel 3 to 4 times daily to alleviate inflammation and pain.  - Discussed the possibility of cortisone injections if the pain persists.  - Recommended reducing activities that exacerbate the condition, such  as vacuuming and rowing.    1. Chronic recurrent sinusitis (Primary)     - cefdinir (OMNICEF) 300 MG Cap; Take 1 Capsule by mouth 2 times a day for 14 days.  Dispense: 28 Capsule; Refill: 1    2. COVID-19     - Nirmatrelvir&Ritonavir 300/100 20 x 150 MG & 10 x 100MG Tablet Therapy Pack; Take 300 mg nirmatrelvir (two 150 mg tablets) with 100 mg ritonavir (one 100 mg tablet) by mouth, with all three tablets taken together twice daily for 5 days.  Dispense: 30 Each; Refill: 0    3. Recurrent infections     - Moderna SARS-CoV-2 Vaccine 12+    4. Mild persistent asthma without complication          - Moderna SARS-CoV-2 Vaccine 12+         Assessment & Plan  Chronic recurrent sinusitis    Orders:    cefdinir (OMNICEF) 300 MG Cap; Take 1 Capsule by mouth 2 times a day for 14 days.    COVID-19    Orders:    Nirmatrelvir&Ritonavir 300/100 20 x 150 MG & 10 x 100MG Tablet Therapy Pack; Take 300 mg nirmatrelvir (two 150 mg tablets) with 100 mg ritonavir (one 100 mg tablet) by mouth, with all three tablets taken together twice daily for 5 days.    Recurrent infections    Orders:    Moderna SARS-CoV-2 Vaccine 12+    Mild persistent asthma without complication    Orders:    Moderna SARS-CoV-2 Vaccine 12+                      [1]   Outpatient Medications Prior to Visit   Medication Sig Dispense Refill    rizatriptan (MAXALT) 10 MG tablet TAKE 1 TABLET BY MOUTH ONCE DAILY AS NEEDED FOR MIGRAINE HEADACHE 12 Tablet 0    spironolactone/hctz (ALDACTAZIDE) 25-25 MG Tab Take 1 Tablet by mouth every morning. To lower blood pressure 100 Tablet 3    Eszopiclone 3 MG Tab Take 1 Tablet by mouth at bedtime as needed (sleep) for up to 90 days. 90 Tablet 1    gabapentin (NEURONTIN) 100 MG Cap Take 1 Capsule by mouth 3 times a day. 300 Capsule 3    baclofen (LIORESAL) 10 MG Tab Take 1 tablet by mouth three times daily as needed for muscle spasm 100 Tablet 2    budesonide-formoterol (SYMBICORT) 80-4.5 MCG/ACT Aerosol Inhale 2 Puffs 2 times a day. 6  Each 3    albuterol 108 (90 Base) MCG/ACT Aero Soln inhalation aerosol Inhale 2 Puffs every 6 hours as needed for Shortness of Breath. 8.5 g 11    clopidogrel (PLAVIX) 75 MG Tab Take 1 Tablet by mouth every day. Blood thinner 100 Tablet 3    Olopatadine HCl 0.6 % Solution SPRAY 1 TO 2 SPRAY(S) IN EACH NOSTRIL TWICE DAILY      estradiol (ESTRACE) 0.1 MG/GM vaginal cream INSERT 1/4 GRAM OF CREAM VAGINALLY ONCE DAILY      pantoprazole (PROTONIX) 40 MG Tablet Delayed Response Take 40 mg by mouth every day.      Estradiol 1 MG/GM Gel Place  on the skin.      fexofenadine (ALLEGRA ALLERGY) 180 MG tablet       triamcinolone (NASACORT ALLERGY 24HR) 55 MCG/ACT nasal inhaler       azithromycin (ZITHROMAX) 250 MG Tab Take 2 tabs today then 1 per day for 4 days 6 Tablet 1    Dextromethorphan-guaiFENesin (TUSSIN DM)  MG/5ML Syrup Take 5 mL by mouth every 6 hours as needed (cough). 840 mL 2    guanFACINE (TENEX) 1 MG Tab Take 1 Tablet by mouth every day. 90 Tablet 3    rosuvastatin (CRESTOR) 5 MG Tab Take 1 Tablet by mouth every evening. 100 Tablet 3    PROTONIX 40 MG Tablet Delayed Response        No facility-administered medications prior to visit.

## 2025-07-02 ENCOUNTER — RESULTS FOLLOW-UP (OUTPATIENT)
Dept: VASCULAR LAB | Facility: MEDICAL CENTER | Age: 55
End: 2025-07-02
Payer: COMMERCIAL

## 2025-07-21 DIAGNOSIS — G43.001 MIGRAINE WITHOUT AURA AND WITH STATUS MIGRAINOSUS, NOT INTRACTABLE: ICD-10-CM

## 2025-07-22 RX ORDER — RIZATRIPTAN BENZOATE 10 MG/1
TABLET ORAL
Qty: 12 TABLET | Refills: 0 | Status: SHIPPED | OUTPATIENT
Start: 2025-07-22

## 2025-07-30 DIAGNOSIS — M62.830 MUSCLE SPASM OF BACK: ICD-10-CM

## 2025-07-30 RX ORDER — BACLOFEN 10 MG/1
10 TABLET ORAL 3 TIMES DAILY PRN
Qty: 100 TABLET | Refills: 0 | Status: SHIPPED | OUTPATIENT
Start: 2025-07-30

## 2025-08-20 DIAGNOSIS — G43.001 MIGRAINE WITHOUT AURA AND WITH STATUS MIGRAINOSUS, NOT INTRACTABLE: ICD-10-CM

## 2025-08-21 RX ORDER — RIZATRIPTAN BENZOATE 10 MG/1
TABLET ORAL
Qty: 12 TABLET | Refills: 0 | Status: SHIPPED | OUTPATIENT
Start: 2025-08-21

## 2025-08-28 DIAGNOSIS — M62.830 MUSCLE SPASM OF BACK: ICD-10-CM

## 2025-08-28 RX ORDER — BACLOFEN 10 MG/1
10 TABLET ORAL 3 TIMES DAILY PRN
Qty: 100 TABLET | Refills: 0 | Status: SHIPPED | OUTPATIENT
Start: 2025-08-28

## 2025-09-02 ENCOUNTER — APPOINTMENT (OUTPATIENT)
Dept: LAB | Facility: MEDICAL CENTER | Age: 55
End: 2025-09-02
Payer: COMMERCIAL

## 2025-12-17 ENCOUNTER — APPOINTMENT (OUTPATIENT)
Dept: MEDICAL GROUP | Age: 55
End: 2025-12-17
Payer: COMMERCIAL

## (undated) DEVICE — SUCTION INSTRUMENT YANKAUER BULBOUS TIP W/O VENT (50EA/CA)

## (undated) DEVICE — GLOVE SZ 6.5 BIOGEL PI MICRO - PF LF (50PR/BX)

## (undated) DEVICE — CANISTER SUCTION RIGID RED 1500CC (40EA/CA)

## (undated) DEVICE — SOD. CHL. INJ. 0.9% 1000 ML - (14EA/CA 60CA/PF)

## (undated) DEVICE — DRESSING TRANSPARENT FILM TEGADERM 2.375 X 2.75"  (100EA/BX)"

## (undated) DEVICE — DRAIN J-VAC 10MM FLAT - (10/CA)

## (undated) DEVICE — SLEEVE VASO CALF MED - (10PR/CA)

## (undated) DEVICE — PAD LAP STERILE 18 X 18 - (5/PK 40PK/CA)

## (undated) DEVICE — GLOVE BIOGEL PI INDICATOR SZ 7.5 SURGICAL PF LF -(50/BX 4BX/CA)

## (undated) DEVICE — TUBING CLEARLINK DUO-VENT - C-FLO (48EA/CA)

## (undated) DEVICE — CHLORAPREP 26 ML APPLICATOR - ORANGE TINT(25/CA)

## (undated) DEVICE — SLEEVE, VASO, THIGH, MED

## (undated) DEVICE — BANDAGE ELASTIC STERILE MATRIX 6 X 10 (20EA/CA)

## (undated) DEVICE — CLIP SM INTNL HRZN TI ESCP LGT - (24EA/PK 25PK/BX)

## (undated) DEVICE — SUTURE 3-0 MONOCRYL PLUS PS-2 - (12/BX)

## (undated) DEVICE — SUTURE 4-0 MONOCRYL PLUS PS-2 - 27 INCH (36/BX)

## (undated) DEVICE — GLOVE BIOGEL PI INDICATOR SZ 6.5 SURGICAL PF LF - (50/BX 4BX/CA)

## (undated) DEVICE — NEEDLE NON SAFETY 25 GA X 1 1/2 IN HYPO (100EA/BX)

## (undated) DEVICE — GLOVE BIOGEL SZ 8 SURGICAL PF LTX - (50PR/BX 4BX/CA)

## (undated) DEVICE — SUTURE 3-0 ETHILON FS-1 - (36/BX) 30 INCH

## (undated) DEVICE — SPANDAGE CHEST SZ10 25YDS - STRETCH (21 EA/CA 1RL/CA)

## (undated) DEVICE — CATHETER IV 20 GA X 1-1/4 ---SURG.& SDS ONLY--- (50EA/BX)

## (undated) DEVICE — CLOSURE SKIN STRIP 1/2 X 4 IN - (STERI STRIP) (50/BX 4BX/CA)

## (undated) DEVICE — GLOVE BIOGEL INDICATOR SZ 8.5 SURGICAL PF LTX - (50/BX 4BX/CA)

## (undated) DEVICE — ELECTRODE 850 FOAM ADHESIVE - HYDROGEL RADIOTRNSPRNT (50/PK)

## (undated) DEVICE — TUBING TUMESENCE - 10/BX

## (undated) DEVICE — SYRINGE DISP. 60 CC LL - (30/BX, 12BX/CA)**WHEN THESE ARE GONE ORDER #500206**

## (undated) DEVICE — SPONGE XRAY 8X4 STERL. 12PL - (10EA/TY 80TY/CA)

## (undated) DEVICE — PLUMEPEN ULTRA 3/8 IN X 10 FT HOSE (20EA/CA)

## (undated) DEVICE — SUTURE 3-0 VICRYL PLUS SH - 8X 18 INCH (12/BX)

## (undated) DEVICE — PROTECTOR ULNA NERVE - (36PR/CA)

## (undated) DEVICE — SUTURE 2-0 VICRYL PLUS SH - 8 X 18 INCH (12/BX)

## (undated) DEVICE — WATER IRRIGATION STERILE 1000ML (12EA/CA)

## (undated) DEVICE — TUBING PAL ASPIRATION LIPOSUC 12FT (5EA/PK)

## (undated) DEVICE — LACTATED RINGERS INJ 1000 ML - (14EA/CA 60CA/PF)

## (undated) DEVICE — GLOVE SZ 7.5 BIOGEL PI MICRO - PF LF (50PR/BX)

## (undated) DEVICE — HEAD HOLDER JUNIOR/ADULT

## (undated) DEVICE — SYRINGE DISP. 50CC LS - (40/BX)

## (undated) DEVICE — SET LEADWIRE 5 LEAD BEDSIDE DISPOSABLE ECG (1SET OF 5/EA)

## (undated) DEVICE — TRAY SKIN SCRUB PVP WET (20EA/CA) PART #DYND70356 DISCONTINUED

## (undated) DEVICE — SODIUM CHL IRRIGATION 0.9% 1000ML (12EA/CA)

## (undated) DEVICE — BOVIE BLADE COATED - (50/PK)

## (undated) DEVICE — GOWN WARMING STANDARD FLEX - (30/CA)

## (undated) DEVICE — SET EXTENSION WITH 2 PORTS (48EA/CA) ***PART #2C8610 IS A SUBSTITUTE*****

## (undated) DEVICE — KIT ANESTHESIA W/CIRCUIT & 3/LT BAG W/FILTER (20EA/CA)

## (undated) DEVICE — DRESSING ANTIMICROBIAL BIOPATCH 4.0M (40EA/CA)

## (undated) DEVICE — SYRINGE ASEPTO - (50EA/CA

## (undated) DEVICE — CANISTER SUCTION 3000ML MECHANICAL FILTER AUTO SHUTOFF MEDI-VAC NONSTERILE LF DISP  (40EA/CA)

## (undated) DEVICE — TOWELS CLOTH SURGICAL - (4/PK 20PK/CA)

## (undated) DEVICE — CLIP MED INTNL HRZN TI ESCP - (25/BX)

## (undated) DEVICE — BLADE SURGICAL #15 - (50/BX 3BX/CA)

## (undated) DEVICE — GLOVE BIOGEL SZ 6.5 SURGICAL PF LTX (50PR/BX 4BX/CA)

## (undated) DEVICE — DRESSING ABDOMINAL PAD STERILE 8 X 10" (360EA/CA)"

## (undated) DEVICE — TUBE CONNECTING SUCTION - CLEAR PLASTIC STERILE 72 IN (50EA/CA)

## (undated) DEVICE — ELECTRODE DUAL RETURN W/ CORD - (50/PK)

## (undated) DEVICE — SENSOR SPO2 NEO LNCS ADHESIVE (20/BX) SEE USER NOTES

## (undated) DEVICE — PEN SKIN MARKER W/RULER - (50EA/BX)

## (undated) DEVICE — TUBE CONNECT SUCTION CLEAR 120 X 1/4" (50EA/CA)"

## (undated) DEVICE — CONTAINER SPECIMEN BAG OR - STERILE 4 OZ W/LID (100EA/CA)

## (undated) DEVICE — KIT  I.V. START (100EA/CA)

## (undated) DEVICE — SPONGE GAUZESTER. 2X2 4-PL - (2/PK 50PK/BX 30BX/CS)

## (undated) DEVICE — DRAPE IOBAN II INCISE 23X17 - (10EA/BX 4BX/CA)

## (undated) DEVICE — NEPTUNE 4 PORT MANIFOLD - (20/PK)

## (undated) DEVICE — SUTURE GENERAL

## (undated) DEVICE — MANIFOLD NEPTUNE 1 PORT (20/PK)

## (undated) DEVICE — GLOVE BIOGEL INDICATOR SZ 6.5 SURGICAL PF LTX - (50PR/BX 4BX/CA)

## (undated) DEVICE — STAPLER SKIN DISP - (6/BX 10BX/CA) VISISTAT